# Patient Record
Sex: MALE | Race: WHITE | Employment: OTHER | URBAN - METROPOLITAN AREA
[De-identification: names, ages, dates, MRNs, and addresses within clinical notes are randomized per-mention and may not be internally consistent; named-entity substitution may affect disease eponyms.]

---

## 2017-03-02 ENCOUNTER — COMMUNICATION - HEALTHEAST (OUTPATIENT)
Dept: FAMILY MEDICINE | Facility: CLINIC | Age: 47
End: 2017-03-02

## 2017-03-02 ENCOUNTER — AMBULATORY - HEALTHEAST (OUTPATIENT)
Dept: FAMILY MEDICINE | Facility: CLINIC | Age: 47
End: 2017-03-02

## 2017-03-23 ENCOUNTER — OFFICE VISIT - HEALTHEAST (OUTPATIENT)
Dept: FAMILY MEDICINE | Facility: CLINIC | Age: 47
End: 2017-03-23

## 2017-03-23 DIAGNOSIS — J02.0 STREP THROAT: ICD-10-CM

## 2017-03-23 DIAGNOSIS — R50.9 FEVER: ICD-10-CM

## 2017-03-23 ASSESSMENT — MIFFLIN-ST. JEOR: SCORE: 1882.97

## 2017-06-20 ENCOUNTER — RECORDS - HEALTHEAST (OUTPATIENT)
Dept: ADMINISTRATIVE | Facility: OTHER | Age: 47
End: 2017-06-20

## 2017-09-12 ENCOUNTER — OFFICE VISIT - HEALTHEAST (OUTPATIENT)
Dept: FAMILY MEDICINE | Facility: CLINIC | Age: 47
End: 2017-09-12

## 2017-09-12 DIAGNOSIS — Z00.00 ROUTINE GENERAL MEDICAL EXAMINATION AT A HEALTH CARE FACILITY: ICD-10-CM

## 2017-09-12 DIAGNOSIS — F31.9 BIPOLAR AFFECTIVE DISORDER (H): ICD-10-CM

## 2017-09-12 DIAGNOSIS — Z79.899 HIGH RISK MEDICATION USE: ICD-10-CM

## 2017-09-12 LAB
CHOLEST SERPL-MCNC: 213 MG/DL
FASTING STATUS PATIENT QL REPORTED: YES
HBA1C MFR BLD: 5.4 % (ref 3.5–6)
HDLC SERPL-MCNC: 37 MG/DL
LDLC SERPL CALC-MCNC: 147 MG/DL
TRIGL SERPL-MCNC: 147 MG/DL

## 2017-09-12 ASSESSMENT — MIFFLIN-ST. JEOR: SCORE: 1908.72

## 2017-09-24 ENCOUNTER — COMMUNICATION - HEALTHEAST (OUTPATIENT)
Dept: FAMILY MEDICINE | Facility: CLINIC | Age: 47
End: 2017-09-24

## 2017-12-28 ENCOUNTER — RECORDS - HEALTHEAST (OUTPATIENT)
Dept: ADMINISTRATIVE | Facility: OTHER | Age: 47
End: 2017-12-28

## 2017-12-29 ENCOUNTER — OFFICE VISIT - HEALTHEAST (OUTPATIENT)
Dept: FAMILY MEDICINE | Facility: CLINIC | Age: 47
End: 2017-12-29

## 2017-12-29 DIAGNOSIS — F51.01 PRIMARY INSOMNIA: ICD-10-CM

## 2017-12-29 ASSESSMENT — MIFFLIN-ST. JEOR: SCORE: 1880.24

## 2018-01-02 ENCOUNTER — COMMUNICATION - HEALTHEAST (OUTPATIENT)
Dept: FAMILY MEDICINE | Facility: CLINIC | Age: 48
End: 2018-01-02

## 2018-01-29 ENCOUNTER — COMMUNICATION - HEALTHEAST (OUTPATIENT)
Dept: FAMILY MEDICINE | Facility: CLINIC | Age: 48
End: 2018-01-29

## 2018-01-29 DIAGNOSIS — K21.9 GERD (GASTROESOPHAGEAL REFLUX DISEASE): ICD-10-CM

## 2018-08-02 ENCOUNTER — OFFICE VISIT - HEALTHEAST (OUTPATIENT)
Dept: FAMILY MEDICINE | Facility: CLINIC | Age: 48
End: 2018-08-02

## 2018-08-02 DIAGNOSIS — K14.8 TONGUE LESION: ICD-10-CM

## 2018-08-02 ASSESSMENT — MIFFLIN-ST. JEOR: SCORE: 1799.73

## 2018-10-04 ENCOUNTER — AMBULATORY - HEALTHEAST (OUTPATIENT)
Dept: LAB | Facility: CLINIC | Age: 48
End: 2018-10-04

## 2018-10-04 ENCOUNTER — OFFICE VISIT - HEALTHEAST (OUTPATIENT)
Dept: FAMILY MEDICINE | Facility: CLINIC | Age: 48
End: 2018-10-04

## 2018-10-04 DIAGNOSIS — Z79.899 HIGH RISK MEDICATION USE: ICD-10-CM

## 2018-10-04 DIAGNOSIS — F31.9 BIPOLAR I DISORDER (H): ICD-10-CM

## 2018-10-04 DIAGNOSIS — F31.9 BIPOLAR AFFECTIVE DISORDER (H): ICD-10-CM

## 2018-10-04 LAB
ANION GAP SERPL CALCULATED.3IONS-SCNC: 12 MMOL/L (ref 5–18)
BUN SERPL-MCNC: 20 MG/DL (ref 8–22)
CALCIUM SERPL-MCNC: 9.3 MG/DL (ref 8.5–10.5)
CHLORIDE BLD-SCNC: 102 MMOL/L (ref 98–107)
CHOLEST SERPL-MCNC: 176 MG/DL
CO2 SERPL-SCNC: 23 MMOL/L (ref 22–31)
CREAT SERPL-MCNC: 1 MG/DL (ref 0.7–1.3)
FASTING STATUS PATIENT QL REPORTED: YES
GFR SERPL CREATININE-BSD FRML MDRD: >60 ML/MIN/1.73M2
GLUCOSE BLD-MCNC: 100 MG/DL (ref 70–125)
HBA1C MFR BLD: 5.7 % (ref 3.5–6)
HDLC SERPL-MCNC: 44 MG/DL
LDLC SERPL CALC-MCNC: 113 MG/DL
LITHIUM SERPL-SCNC: <0.2 MMOL/L (ref 0.6–1.2)
POTASSIUM BLD-SCNC: 4 MMOL/L (ref 3.5–5)
PROLACTIN SERPL-MCNC: 16.3 NG/ML (ref 0–15)
SODIUM SERPL-SCNC: 137 MMOL/L (ref 136–145)
T4 TOTAL - HISTORICAL: 6.7 UG/DL (ref 4.5–13)
TRIGL SERPL-MCNC: 95 MG/DL
TSH SERPL DL<=0.005 MIU/L-ACNC: 1.34 UIU/ML (ref 0.3–5)

## 2018-10-04 ASSESSMENT — MIFFLIN-ST. JEOR: SCORE: 1823.54

## 2018-10-10 ENCOUNTER — COMMUNICATION - HEALTHEAST (OUTPATIENT)
Dept: FAMILY MEDICINE | Facility: CLINIC | Age: 48
End: 2018-10-10

## 2018-10-16 ENCOUNTER — COMMUNICATION - HEALTHEAST (OUTPATIENT)
Dept: FAMILY MEDICINE | Facility: CLINIC | Age: 48
End: 2018-10-16

## 2018-10-16 DIAGNOSIS — K21.9 GERD (GASTROESOPHAGEAL REFLUX DISEASE): ICD-10-CM

## 2018-12-12 ENCOUNTER — COMMUNICATION - HEALTHEAST (OUTPATIENT)
Dept: SCHEDULING | Facility: CLINIC | Age: 48
End: 2018-12-12

## 2018-12-12 ENCOUNTER — OFFICE VISIT - HEALTHEAST (OUTPATIENT)
Dept: FAMILY MEDICINE | Facility: CLINIC | Age: 48
End: 2018-12-12

## 2018-12-12 DIAGNOSIS — Z00.00 ENCOUNTER FOR ROUTINE ADULT HEALTH EXAMINATION WITHOUT ABNORMAL FINDINGS: ICD-10-CM

## 2018-12-12 DIAGNOSIS — F99 INSOMNIA DUE TO OTHER MENTAL DISORDER: ICD-10-CM

## 2018-12-12 DIAGNOSIS — F51.05 INSOMNIA DUE TO OTHER MENTAL DISORDER: ICD-10-CM

## 2018-12-12 ASSESSMENT — MIFFLIN-ST. JEOR: SCORE: 1837.15

## 2018-12-13 ENCOUNTER — COMMUNICATION - HEALTHEAST (OUTPATIENT)
Dept: FAMILY MEDICINE | Facility: CLINIC | Age: 48
End: 2018-12-13

## 2018-12-13 ENCOUNTER — OFFICE VISIT - HEALTHEAST (OUTPATIENT)
Dept: FAMILY MEDICINE | Facility: CLINIC | Age: 48
End: 2018-12-13

## 2018-12-13 DIAGNOSIS — G47.00 INSOMNIA, UNSPECIFIED TYPE: ICD-10-CM

## 2018-12-13 DIAGNOSIS — F31.9 BIPOLAR AFFECTIVE DISORDER, REMISSION STATUS UNSPECIFIED (H): ICD-10-CM

## 2018-12-13 ASSESSMENT — MIFFLIN-ST. JEOR: SCORE: 1845.94

## 2019-01-01 ENCOUNTER — COMMUNICATION - HEALTHEAST (OUTPATIENT)
Dept: SCHEDULING | Facility: CLINIC | Age: 49
End: 2019-01-01

## 2019-01-04 ENCOUNTER — OFFICE VISIT - HEALTHEAST (OUTPATIENT)
Dept: FAMILY MEDICINE | Facility: CLINIC | Age: 49
End: 2019-01-04

## 2019-01-04 ENCOUNTER — COMMUNICATION - HEALTHEAST (OUTPATIENT)
Dept: FAMILY MEDICINE | Facility: CLINIC | Age: 49
End: 2019-01-04

## 2019-01-04 DIAGNOSIS — F51.01 PRIMARY INSOMNIA: ICD-10-CM

## 2019-01-04 DIAGNOSIS — F31.9 BIPOLAR AFFECTIVE DISORDER, REMISSION STATUS UNSPECIFIED (H): ICD-10-CM

## 2019-01-04 ASSESSMENT — MIFFLIN-ST. JEOR: SCORE: 1849.63

## 2019-03-14 ENCOUNTER — RECORDS - HEALTHEAST (OUTPATIENT)
Dept: ADMINISTRATIVE | Facility: OTHER | Age: 49
End: 2019-03-14

## 2019-04-09 ENCOUNTER — OFFICE VISIT - HEALTHEAST (OUTPATIENT)
Dept: FAMILY MEDICINE | Facility: CLINIC | Age: 49
End: 2019-04-09

## 2019-04-09 DIAGNOSIS — N64.4 NIPPLE TENDERNESS: ICD-10-CM

## 2019-04-09 DIAGNOSIS — Z76.0 ENCOUNTER FOR MEDICATION REFILL: ICD-10-CM

## 2019-04-09 DIAGNOSIS — K59.01 SLOW TRANSIT CONSTIPATION: ICD-10-CM

## 2019-04-09 DIAGNOSIS — Z00.00 ROUTINE HEALTH MAINTENANCE: ICD-10-CM

## 2019-04-09 ASSESSMENT — MIFFLIN-ST. JEOR: SCORE: 1890.45

## 2019-06-04 ENCOUNTER — OFFICE VISIT - HEALTHEAST (OUTPATIENT)
Dept: FAMILY MEDICINE | Facility: CLINIC | Age: 49
End: 2019-06-04

## 2019-06-04 DIAGNOSIS — F51.5 NIGHTMARES: ICD-10-CM

## 2019-06-04 DIAGNOSIS — R05.9 COUGH: ICD-10-CM

## 2019-06-04 DIAGNOSIS — H61.23 BILATERAL IMPACTED CERUMEN: ICD-10-CM

## 2019-06-04 LAB — DEPRECATED S PYO AG THROAT QL EIA: NORMAL

## 2019-06-04 ASSESSMENT — MIFFLIN-ST. JEOR: SCORE: 1851.89

## 2019-06-05 LAB — GROUP A STREP BY PCR: NORMAL

## 2019-08-07 ENCOUNTER — COMMUNICATION - HEALTHEAST (OUTPATIENT)
Dept: FAMILY MEDICINE | Facility: CLINIC | Age: 49
End: 2019-08-07

## 2019-09-05 ENCOUNTER — COMMUNICATION - HEALTHEAST (OUTPATIENT)
Dept: FAMILY MEDICINE | Facility: CLINIC | Age: 49
End: 2019-09-05

## 2019-09-05 DIAGNOSIS — K21.9 GERD (GASTROESOPHAGEAL REFLUX DISEASE): ICD-10-CM

## 2019-11-06 ENCOUNTER — OFFICE VISIT - HEALTHEAST (OUTPATIENT)
Dept: FAMILY MEDICINE | Facility: CLINIC | Age: 49
End: 2019-11-06

## 2019-11-06 DIAGNOSIS — Z12.5 SCREENING FOR PROSTATE CANCER: ICD-10-CM

## 2019-11-06 DIAGNOSIS — Z12.11 COLON CANCER SCREENING: ICD-10-CM

## 2019-11-06 DIAGNOSIS — R33.9 INCOMPLETE BLADDER EMPTYING: ICD-10-CM

## 2019-11-06 LAB
AMORPH CRY #/AREA URNS HPF: ABNORMAL /[HPF]
BACTERIA #/AREA URNS HPF: ABNORMAL HPF
MUCOUS THREADS #/AREA URNS LPF: ABNORMAL LPF
PSA SERPL-MCNC: 1.1 NG/ML (ref 0–3.5)
RBC #/AREA URNS AUTO: ABNORMAL HPF
SQUAMOUS #/AREA URNS AUTO: ABNORMAL LPF
WBC #/AREA URNS AUTO: ABNORMAL HPF

## 2019-11-06 ASSESSMENT — MIFFLIN-ST. JEOR: SCORE: 1836.02

## 2019-11-08 ENCOUNTER — COMMUNICATION - HEALTHEAST (OUTPATIENT)
Dept: SCHEDULING | Facility: CLINIC | Age: 49
End: 2019-11-08

## 2019-11-14 ENCOUNTER — HOSPITAL ENCOUNTER (OUTPATIENT)
Dept: LAB | Age: 49
Setting detail: SPECIMEN
Discharge: HOME | End: 2019-11-14

## 2019-11-14 ENCOUNTER — OFFICE VISIT - HEALTHEAST (OUTPATIENT)
Dept: FAMILY MEDICINE | Facility: CLINIC | Age: 49
End: 2019-11-14

## 2019-11-14 DIAGNOSIS — Z00.00 ANNUAL PHYSICAL EXAM: ICD-10-CM

## 2019-11-14 LAB
CHOLEST SERPL-MCNC: 191 MG/DL
FASTING STATUS PATIENT QL REPORTED: ABNORMAL
HDLC SERPL-MCNC: 45 MG/DL
LDLC SERPL CALC-MCNC: 107 MG/DL
TRIGL SERPL-MCNC: 193 MG/DL

## 2019-11-25 ENCOUNTER — COMMUNICATION - HEALTHEAST (OUTPATIENT)
Dept: FAMILY MEDICINE | Facility: CLINIC | Age: 49
End: 2019-11-25

## 2019-11-25 ENCOUNTER — OFFICE VISIT - HEALTHEAST (OUTPATIENT)
Dept: FAMILY MEDICINE | Facility: CLINIC | Age: 49
End: 2019-11-25

## 2019-11-25 DIAGNOSIS — R31.29 MICROSCOPIC HEMATURIA: ICD-10-CM

## 2019-11-25 LAB
ALBUMIN UR-MCNC: NEGATIVE MG/DL
APPEARANCE UR: CLEAR
BACTERIA #/AREA URNS HPF: ABNORMAL HPF
BILIRUB UR QL STRIP: NEGATIVE
COLOR UR AUTO: YELLOW
GLUCOSE UR STRIP-MCNC: NEGATIVE MG/DL
HGB UR QL STRIP: NEGATIVE
KETONES UR STRIP-MCNC: NEGATIVE MG/DL
LEUKOCYTE ESTERASE UR QL STRIP: ABNORMAL
NITRATE UR QL: NEGATIVE
PH UR STRIP: 6 [PH] (ref 5–8)
RBC #/AREA URNS AUTO: ABNORMAL HPF
SP GR UR STRIP: 1.01 (ref 1–1.03)
SQUAMOUS #/AREA URNS AUTO: ABNORMAL LPF
UROBILINOGEN UR STRIP-ACNC: ABNORMAL
WBC #/AREA URNS AUTO: ABNORMAL HPF

## 2019-11-25 ASSESSMENT — MIFFLIN-ST. JEOR: SCORE: 1818.23

## 2019-11-26 LAB — BACTERIA SPEC CULT: NO GROWTH

## 2019-12-03 ENCOUNTER — OFFICE VISIT - HEALTHEAST (OUTPATIENT)
Dept: FAMILY MEDICINE | Facility: CLINIC | Age: 49
End: 2019-12-03

## 2019-12-03 DIAGNOSIS — R31.29 MICROSCOPIC HEMATURIA: ICD-10-CM

## 2019-12-03 ASSESSMENT — MIFFLIN-ST. JEOR: SCORE: 1835.8

## 2019-12-09 ENCOUNTER — COMMUNICATION - HEALTHEAST (OUTPATIENT)
Dept: NURSING | Facility: CLINIC | Age: 49
End: 2019-12-09

## 2019-12-09 ENCOUNTER — COMMUNICATION - HEALTHEAST (OUTPATIENT)
Dept: SCHEDULING | Facility: CLINIC | Age: 49
End: 2019-12-09

## 2019-12-09 ENCOUNTER — COMMUNICATION - HEALTHEAST (OUTPATIENT)
Dept: FAMILY MEDICINE | Facility: CLINIC | Age: 49
End: 2019-12-09

## 2019-12-09 ENCOUNTER — AMBULATORY - HEALTHEAST (OUTPATIENT)
Dept: CARE COORDINATION | Facility: CLINIC | Age: 49
End: 2019-12-09

## 2019-12-09 DIAGNOSIS — F10.10 ALCOHOL ABUSE: ICD-10-CM

## 2019-12-09 DIAGNOSIS — F31.9 BIPOLAR AFFECTIVE DISORDER (H): ICD-10-CM

## 2019-12-09 DIAGNOSIS — F41.9 ANXIETY: ICD-10-CM

## 2019-12-10 ENCOUNTER — OFFICE VISIT - HEALTHEAST (OUTPATIENT)
Dept: FAMILY MEDICINE | Facility: CLINIC | Age: 49
End: 2019-12-10

## 2019-12-10 DIAGNOSIS — F25.0 SCHIZOAFFECTIVE DISORDER, BIPOLAR TYPE (H): ICD-10-CM

## 2019-12-10 ASSESSMENT — MIFFLIN-ST. JEOR: SCORE: 1799.73

## 2019-12-17 ENCOUNTER — OFFICE VISIT - HEALTHEAST (OUTPATIENT)
Dept: FAMILY MEDICINE | Facility: CLINIC | Age: 49
End: 2019-12-17

## 2019-12-17 DIAGNOSIS — F25.0 SCHIZOAFFECTIVE DISORDER, BIPOLAR TYPE (H): ICD-10-CM

## 2019-12-17 ASSESSMENT — MIFFLIN-ST. JEOR: SCORE: 1822.41

## 2019-12-23 ENCOUNTER — COMMUNICATION - HEALTHEAST (OUTPATIENT)
Dept: SCHEDULING | Facility: CLINIC | Age: 49
End: 2019-12-23

## 2019-12-24 ENCOUNTER — AMBULATORY - HEALTHEAST (OUTPATIENT)
Dept: CARE COORDINATION | Facility: CLINIC | Age: 49
End: 2019-12-24

## 2019-12-24 DIAGNOSIS — F41.9 ANXIETY: ICD-10-CM

## 2019-12-24 DIAGNOSIS — F25.0 SCHIZOAFFECTIVE DISORDER, BIPOLAR TYPE (H): ICD-10-CM

## 2019-12-26 ENCOUNTER — OFFICE VISIT - HEALTHEAST (OUTPATIENT)
Dept: FAMILY MEDICINE | Facility: CLINIC | Age: 49
End: 2019-12-26

## 2019-12-26 DIAGNOSIS — F25.0 SCHIZOAFFECTIVE DISORDER, BIPOLAR TYPE (H): ICD-10-CM

## 2019-12-26 DIAGNOSIS — R06.02 SHORTNESS OF BREATH: ICD-10-CM

## 2019-12-26 ASSESSMENT — MIFFLIN-ST. JEOR: SCORE: 1795.19

## 2019-12-27 ENCOUNTER — COMMUNICATION - HEALTHEAST (OUTPATIENT)
Dept: SCHEDULING | Facility: CLINIC | Age: 49
End: 2019-12-27

## 2020-01-06 ENCOUNTER — COMMUNICATION - HEALTHEAST (OUTPATIENT)
Dept: FAMILY MEDICINE | Facility: CLINIC | Age: 50
End: 2020-01-06

## 2020-01-09 ENCOUNTER — OFFICE VISIT - HEALTHEAST (OUTPATIENT)
Dept: FAMILY MEDICINE | Facility: CLINIC | Age: 50
End: 2020-01-09

## 2020-01-09 DIAGNOSIS — F25.0 SCHIZOAFFECTIVE DISORDER, BIPOLAR TYPE (H): ICD-10-CM

## 2020-01-09 DIAGNOSIS — F12.11 MARIJUANA ABUSE IN REMISSION: ICD-10-CM

## 2020-01-09 DIAGNOSIS — R20.2 PARESTHESIAS: ICD-10-CM

## 2020-01-09 ASSESSMENT — MIFFLIN-ST. JEOR: SCORE: 1853.03

## 2020-01-22 ENCOUNTER — OFFICE VISIT - HEALTHEAST (OUTPATIENT)
Dept: FAMILY MEDICINE | Facility: CLINIC | Age: 50
End: 2020-01-22

## 2020-01-22 DIAGNOSIS — F25.0 SCHIZOAFFECTIVE DISORDER, BIPOLAR TYPE (H): ICD-10-CM

## 2020-01-22 DIAGNOSIS — Z79.899 HIGH RISK MEDICATION USE: ICD-10-CM

## 2020-01-22 DIAGNOSIS — F12.11 MARIJUANA ABUSE IN REMISSION: ICD-10-CM

## 2020-01-22 DIAGNOSIS — F31.12 BIPOLAR I DISORDER, MOST RECENT EPISODE (OR CURRENT) MANIC, MODERATE (H): ICD-10-CM

## 2020-01-22 LAB
CHOLEST SERPL-MCNC: 217 MG/DL
FASTING STATUS PATIENT QL REPORTED: NO
FASTING STATUS PATIENT QL REPORTED: NO
GLUCOSE BLD-MCNC: 87 MG/DL (ref 74–125)
HBA1C MFR BLD: 5.5 % (ref 3.5–6)
HDLC SERPL-MCNC: 43 MG/DL
LDLC SERPL CALC-MCNC: ABNORMAL MG/DL
PROLACTIN SERPL-MCNC: 66.4 NG/ML (ref 0–15)
TRIGL SERPL-MCNC: 405 MG/DL

## 2020-01-22 ASSESSMENT — MIFFLIN-ST. JEOR: SCORE: 1840.55

## 2020-01-23 ENCOUNTER — COMMUNICATION - HEALTHEAST (OUTPATIENT)
Dept: FAMILY MEDICINE | Facility: CLINIC | Age: 50
End: 2020-01-23

## 2020-02-10 ENCOUNTER — COMMUNICATION - HEALTHEAST (OUTPATIENT)
Dept: FAMILY MEDICINE | Facility: CLINIC | Age: 50
End: 2020-02-10

## 2020-02-13 ENCOUNTER — OFFICE VISIT - HEALTHEAST (OUTPATIENT)
Dept: FAMILY MEDICINE | Facility: CLINIC | Age: 50
End: 2020-02-13

## 2020-02-13 DIAGNOSIS — F25.0 SCHIZOAFFECTIVE DISORDER, BIPOLAR TYPE (H): ICD-10-CM

## 2020-02-13 ASSESSMENT — MIFFLIN-ST. JEOR: SCORE: 1872.31

## 2020-03-18 ENCOUNTER — OFFICE VISIT - HEALTHEAST (OUTPATIENT)
Dept: FAMILY MEDICINE | Facility: CLINIC | Age: 50
End: 2020-03-18

## 2020-03-18 DIAGNOSIS — F25.0 SCHIZOAFFECTIVE DISORDER, BIPOLAR TYPE (H): ICD-10-CM

## 2020-03-18 DIAGNOSIS — Z23 NEED FOR IMMUNIZATION AGAINST INFLUENZA: ICD-10-CM

## 2020-03-18 DIAGNOSIS — F31.70 BIPOLAR DISORDER IN PARTIAL REMISSION, MOST RECENT EPISODE UNSPECIFIED TYPE (H): ICD-10-CM

## 2020-03-18 DIAGNOSIS — F32.A DEPRESSION: ICD-10-CM

## 2020-03-18 ASSESSMENT — PATIENT HEALTH QUESTIONNAIRE - PHQ9: SUM OF ALL RESPONSES TO PHQ QUESTIONS 1-9: 0

## 2020-03-18 ASSESSMENT — MIFFLIN-ST. JEOR: SCORE: 1894.99

## 2020-03-23 ENCOUNTER — COMMUNICATION - HEALTHEAST (OUTPATIENT)
Dept: FAMILY MEDICINE | Facility: CLINIC | Age: 50
End: 2020-03-23

## 2020-03-25 ENCOUNTER — COMMUNICATION - HEALTHEAST (OUTPATIENT)
Dept: FAMILY MEDICINE | Facility: CLINIC | Age: 50
End: 2020-03-25

## 2020-03-25 DIAGNOSIS — K21.9 GERD (GASTROESOPHAGEAL REFLUX DISEASE): ICD-10-CM

## 2020-04-06 ENCOUNTER — COMMUNICATION - HEALTHEAST (OUTPATIENT)
Dept: FAMILY MEDICINE | Facility: CLINIC | Age: 50
End: 2020-04-06

## 2020-04-21 ENCOUNTER — OFFICE VISIT - HEALTHEAST (OUTPATIENT)
Dept: FAMILY MEDICINE | Facility: CLINIC | Age: 50
End: 2020-04-21

## 2020-04-21 DIAGNOSIS — G47.00 INSOMNIA, UNSPECIFIED TYPE: ICD-10-CM

## 2020-04-21 DIAGNOSIS — F25.0 SCHIZOAFFECTIVE DISORDER, BIPOLAR TYPE (H): ICD-10-CM

## 2020-04-21 ASSESSMENT — MIFFLIN-ST. JEOR: SCORE: 1867.77

## 2020-05-26 ENCOUNTER — OFFICE VISIT - HEALTHEAST (OUTPATIENT)
Dept: FAMILY MEDICINE | Facility: CLINIC | Age: 50
End: 2020-05-26

## 2020-05-26 DIAGNOSIS — G47.00 INSOMNIA, UNSPECIFIED TYPE: ICD-10-CM

## 2020-06-25 ENCOUNTER — AMBULATORY - HEALTHEAST (OUTPATIENT)
Dept: NURSING | Facility: CLINIC | Age: 50
End: 2020-06-25

## 2020-07-08 ENCOUNTER — RECORDS - HEALTHEAST (OUTPATIENT)
Dept: ADMINISTRATIVE | Facility: OTHER | Age: 50
End: 2020-07-08

## 2020-07-23 ENCOUNTER — COMMUNICATION - HEALTHEAST (OUTPATIENT)
Dept: SCHEDULING | Facility: CLINIC | Age: 50
End: 2020-07-23

## 2020-07-29 ENCOUNTER — AMBULATORY - HEALTHEAST (OUTPATIENT)
Dept: NURSING | Facility: CLINIC | Age: 50
End: 2020-07-29

## 2020-08-25 ENCOUNTER — COMMUNICATION - HEALTHEAST (OUTPATIENT)
Dept: FAMILY MEDICINE | Facility: CLINIC | Age: 50
End: 2020-08-25

## 2020-08-27 ENCOUNTER — COMMUNICATION - HEALTHEAST (OUTPATIENT)
Dept: FAMILY MEDICINE | Facility: CLINIC | Age: 50
End: 2020-08-27

## 2020-08-28 ENCOUNTER — AMBULATORY - HEALTHEAST (OUTPATIENT)
Dept: NURSING | Facility: CLINIC | Age: 50
End: 2020-08-28

## 2020-09-16 ENCOUNTER — RECORDS - HEALTHEAST (OUTPATIENT)
Dept: ADMINISTRATIVE | Facility: OTHER | Age: 50
End: 2020-09-16

## 2020-09-30 ENCOUNTER — AMBULATORY - HEALTHEAST (OUTPATIENT)
Dept: NURSING | Facility: CLINIC | Age: 50
End: 2020-09-30

## 2020-09-30 ENCOUNTER — AMBULATORY - HEALTHEAST (OUTPATIENT)
Dept: LAB | Facility: CLINIC | Age: 50
End: 2020-09-30

## 2020-09-30 DIAGNOSIS — Z79.899 ENCOUNTER FOR LONG-TERM (CURRENT) USE OF HIGH-RISK MEDICATION: ICD-10-CM

## 2020-09-30 DIAGNOSIS — Z23 NEED FOR IMMUNIZATION AGAINST INFLUENZA: ICD-10-CM

## 2020-09-30 LAB
CHOLEST SERPL-MCNC: 199 MG/DL
FASTING STATUS PATIENT QL REPORTED: NO
FASTING STATUS PATIENT QL REPORTED: NO
GLUCOSE BLD-MCNC: 77 MG/DL (ref 74–125)
HBA1C MFR BLD: 5.6 %
HDLC SERPL-MCNC: 43 MG/DL
LDLC SERPL CALC-MCNC: 93 MG/DL
PROLACTIN SERPL-MCNC: 45.7 NG/ML (ref 0–15)
TRIGL SERPL-MCNC: 316 MG/DL

## 2020-10-01 ENCOUNTER — COMMUNICATION - HEALTHEAST (OUTPATIENT)
Dept: FAMILY MEDICINE | Facility: CLINIC | Age: 50
End: 2020-10-01

## 2020-10-30 ENCOUNTER — AMBULATORY - HEALTHEAST (OUTPATIENT)
Dept: NURSING | Facility: CLINIC | Age: 50
End: 2020-10-30

## 2020-12-02 ENCOUNTER — AMBULATORY - HEALTHEAST (OUTPATIENT)
Dept: NURSING | Facility: CLINIC | Age: 50
End: 2020-12-02

## 2020-12-14 ENCOUNTER — OFFICE VISIT - HEALTHEAST (OUTPATIENT)
Dept: FAMILY MEDICINE | Facility: CLINIC | Age: 50
End: 2020-12-14

## 2020-12-14 DIAGNOSIS — F25.0 SCHIZOAFFECTIVE DISORDER, BIPOLAR TYPE (H): ICD-10-CM

## 2020-12-14 ASSESSMENT — MIFFLIN-ST. JEOR: SCORE: 1926.74

## 2020-12-15 ENCOUNTER — COMMUNICATION - HEALTHEAST (OUTPATIENT)
Dept: NURSING | Facility: CLINIC | Age: 50
End: 2020-12-15

## 2020-12-30 ENCOUNTER — COMMUNICATION - HEALTHEAST (OUTPATIENT)
Dept: NURSING | Facility: CLINIC | Age: 50
End: 2020-12-30

## 2021-01-04 ENCOUNTER — AMBULATORY - HEALTHEAST (OUTPATIENT)
Dept: NURSING | Facility: CLINIC | Age: 51
End: 2021-01-04

## 2021-01-04 ENCOUNTER — AMBULATORY - HEALTHEAST (OUTPATIENT)
Dept: FAMILY MEDICINE | Facility: CLINIC | Age: 51
End: 2021-01-04

## 2021-01-04 DIAGNOSIS — F25.0 SCHIZOAFFECTIVE DISORDER, BIPOLAR TYPE (H): ICD-10-CM

## 2021-01-06 ENCOUNTER — OFFICE VISIT - HEALTHEAST (OUTPATIENT)
Dept: FAMILY MEDICINE | Facility: CLINIC | Age: 51
End: 2021-01-06

## 2021-01-06 DIAGNOSIS — R07.9 CHEST PAIN, UNSPECIFIED TYPE: ICD-10-CM

## 2021-01-06 DIAGNOSIS — F25.0 SCHIZOAFFECTIVE DISORDER, BIPOLAR TYPE (H): ICD-10-CM

## 2021-01-06 ASSESSMENT — PATIENT HEALTH QUESTIONNAIRE - PHQ9: SUM OF ALL RESPONSES TO PHQ QUESTIONS 1-9: 4

## 2021-01-06 ASSESSMENT — MIFFLIN-ST. JEOR: SCORE: 1953.95

## 2021-01-07 LAB
ATRIAL RATE - MUSE: 110 BPM
DIASTOLIC BLOOD PRESSURE - MUSE: NORMAL
INTERPRETATION ECG - MUSE: NORMAL
P AXIS - MUSE: 60 DEGREES
PR INTERVAL - MUSE: 140 MS
QRS DURATION - MUSE: 92 MS
QT - MUSE: 340 MS
QTC - MUSE: 460 MS
R AXIS - MUSE: 12 DEGREES
SYSTOLIC BLOOD PRESSURE - MUSE: NORMAL
T AXIS - MUSE: 64 DEGREES
VENTRICULAR RATE- MUSE: 110 BPM

## 2021-02-09 ENCOUNTER — AMBULATORY - HEALTHEAST (OUTPATIENT)
Dept: NURSING | Facility: CLINIC | Age: 51
End: 2021-02-09

## 2021-02-11 ENCOUNTER — OFFICE VISIT - HEALTHEAST (OUTPATIENT)
Dept: FAMILY MEDICINE | Facility: CLINIC | Age: 51
End: 2021-02-11

## 2021-02-11 DIAGNOSIS — F25.0 SCHIZOAFFECTIVE DISORDER, BIPOLAR TYPE (H): ICD-10-CM

## 2021-02-11 DIAGNOSIS — R00.0 SINUS TACHYCARDIA: ICD-10-CM

## 2021-02-11 DIAGNOSIS — H93.13 TINNITUS, BILATERAL: ICD-10-CM

## 2021-02-11 RX ORDER — METOPROLOL SUCCINATE 50 MG/1
50 TABLET, EXTENDED RELEASE ORAL DAILY
Qty: 30 TABLET | Refills: 11 | Status: SHIPPED | OUTPATIENT
Start: 2021-02-11 | End: 2021-08-03

## 2021-02-11 ASSESSMENT — MIFFLIN-ST. JEOR: SCORE: 1993.64

## 2021-03-10 ENCOUNTER — RECORDS - HEALTHEAST (OUTPATIENT)
Dept: ADMINISTRATIVE | Facility: OTHER | Age: 51
End: 2021-03-10

## 2021-03-16 ENCOUNTER — OFFICE VISIT - HEALTHEAST (OUTPATIENT)
Dept: FAMILY MEDICINE | Facility: CLINIC | Age: 51
End: 2021-03-16

## 2021-03-16 DIAGNOSIS — R00.0 SINUS TACHYCARDIA: ICD-10-CM

## 2021-03-16 DIAGNOSIS — F25.0 SCHIZOAFFECTIVE DISORDER, BIPOLAR TYPE (H): ICD-10-CM

## 2021-03-16 DIAGNOSIS — Z79.899 HIGH RISK MEDICATIONS (NOT ANTICOAGULANTS) LONG-TERM USE: ICD-10-CM

## 2021-03-16 ASSESSMENT — MIFFLIN-ST. JEOR: SCORE: 2017.46

## 2021-03-30 ENCOUNTER — MEDICAL CORRESPONDENCE (OUTPATIENT)
Dept: HEALTH INFORMATION MANAGEMENT | Facility: CLINIC | Age: 51
End: 2021-03-30

## 2021-04-03 ENCOUNTER — COMMUNICATION - HEALTHEAST (OUTPATIENT)
Dept: FAMILY MEDICINE | Facility: CLINIC | Age: 51
End: 2021-04-03

## 2021-04-03 DIAGNOSIS — K21.9 GERD (GASTROESOPHAGEAL REFLUX DISEASE): ICD-10-CM

## 2021-04-05 RX ORDER — OMEPRAZOLE 40 MG/1
CAPSULE, DELAYED RELEASE ORAL
Qty: 90 CAPSULE | Refills: 3 | Status: SHIPPED | OUTPATIENT
Start: 2021-04-05 | End: 2022-03-10

## 2021-04-07 ENCOUNTER — COMMUNICATION - HEALTHEAST (OUTPATIENT)
Dept: FAMILY MEDICINE | Facility: CLINIC | Age: 51
End: 2021-04-07

## 2021-04-07 LAB
ALT SERPL W P-5'-P-CCNC: 42 U/L (ref 0–45)
AMYLASE SERPL-CCNC: 53 U/L (ref 5–120)
BASOPHILS # BLD AUTO: 0.1 THOU/UL (ref 0–0.2)
BASOPHILS NFR BLD AUTO: 1 % (ref 0–2)
CHOLEST SERPL-MCNC: 152 MG/DL
EOSINOPHIL # BLD AUTO: 0.3 THOU/UL (ref 0–0.4)
EOSINOPHIL NFR BLD AUTO: 4 % (ref 0–6)
ERYTHROCYTE [DISTWIDTH] IN BLOOD BY AUTOMATED COUNT: 13.2 % (ref 11–14.5)
FASTING STATUS PATIENT QL REPORTED: NO
FASTING STATUS PATIENT QL REPORTED: NO
GLUCOSE BLD-MCNC: 102 MG/DL (ref 74–125)
HBA1C MFR BLD: 5.5 %
HCT VFR BLD AUTO: 40.2 % (ref 40–54)
HDLC SERPL-MCNC: 39 MG/DL
HGB BLD-MCNC: 14.1 G/DL (ref 14–18)
IMM GRANULOCYTES # BLD: 0 THOU/UL
IMM GRANULOCYTES NFR BLD: 0 %
LDLC SERPL CALC-MCNC: 81 MG/DL
LYMPHOCYTES # BLD AUTO: 1.9 THOU/UL (ref 0.8–4.4)
LYMPHOCYTES NFR BLD AUTO: 24 % (ref 20–40)
MCH RBC QN AUTO: 33.1 PG (ref 27–34)
MCHC RBC AUTO-ENTMCNC: 35.1 G/DL (ref 32–36)
MCV RBC AUTO: 94 FL (ref 80–100)
MONOCYTES # BLD AUTO: 0.7 THOU/UL (ref 0–0.9)
MONOCYTES NFR BLD AUTO: 9 % (ref 2–10)
NEUTROPHILS # BLD AUTO: 4.8 THOU/UL (ref 2–7.7)
NEUTROPHILS NFR BLD AUTO: 62 % (ref 50–70)
PLATELET # BLD AUTO: 337 THOU/UL (ref 140–440)
PMV BLD AUTO: 8.9 FL (ref 7–10)
PROLACTIN SERPL-MCNC: 59.1 NG/ML (ref 0–15)
RBC # BLD AUTO: 4.26 MILL/UL (ref 4.4–6.2)
TRIGL SERPL-MCNC: 160 MG/DL
VALPROATE SERPL-MCNC: 26.5 UG/ML (ref 50–150)
WBC: 7.7 THOU/UL (ref 4–11)

## 2021-04-20 ENCOUNTER — OFFICE VISIT - HEALTHEAST (OUTPATIENT)
Dept: FAMILY MEDICINE | Facility: CLINIC | Age: 51
End: 2021-04-20

## 2021-04-20 DIAGNOSIS — F25.0 SCHIZOAFFECTIVE DISORDER, BIPOLAR TYPE (H): ICD-10-CM

## 2021-04-20 DIAGNOSIS — J30.2 SEASONAL ALLERGIC RHINITIS, UNSPECIFIED TRIGGER: ICD-10-CM

## 2021-04-20 DIAGNOSIS — R31.0 GROSS HEMATURIA: ICD-10-CM

## 2021-04-20 DIAGNOSIS — R00.0 SINUS TACHYCARDIA: ICD-10-CM

## 2021-04-20 LAB
ALBUMIN UR-MCNC: NEGATIVE G/DL
APPEARANCE UR: CLEAR
BILIRUB UR QL STRIP: NEGATIVE
COLOR UR AUTO: YELLOW
GLUCOSE UR STRIP-MCNC: NEGATIVE MG/DL
HGB UR QL STRIP: NEGATIVE
KETONES UR STRIP-MCNC: NEGATIVE MG/DL
LEUKOCYTE ESTERASE UR QL STRIP: NEGATIVE
NITRATE UR QL: NEGATIVE
PH UR STRIP: 5.5 [PH] (ref 5–8)
PSA SERPL-MCNC: 0.5 NG/ML (ref 0–3.5)
SP GR UR STRIP: 1.01 (ref 1–1.03)
UROBILINOGEN UR STRIP-ACNC: NORMAL

## 2021-04-20 RX ORDER — DIVALPROEX SODIUM 500 MG/1
500 TABLET, EXTENDED RELEASE ORAL AT BEDTIME
Status: SHIPPED | COMMUNITY
Start: 2021-04-07 | End: 2022-02-14

## 2021-04-20 ASSESSMENT — MIFFLIN-ST. JEOR: SCORE: 2008.38

## 2021-04-22 ENCOUNTER — COMMUNICATION - HEALTHEAST (OUTPATIENT)
Dept: FAMILY MEDICINE | Facility: CLINIC | Age: 51
End: 2021-04-22

## 2021-04-22 LAB
C TRACH DNA SPEC QL PROBE+SIG AMP: NEGATIVE
N GONORRHOEA DNA SPEC QL NAA+PROBE: NEGATIVE

## 2021-05-24 ENCOUNTER — OFFICE VISIT - HEALTHEAST (OUTPATIENT)
Dept: FAMILY MEDICINE | Facility: CLINIC | Age: 51
End: 2021-05-24

## 2021-05-24 DIAGNOSIS — F25.0 SCHIZOAFFECTIVE DISORDER, BIPOLAR TYPE (H): ICD-10-CM

## 2021-05-24 ASSESSMENT — MIFFLIN-ST. JEOR: SCORE: 2016.99

## 2021-05-26 ASSESSMENT — PATIENT HEALTH QUESTIONNAIRE - PHQ9: SUM OF ALL RESPONSES TO PHQ QUESTIONS 1-9: 0

## 2021-05-27 ASSESSMENT — PATIENT HEALTH QUESTIONNAIRE - PHQ9: SUM OF ALL RESPONSES TO PHQ QUESTIONS 1-9: 4

## 2021-05-27 NOTE — PROGRESS NOTES
"ASSESMENT AND PLAN:  1. Nipple tenderness  -  Intermittent nipple tenderness x about a year.  -  1/10 pain that is intermittent. Denies family hx of breast cancer, nipple discharge, mass, significant weight loss.  -  Exam unremarkable. Reassurance given to Pt.  He seems very anxious about everything and is afraid he might get cancer.       2. Slow transit constipation  -  Occasional constipation.  Has tried Miralax w/o much relief. Denies hematochezia.   Orders:   -senna-docusate (PERICOLACE) 8.6-50 mg tablet; Take 2 tablets by mouth daily as needed for constipation.  Dispense: 30 tablet; Refill: 1    3. 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   Went over Cologuard and Pt was willing to try but is NOT covered under [employer] as we;;.  Occult blood stool test is also NOT covered under [employer], per Milly.    -  Pt will be notified of Cologuard order cancelled.    Orders:   - CANCELLED: Cologuard     4. Encounter for medication refill  -  Controlled on current tx. Refill request.   Refill:   - mirtazapine (REMERON) 15 MG tablet; Take 15 mg by mouth at bedtime.   - lamoTRIgine (LAMICTAL) 150 MG tablet; Refill: 5     5.  Health Maintenance  -  Overdue for Annual exam.  -  F/u in 1 month.    Reviewed Medical/Social history and Medications.  See new changes above.   Discussed indications for emergent medical attention and routine F/u.  Patient/Parent/Guardian engaged in decision making process and verbalized understanding of the options discussed and agreed with the final treatment plan.     SUBJECTIVE:  Tripp Sanders is a 48 y.o. male who presents  for evaluation of his Nipples feel sore and tender (x 1month).    Pt denies family hx of breast cancer, nipple discharge, significant weight loss, night sweats, fevers/chills, hematochezia.  Pt reports, \"It's only slightly sore like a 1/10 and doesn't really bother me if I'm " "just sitting here.\"   Pt has done breast exams and denies any lumps/masses.  Exam is unremarkable; encouraged Pt to avoid constantly touching and irritating.     Order for Colonoscopy was placed few months ago, 12/12/18,  but Pt backed out bc he did not understand how to take the prep, \"It's too confusing so I gave up.\"      ROS:  Comprehensive Review of Systems Negative except stated in HPI.     No past medical history on file.  Patient Active Problem List   Diagnosis     Incomplete Emptying Of Bladder     Bipolar affective disorder (H)     Primary insomnia     Current Outpatient Medications   Medication Sig Dispense Refill     lamoTRIgine (LAMICTAL) 150 MG tablet   5     LATUDA 80 mg Tab   2     mirtazapine (REMERON) 15 MG tablet Take 15 mg by mouth at bedtime.       omeprazole (PRILOSEC) 40 MG capsule TAKE ONE CAPSULE BY MOUTH ONE TIME DAILY  90 capsule 3     QUEtiapine (SEROQUEL XR) 400 MG 24 hr tablet         5     divalproex (DEPAKOTE) 250 MG 24 hr tablet   0     divalproex (DEPAKOTE) 500 MG 24 hr tablet   2     hydrOXYzine HCl (ATARAX) 50 MG tablet Take 1 tablet (50 mg total) by mouth at bedtime. 30 tablet 0     lurasidone (LATUDA) 40 mg Tab tablet Take 40 mg by mouth daily.       QUEtiapine (SEROQUEL) 300 MG tablet Take 300 mg by mouth at bedtime .             ramelteon (ROZEREM) 8 mg tablet Take 1 tablet (8 mg total) by mouth at bedtime as needed for sleep. 30 tablet 0     senna-docusate (PERICOLACE) 8.6-50 mg tablet Take 2 tablets by mouth daily as needed for constipation. 30 tablet 1     No current facility-administered medications for this visit.      Social History     Tobacco Use   Smoking Status Never Smoker   Smokeless Tobacco Never Used     OBJECTIVE: BP 96/58   Pulse 100   Temp 97.6  F (36.4  C) (Oral)   Resp 24   Ht 6' (1.829 m)   Wt 221 lb (100.2 kg)   SpO2 98%   BMI 29.97 kg/m     No results found for this or any previous visit (from the past 24 hour(s)).    PHYSICAL:  General Alert, " awake, not in acute distress.   CV Normal S1 & S2. No murmurs.   RESP Non-labored, RRR, CTAB. No wheezes or crackles.    BREAST Symmetric.  No masses, lesions, skin dimpling, nipple discharge, rashes, erythema, edema.  Nipple tenderness, bilateral.        Rohan Mcgowan PA-C

## 2021-05-29 NOTE — PROGRESS NOTES
ASSESMENT AND PLAN:  1. Cough  -  Dry cough and Sore throat on left side x 1 week. Denies fevers/chills, wheezing/sob.   -  Rapid Strep: NEGATIVE   - Rapid Strep A Screen- Throat Swab   - Group A Strep, RNA Direct Detection, Throat   - dextromethorphan HBr 15 mg cap; Take 15 mg by mouth daily.  Dispense: 30 each; Refill: 0    2. Nightmares  -  Pt having nightmares for several weeks now. He thought it was he Amitriptyline that was causing his nightmares and d/c, but still having bad dreams.  - Follow up if symptoms not better or worsens.    - prazosin (MINIPRESS) 1 MG capsule; Take 1 capsule (1 mg total) by mouth at bedtime.  Dispense: 30 capsule; Refill: 0    3. Bilateral impacted cerumen  - Reports left ear tender. Both ear canals were impacted with cerumen.  I attempted to manually remove but Pt was too anxious and did not lay still so had CA use irrigation.  -  Rechecked ears after irrigation.  TMs intact and normal bilaterally.  Large chunks removed.     - Ear cerumen removal    4. Nipples tender  -  Pt reports his nipples still feels tender.  Denies discharge. Exam unremarkable. Advised Pt not to constantly check, which can irritate the skin and cause pain.    Reviewed Medical/Social history and Medications. See new changes above.   Discussed indications for emergent medical attention and routine F/u.  Patient/Parent/Guardian engaged in decision making process and verbalized understanding of the options discussed and agreed with the final treatment plan.     SUBJECTIVE:  Tripp Sanders is a 48 y.o. male who presents  for evaluation of his Sore Throat x 1 week.     Pt  States sore throat is mainly on left side of throat.  He also has mild dry cough and Left ear feels tender x 1 week.   On exam, both ears were impacted with cerumen. Pt was very anxious and was not able to sit still for ear exam with otoscope nor for manual removal.   Ear irrigation was most appropriate and done by CA.  I rechecked ears and was  able to use otoscope. TMs normal.  Denies fever/chills, wheezing, SOB, CP, n/v, rhinorrhea.     On reviewing meds, Pt reports he is no longer taking: Latuda 40mg, Depakote 250, Depakote 500mg, Ramelteon 8mg.     ROS:  Comprehensive Review of Systems Negative except stated in HPI.     No past medical history on file.  Patient Active Problem List   Diagnosis     Incomplete Emptying Of Bladder     Bipolar affective disorder (H)     Primary insomnia     Current Outpatient Medications   Medication Sig Dispense Refill     lamoTRIgine (LAMICTAL) 150 MG tablet   5     LATUDA 80 mg Tab   2     mirtazapine (REMERON) 15 MG tablet Take 15 mg by mouth at bedtime.       omeprazole (PRILOSEC) 40 MG capsule TAKE ONE CAPSULE BY MOUTH ONE TIME DAILY  90 capsule 3     QUEtiapine (SEROQUEL XR) 400 MG 24 hr tablet         5     dextromethorphan HBr 15 mg cap Take 15 mg by mouth daily. 30 each 0     lurasidone (LATUDA) 40 mg Tab tablet Take 40 mg by mouth daily.       prazosin (MINIPRESS) 1 MG capsule Take 1 capsule (1 mg total) by mouth at bedtime. 30 capsule 0     ramelteon (ROZEREM) 8 mg tablet Take 1 tablet (8 mg total) by mouth at bedtime as needed for sleep. (Patient not taking: Reported on 6/4/2019      ) 30 tablet 0     No current facility-administered medications for this visit.      Social History     Tobacco Use   Smoking Status Never Smoker   Smokeless Tobacco Never Used     OBJECTIVE: /68   Pulse 88   Temp 97.9  F (36.6  C) (Oral)   Resp 24   Ht 6' (1.829 m)   Wt 212 lb 8 oz (96.4 kg)   SpO2 98%   BMI 28.82 kg/m     No results found for this or any previous visit (from the past 24 hour(s)).    PHYSICAL:  General Alert, awake, not in acute distress.   HEENT:             -Head Atraumatic, normocephalic.            -Eyes PERRL, no erythema, discharge, conjunctiva clear.             -Ears TMs intact, no drainage, no erythema or edema (after large cerumen chunks were removed).             -Nose    Nostrils patent,   no edema.            -Throat Oropharynx without edema, erythema.  Uvula midline, no deviation.             -Neck Neck FROM, no adenopathy.  Thyroid not visibly enlarged.   CV Normal S1 & S2. No murmurs.   RESP Non-labored, RRR, CTAB. No wheezes or crackles.    EXTREMITY No edema, erythema, deformity. FROM.  Pulses present. Normal capillary refill.    PSYCH Anxious, speaking fast.        Rohan Mcgowan PA-C

## 2021-05-30 VITALS — BODY MASS INDEX: 28.84 KG/M2 | WEIGHT: 217.6 LBS | HEIGHT: 73 IN

## 2021-05-31 VITALS — BODY MASS INDEX: 30.48 KG/M2 | WEIGHT: 225 LBS | HEIGHT: 72 IN

## 2021-05-31 VITALS — HEIGHT: 72 IN | BODY MASS INDEX: 29.63 KG/M2 | WEIGHT: 218.75 LBS

## 2021-05-31 NOTE — TELEPHONE ENCOUNTER
Medication Request  Medication name: Nortriptyline HCL 10 mg Capsule  Sig:  Take one capsule by mouth at bedtime.   Pharmacy Name and Location: [facility] ([address])  Reason for request: Request received from patient's pharmacy via fax.   When did you use medication last?:  Information not provided.   Patient offered appointment:  N/a  Okay to leave a detailed message: no

## 2021-06-01 VITALS — BODY MASS INDEX: 27.22 KG/M2 | HEIGHT: 72 IN | WEIGHT: 201 LBS

## 2021-06-01 NOTE — TELEPHONE ENCOUNTER
Refill Approved    Rx renewed per Medication Renewal Policy. Medication was last renewed on 10/17/18.    Lucy Zurita, [facility] Triage/Med Refill 9/5/2019     Requested Prescriptions   Pending Prescriptions Disp Refills     omeprazole (PRILOSEC) 40 MG capsule [Pharmacy Med Name: Omeprazole Oral Capsule Delayed Release 40 MG] 90 capsule 2     Sig: TAKE ONE CAPSULE BY MOUTH ONE TIME DAILY       GI Medications Refill Protocol Passed - 9/5/2019 11:57 AM        Passed - PCP or prescribing provider visit in last 12 or next 3 months.     Last office visit with prescriber/PCP: 11/1/2016 Telly Arias MD OR same dept: 6/4/2019 Rohan Mcgowan PA-C OR same specialty: 6/4/2019 Rohan Mcgowan PA-C  Last physical: 10/4/2018 Last MTM visit: Visit date not found   Next visit within 3 mo: Visit date not found  Next physical within 3 mo: Visit date not found  Prescriber OR PCP: Telly Arias MD  Last diagnosis associated with med order: 1. GERD (gastroesophageal reflux disease)  - omeprazole (PRILOSEC) 40 MG capsule [Pharmacy Med Name: Omeprazole Oral Capsule Delayed Release 40 MG]; TAKE ONE CAPSULE BY MOUTH ONE TIME DAILY   Dispense: 90 capsule; Refill: 2    If protocol passes may refill for 12 months if within 3 months of last provider visit (or a total of 15 months).

## 2021-06-02 VITALS — HEIGHT: 72 IN | WEIGHT: 212 LBS | BODY MASS INDEX: 28.71 KG/M2

## 2021-06-02 VITALS — BODY MASS INDEX: 28.6 KG/M2 | HEIGHT: 72 IN | WEIGHT: 211.19 LBS

## 2021-06-02 VITALS — BODY MASS INDEX: 27.93 KG/M2 | HEIGHT: 72 IN | WEIGHT: 206.25 LBS

## 2021-06-02 VITALS — BODY MASS INDEX: 29.93 KG/M2 | WEIGHT: 221 LBS | HEIGHT: 72 IN

## 2021-06-02 VITALS — BODY MASS INDEX: 28.34 KG/M2 | HEIGHT: 72 IN | WEIGHT: 209.25 LBS

## 2021-06-03 VITALS — HEIGHT: 72 IN | BODY MASS INDEX: 28.78 KG/M2 | WEIGHT: 212.5 LBS

## 2021-06-03 VITALS
DIASTOLIC BLOOD PRESSURE: 80 MMHG | SYSTOLIC BLOOD PRESSURE: 106 MMHG | RESPIRATION RATE: 28 BRPM | TEMPERATURE: 98.3 F | OXYGEN SATURATION: 97 % | HEART RATE: 89 BPM

## 2021-06-03 VITALS
RESPIRATION RATE: 16 BRPM | DIASTOLIC BLOOD PRESSURE: 84 MMHG | BODY MASS INDEX: 28.31 KG/M2 | HEART RATE: 95 BPM | WEIGHT: 209 LBS | OXYGEN SATURATION: 99 % | SYSTOLIC BLOOD PRESSURE: 122 MMHG | HEIGHT: 72 IN | TEMPERATURE: 98 F

## 2021-06-03 NOTE — TELEPHONE ENCOUNTER
Provider tried calling pt yesterday about lab results. No one home.      PSA is normal and UA has mucus, bacteria.  What can be relayed to pt?  Thanks.

## 2021-06-03 NOTE — TELEPHONE ENCOUNTER
Patient calling. Wanting results of U/A and PSA.    Please call with results.    Maryjane Calles RN  [facility] Triage/refill nurse

## 2021-06-03 NOTE — PROGRESS NOTES
"  Chief Complaint   Patient presents with     Feeling pressure darryl  below.     For  about  6 monts.         HPI:   Tripp Sanders is a 49 y.o. male with girlfriend has seen tiny spots of blood on underwear.  Has not seen any blood in his urine.  No dysuria.    No flank.  No blood in stool.  No fever.    Drinks 17 drinks twice a week.  Has been to AA before. wabts to quit    ROS:  C/o ongoing abdominal pain otherwise no change    Medications:  Current Outpatient Medications on File Prior to Visit   Medication Sig Dispense Refill     dextromethorphan HBr 15 mg cap Take 15 mg by mouth daily. 30 each 0     lamoTRIgine (LAMICTAL) 150 MG tablet   5     LATUDA 80 mg Tab   2     omeprazole (PRILOSEC) 40 MG capsule TAKE ONE CAPSULE BY MOUTH ONE TIME DAILY 90 capsule 2     prazosin (MINIPRESS) 1 MG capsule Take 1 capsule (1 mg total) by mouth at bedtime. 30 capsule 0     QUEtiapine (SEROQUEL XR) 400 MG 24 hr tablet         5     No current facility-administered medications on file prior to visit.          Social History:  Social History     Tobacco Use     Smoking status: Never Smoker     Smokeless tobacco: Never Used   Substance Use Topics     Alcohol use: Yes     Alcohol/week: 17.0 standard drinks     Types: 17 Shots of liquor per week     Comment: for a week.         Physical Exam:   Vitals:    11/25/19 1441   BP: 138/88   Pulse: (!) 121   Resp: 22   Temp: 98.2  F (36.8  C)   TempSrc: Oral   SpO2: 93%   Weight: 204 lb 8 oz (92.8 kg)   Height: 6' 0.17\" (1.833 m)       GEN:  Anxious.  Interrupts. Frequently changing position  :  Normal circumcised penis without lesions.  Normal urethral meatus.  Testes bilaterally descended without lumps or pain    LABS:  Results for orders placed or performed in visit on 11/25/19   Urinalysis-UC if Indicated   Result Value Ref Range    Color, UA Yellow Colorless, Yellow, Straw, Light Yellow    Clarity, UA Clear Clear    Glucose, UA Negative Negative    Bilirubin, UA Negative Negative "    Ketones, UA Negative Negative    Specific Gravity, UA 1.010 1.005 - 1.030    Blood, UA Negative Negative    pH, UA 6.0 5.0 - 8.0    Protein, UA Negative Negative mg/dL    Urobilinogen, UA 0.2 E.U./dL 0.2 E.U./dL, 1.0 E.U./dL    Nitrite, UA Negative Negative    Leukocytes, UA Small (!) Negative    Bacteria, UA None Seen None Seen hpf    RBC, UA 0-2 None Seen, 0-2 hpf    WBC, UA 0-5 None Seen, 0-5 hpf    Squam Epithel, UA 0-5 None Seen, 0-5 lpf        Assessment/Plan:    1. Microscopic hematuria  Urinalysis-UC if Indicated    Culture, Urine      He showed me his underwear with about a 2mm spot of blood in the front.  I discussed with him that that looked like it came from skin bleeding.  His  exam was normal and I saw no skin lesions.  Urine was clear.  He was biting cuticles during the visit.  It is very likely that the spot of blood on his underwear was from some bleeding from trauma to his fingers.    He was advised to quit drinking and start attending AA again.    He will make an appointment with his primary to further discuss the stomach symptoms.  Discussed that his drinking could be causing it.          Magen Driscoll MD      11/25/2019    The following portions of the patient's history were reviewed and updated as appropriate: allergies, current medications, past family history, past medical history, past social history, past surgical history and problem list.

## 2021-06-03 NOTE — PROGRESS NOTES
"MALE PREVENTATIVE EXAM    Assessment and Plan:     1. Annual physical exam  -  Healthy exam; Pt decline  and Rectal exam.   Orders:   - Lipid Cascade RANDOM     Next follow up:  Yearly for Annual exam.    Immunization Review  Adult Imm Review: Declines immunizations today: Influenza    I discussed the following with the patient:   Adult Healthy Living: Importance of regular exercise    Subjective:   Chief Complaint: Tripp Sanders is an 49 y.o. male here for a preventative health visit.     Pt reports feeling well despite some abdominal pain he has last week, \"It just went away.\"  He was overly concerned about throwing up his stomach intestines. Reassurance given.  He is also concerned about having Prostate cancer. Reviewed PSA labs and reassurance given as he did not have any sxs suggesting cancer. He decline  and prostate exam.    Pt does not drink or use tobacco.  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  Advised STD testing but Pt declined.     Denies fevers/chills, shortness of breath/wheezing, n/v, abdominal pain, hematochezia, diarrhea/constipation, hematuria, penile discharge/pain, weak urine stream, dysuria.     Has tried Cologuard in the recent past, however Pt was not able to complete correctly.  Pt declines Colonoscopy.    Healthy Habits  Are you taking a daily aspirin? No  Do you typically exercising at least 40 min, 3-4 times per week?  NO  Do you usually eat at least 4 servings of fruit and vegetables a day, include whole grains and fiber and avoid regularly eating high fat foods? NO  Have you had an eye exam in the past two years? NO  Do you see a dentist twice per year? NO  Do you have any concerns regarding sleep? No    Safety Screen  If you own firearms, are they secured in a locked gun cabinet or with trigger locks? The patient does not own any firearms  Do you feel you are safe where you are living?: Yes (11/14/2019  3:00 PM)  Do you feel you are safe in your " relationship(s)?: Yes (11/14/2019  3:00 PM)    Review of Systems:  Please see above.  The rest of the review of systems are negative for all systems.     Cancer Screening       Status Date      COLONOSCOPY Overdue 6/10/2018         Patient Care Team:  Telly Arias MD as PCP - General (Family Medicine)  Telly Arias MD as Assigned PCP  Juanjo Cason MD as Assigned PCP    History     Reviewed By Date/Time Sections Reviewed    Diana Salvador CMA 11/14/2019  3:02 PM Tobacco        Objective:   Vital Signs:   Visit Vitals  /80   Pulse 89   Temp 98.3  F (36.8  C) (Oral)   Resp 28   SpO2 97%      The 10-year ASCVD risk score ([place] DC Jr., et al., 2013) is: 2.6%    Values used to calculate the score:      Age: 49 years      Sex: Male      Is Non- : No      Diabetic: No      Tobacco smoker: No      Systolic Blood Pressure: 106 mmHg      Is BP treated: No      HDL Cholesterol: 44 mg/dL      Total Cholesterol: 176 mg/dL    PHYSICAL EXAM    Constitutional:  Well-developed and well-nourished, active, no apparent distress.   HEENT: Head atraumatic, normocephalic. TM's intact bilaterally.  Ext canals with no erythema or discharge.  PERR. Conjuctivae clear, no discharge or erythema.  Nose:  Nostrils patent, no polyps.  Mouth/Throat: Pharynx clear.  Mucous membranes moist, without lesions.  Dentition and gums normal.   Neck: Normal range of motion, trachea midline.   Cardiovascular: Normal RRR, no murmurs.   Pulmonary/Chest: Respiration without effort, normal breath sounds. Lungs sound clear bilaterally, without wheezes or crackles.   Abdominal: Soft, normal bowel sounds. No masses, organomegaly, rigidity, or guarding.  Genitourinary: Pt decline.   Rectal: Pt decline.   Musculoskeletal: Normal range of motion.  Vertebrae without deformity.  No edema, tenderness or deformity.  Lymphadenopathy:  No cervical adenopathy.   Neurological:  Alert. Normal reflexes, tone and strength.   Skin: Skin is warm and  dry, no rash or lesions, no jaundice.   Psych:  Interactive, anxious, appears normal and appropriate for age.      Rohan Mcgowan PA-C

## 2021-06-03 NOTE — PROGRESS NOTES
"ASSESMENT AND PLAN:  1. Incomplete Emptying Of Bladder  -  Endorse urinary frequency.  -  Denies hematuria, pain with urination, weak urine stream, fevers/chills, flank pain, hx of STDs.  -  Pt declines  exam.    Orders:   - Urine,Microscopic    2. Screening for Prostate Cancer  - Pt is overly concerned for Prostate cancer due to his urinary frequency.   - Reassurance given as Pt was very anxious; he does NOT want to hear any \"bad news\".  - Recommend Prostate exam but Pt decline.    - Reassurance given and will call Pt with results.    Orders:   - PSA (Prostatic-Specific Antigen), Diagnostic     3. Colon Cancer screening  -  He is afraid he might have Colon cancer bc he saw small speck of blood on his underwear.  Denies hematochezia, abdominal pain, dark tarry stools, significant weight loss.  -  Pt has tried Cologuard in the recent past but was not able to collect specimen correctly.  -  Pt does have Constipation and takes stool softener.  Last BM 2 days ago. Encourage increasing fiber and keeping hydrated.  -  Reassurance given and encourage Pt to consider having Colonoscopy.    Reviewed Medical/Social history and Medications.  No new changes.  Over 25 minutes total time spent with patient, with more than 50% in counseling and coordination of care on the above issues.   Discussed indications for emergent medical attention and routine F/u.  Patient/Parent/Guardian engaged in decision making process and verbalized understanding of the options discussed and agreed with the final treatment plan.     SUBJECTIVE:  Tripp Sanders is a 49 y.o. male who presents  for concerns for Prostate and Colon cancer.    Pt states he noticed small speck of blood on his underwear couple days ago and is very anxious he has cancer, \"Don't tell me any bad news.  I'm an atheist and I don't want to die.\"   Pt has not noticed any more blood present. Discussed prostate exam and referral for Colonoscopy but he declined.  Pt is very anxious " but did not want testing done.  He does not have any sxs that is consistent with Prostate or Colon cancer.  Small speck of blood is most likely from his constipation.  He has occasional constipation and has been taking stool softeners. Last BM 2 days ago. Advised increasing fiber and keeping well hydrated.  May try OTC metamucil.     Denies fever/chills, wheezing, SOB, CP, n/v, abdominal pain, diarrhea,  hematochezia, hematuria, flank pain.      ROS:  Comprehensive Review of Systems Negative except stated in HPI.     No past medical history on file.  Patient Active Problem List   Diagnosis     Incomplete Emptying Of Bladder     Bipolar affective disorder (H)     Primary insomnia     Current Outpatient Medications   Medication Sig Dispense Refill     dextromethorphan HBr 15 mg cap Take 15 mg by mouth daily. 30 each 0     lamoTRIgine (LAMICTAL) 150 MG tablet   5     LATUDA 80 mg Tab   2     mirtazapine (REMERON) 15 MG tablet Take 15 mg by mouth at bedtime.       omeprazole (PRILOSEC) 40 MG capsule TAKE ONE CAPSULE BY MOUTH ONE TIME DAILY 90 capsule 2     prazosin (MINIPRESS) 1 MG capsule Take 1 capsule (1 mg total) by mouth at bedtime. 30 capsule 0     QUEtiapine (SEROQUEL XR) 400 MG 24 hr tablet         5     No current facility-administered medications for this visit.      Social History     Tobacco Use   Smoking Status Never Smoker   Smokeless Tobacco Never Used     OBJECTIVE: /84   Pulse 95   Temp 98  F (36.7  C) (Oral)   Resp 16   Ht 6' (1.829 m)   Wt 209 lb (94.8 kg)   SpO2 99%   BMI 28.35 kg/m     Recent Results (from the past 24 hour(s))   Urine,Microscopic    Collection Time: 11/06/19  9:12 AM   Result Value Ref Range    Bacteria, UA Few (!) None Seen hpf    RBC, UA 0-2 None Seen, 0-2 hpf    WBC, UA 0-5 None Seen, 0-5 hpf    Squam Epithel, UA 0-5 None Seen, 0-5 lpf    Amorphous, UA Many (!) None Seen    Mucus, UA Many (!) None Seen lpf     PHYSICAL:  General Alert, awake, not in acute distress.    CV Normal S1 & S2. No murmurs.   RESP Non-labored, RRR, CTAB. No wheezes or crackles.    ABDOMEN Soft, non-tender. No rigidity, guarding.  Normal bowel sounds.    BACK No flank pain.    Pt decline.   RECTUM Pt decline.       Rohan Mcgowan PA-C

## 2021-06-03 NOTE — TELEPHONE ENCOUNTER
Pt is worried he has Prostate cancer. Please inform him there is nothing in his labs to indicate Prostate cancer. His urine is normal.

## 2021-06-03 NOTE — PROGRESS NOTES
ASSESMENT AND PLAN:  1. Microscopic hematuria  - Pt noticed some small specks of blood on underwear.   - Denies hematuria, injury or trauma, STDs, fevers/chills, flank pain, n/v.  - Pt decline  exam and states he had it done last week.  - Reviewed labs from 11/6 and 11/25. Discussed in detail reasons why Pt does not have bladder cancer; reassurance given.  - Follow up if symptoms not better or worsens.    Reviewed Medical/Social history and Medications. No new changes.  Over 25 minutes total time spent with patient, with more than 50% in counseling and coordination of care on the above issues.   Discussed indications for emergent medical attention and routine F/u.  Patient/Parent/Guardian engaged in decision making process and verbalized understanding of the options discussed and agreed with the final treatment plan.     SUBJECTIVE:  Tripp Sanders is a 49 y.o. male who presents  for evaluation of his Genital bleeding (reoccurring issue).    Pt is afraid he might have bladder cancer. Went over labs from 11/6 and 11/25. UA negative for blood or infection.  He is afraid he might have big blood clot in his arteries that is leaking out blood in his penis.      Pt saw Dr. Driscoll last week and believes it may be from his nail biting.  Pt is constantly biting nail and does not believe it's from his fingers. He states he has not had sex in 5 years and does not believe its anything sexually related.  He declines  exam.  Reassurance given and went over labs again.  Pt was satisfied and will rtc if new or worsening sxs occurs.     Denies fever/chills, flank pain, wheezing, CP, n/v, abdominal pain, diarrhea/constipation, hematochezia, hematuria, dysuria.    ROS:  Comprehensive Review of Systems Negative except stated in HPI.     No past medical history on file.  Patient Active Problem List   Diagnosis     Bipolar affective disorder (H)     Primary insomnia     Alcohol abuse     Anxiety     Current Outpatient Medications  "  Medication Sig Dispense Refill     atomoxetine (STRATTERA) 18 MG capsule take 1 capsule by mouth  daily in the morning with food  1     lamoTRIgine (LAMICTAL) 150 MG tablet   5     LATUDA 80 mg Tab   2     nortriptyline (PAMELOR) 10 MG capsule Take 10 mg by mouth at bedtime.  0     omeprazole (PRILOSEC) 40 MG capsule TAKE ONE CAPSULE BY MOUTH ONE TIME DAILY 90 capsule 2     prazosin (MINIPRESS) 1 MG capsule Take 1 capsule (1 mg total) by mouth at bedtime. 30 capsule 0     QUEtiapine (SEROQUEL XR) 400 MG 24 hr tablet         5     No current facility-administered medications for this visit.      Social History     Tobacco Use   Smoking Status Never Smoker   Smokeless Tobacco Never Used     OBJECTIVE: /72   Pulse 90   Temp 97.8  F (36.6  C) (Oral)   Resp 24   Ht 6' 0.2\" (1.834 m)   Wt 208 lb 4 oz (94.5 kg)   SpO2 98%   BMI 28.09 kg/m     No results found for this or any previous visit (from the past 24 hour(s)).    PHYSICAL:  General Alert, awake, not in acute distress.   CV Normal S1 & S2. No murmurs.   RESP Non-labored, RRR, CTAB. No wheezes or crackles.    ABDOMEN Soft,non-tender. No rigidity, guarding.  Normal bowel sounds.     Pt decline.   PSYCH Anxious and hyper.        Rohan Mcgowan PA-C         "

## 2021-06-04 VITALS
WEIGHT: 206 LBS | TEMPERATURE: 97.9 F | HEART RATE: 101 BPM | SYSTOLIC BLOOD PRESSURE: 94 MMHG | DIASTOLIC BLOOD PRESSURE: 64 MMHG | OXYGEN SATURATION: 96 % | RESPIRATION RATE: 16 BRPM | HEIGHT: 72 IN | BODY MASS INDEX: 27.9 KG/M2

## 2021-06-04 VITALS
TEMPERATURE: 97.8 F | DIASTOLIC BLOOD PRESSURE: 72 MMHG | WEIGHT: 208.25 LBS | BODY MASS INDEX: 28.21 KG/M2 | SYSTOLIC BLOOD PRESSURE: 120 MMHG | HEART RATE: 90 BPM | OXYGEN SATURATION: 98 % | HEIGHT: 72 IN | RESPIRATION RATE: 24 BRPM

## 2021-06-04 VITALS
OXYGEN SATURATION: 95 % | HEIGHT: 72 IN | TEMPERATURE: 97.7 F | HEART RATE: 63 BPM | DIASTOLIC BLOOD PRESSURE: 64 MMHG | BODY MASS INDEX: 27.09 KG/M2 | WEIGHT: 200 LBS | SYSTOLIC BLOOD PRESSURE: 120 MMHG | RESPIRATION RATE: 28 BRPM

## 2021-06-04 VITALS
TEMPERATURE: 98 F | HEART RATE: 93 BPM | OXYGEN SATURATION: 98 % | HEIGHT: 72 IN | SYSTOLIC BLOOD PRESSURE: 112 MMHG | BODY MASS INDEX: 28.44 KG/M2 | DIASTOLIC BLOOD PRESSURE: 80 MMHG | WEIGHT: 210 LBS | RESPIRATION RATE: 16 BRPM

## 2021-06-04 VITALS
TEMPERATURE: 97.8 F | DIASTOLIC BLOOD PRESSURE: 64 MMHG | RESPIRATION RATE: 16 BRPM | OXYGEN SATURATION: 98 % | SYSTOLIC BLOOD PRESSURE: 112 MMHG | HEIGHT: 72 IN | BODY MASS INDEX: 29.26 KG/M2 | HEART RATE: 98 BPM | WEIGHT: 216 LBS

## 2021-06-04 VITALS
TEMPERATURE: 98 F | RESPIRATION RATE: 16 BRPM | SYSTOLIC BLOOD PRESSURE: 106 MMHG | HEART RATE: 90 BPM | HEIGHT: 72 IN | BODY MASS INDEX: 30.07 KG/M2 | DIASTOLIC BLOOD PRESSURE: 76 MMHG | WEIGHT: 222 LBS | OXYGEN SATURATION: 98 %

## 2021-06-04 VITALS
RESPIRATION RATE: 24 BRPM | TEMPERATURE: 97.8 F | WEIGHT: 212.75 LBS | HEIGHT: 72 IN | HEART RATE: 111 BPM | BODY MASS INDEX: 28.82 KG/M2 | DIASTOLIC BLOOD PRESSURE: 70 MMHG | SYSTOLIC BLOOD PRESSURE: 100 MMHG | OXYGEN SATURATION: 94 %

## 2021-06-04 VITALS
DIASTOLIC BLOOD PRESSURE: 80 MMHG | BODY MASS INDEX: 30.65 KG/M2 | HEART RATE: 100 BPM | SYSTOLIC BLOOD PRESSURE: 108 MMHG | WEIGHT: 226 LBS

## 2021-06-04 VITALS
SYSTOLIC BLOOD PRESSURE: 120 MMHG | OXYGEN SATURATION: 96 % | HEART RATE: 97 BPM | WEIGHT: 217 LBS | TEMPERATURE: 98.4 F | HEIGHT: 72 IN | BODY MASS INDEX: 29.39 KG/M2 | DIASTOLIC BLOOD PRESSURE: 72 MMHG | RESPIRATION RATE: 20 BRPM

## 2021-06-04 VITALS
WEIGHT: 201 LBS | TEMPERATURE: 98.4 F | DIASTOLIC BLOOD PRESSURE: 70 MMHG | SYSTOLIC BLOOD PRESSURE: 112 MMHG | OXYGEN SATURATION: 98 % | HEIGHT: 72 IN | RESPIRATION RATE: 20 BRPM | BODY MASS INDEX: 27.22 KG/M2 | HEART RATE: 105 BPM

## 2021-06-04 VITALS
OXYGEN SATURATION: 93 % | DIASTOLIC BLOOD PRESSURE: 88 MMHG | WEIGHT: 204.5 LBS | RESPIRATION RATE: 22 BRPM | SYSTOLIC BLOOD PRESSURE: 138 MMHG | HEART RATE: 121 BPM | TEMPERATURE: 98.2 F | HEIGHT: 72 IN | BODY MASS INDEX: 27.7 KG/M2

## 2021-06-04 NOTE — TELEPHONE ENCOUNTER
paliperidone palmitate IM injection 234 mg (INVEGA SUSTENNA)   [[id number]]   Order Details   Ordered Dose: 234 mg Route: Intramuscular Frequency: Once   Administration Dose: 234 mg      Scheduled Start Date/Time: 12/24/19 0000 End Date/Time: 12/10/19 1429 after 1 doses        Admin Instructions:   Administer in the gluteal or deltoid muscle using appropriate supplied needle. See instructions in dose kit regarding needle, dose preparation, and administration. New initiations restricted to Psychiatry.        Diagnosis Association: Schizoaffective disorder, bipolar type (H) (F25.0)      Order Status: Completed Tue Dec 10, 2019 1429, originally scheduled to end     NF Ordered: Yes NF post-verification: Yes Non-Formulary Code: --   Ordering User: Telly Arias MD Ordering Date/Time: Tue Dec 10, 2019 1409   Ordering Provider: Telly Arias MD

## 2021-06-04 NOTE — PROGRESS NOTES
"ASSESMENT AND PLAN:  Diagnoses and all orders for this visit:    Schizoaffective disorder, bipolar type (H)  Excellent response to the injection of the last visit, see that note for details.  After getting the patient's permission today, we are faxing over my clinic note from last visit to his psychiatrist at [facility] because he has a follow-up appointment with her next week and it can be determined at that time whether the injection should be continued on a regular basis and whether his oral medication should be changed.  Not to re-dose the Invega today given he has a psychiatry follow-up appointment next week.  patient counseled extensively today on the plan and answered all of his questions to the best of my ability but had to defer some of the questions around his other medications to his psychiatrist.  Patient congratulated on his 8 days of complete abstinence from marijuana use and alcohol use.  Extensive counseling with the patient today on the likely causes of his physical symptoms and the likely correlation between those physical symptoms and his exacerbated mental illness.  Explained to him in the best common language possible that I think he is experiencing \"overactive nerves\" that would explain much of his symptomatology.  He would like to continue with weekly follow-up with me for now, which means next week he will be seeing his psychiatrist on the 24th and then see me on the 26th.  I gave him a card with a message on it reminding him to specifically request that his psychiatrist sends me his report from his visit so I am in the loop on the plan for his medications going forward.  We discussed indications for emergent follow-up in the meantime.    Over 25 minutes of total face-to-face time, more than half in counseling and coordination of care on the above.         SUBJECTIVE: 49-year-old male comes in for follow-up, see last week's note for details, since then he acknowledges he has had excellent " "improvement in his sleep and his bipolar symptoms.  He reports that he has completely stopped using marijuana and has now used no marijuana for 8 days.  He has not used any alcohol.  Overall, he is feeling more able to manage his day-to-day life.  He reports that he has not had a significant visual hallucinations since receiving the injection and the intensity of the auditory hallucinations has improved, he has had some time that has been free of the \"background noises\" that have been bothering him over the last several weeks.  He continues to worry about the physical symptoms that he has, he gets intermittent feeling of patchy coldness on his skin.  He also has 2 days of some pressure in his ears and congestion.  Patient continues to be concerned about the episodes of vomiting that he had when he was using alcohol few weeks ago where he vomited up yellowish-gray mass that had a consistency that would fit with mucus.  When he did that, he felt a pain and disruption in his right inguinal area.    No past medical history on file.  Patient Active Problem List   Diagnosis     Bipolar affective disorder (H)     Primary insomnia     Alcohol abuse     Anxiety     Schizoaffective disorder, bipolar type (H)     Current Outpatient Medications   Medication Sig Dispense Refill     lamoTRIgine (LAMICTAL) 150 MG tablet Take 300 mg by mouth daily.   5     LATUDA 80 mg Tab   2     omeprazole (PRILOSEC) 40 MG capsule TAKE ONE CAPSULE BY MOUTH ONE TIME DAILY 90 capsule 2     QUEtiapine (SEROQUEL) 200 MG tablet Take 200 mg by mouth at bedtime. Take with 300 mg tablet for total of 500 mg at bedtime       QUEtiapine (SEROQUEL) 300 MG tablet Take 300 mg by mouth at bedtime. Take with 200 mg for total of 500 mg nightly       atomoxetine (STRATTERA) 18 MG capsule take 1 capsule by mouth  daily in the morning with food  1     nortriptyline (PAMELOR) 10 MG capsule Take 10 mg by mouth at bedtime.  0     prazosin (MINIPRESS) 1 MG capsule Take " 1 capsule (1 mg total) by mouth at bedtime. 30 capsule 0     No current facility-administered medications for this visit.      Social History     Tobacco Use   Smoking Status Never Smoker   Smokeless Tobacco Never Used       OBJECTICE: BP 94/64   Pulse (!) 101   Temp 97.9  F (36.6  C) (Oral)   Resp 16   Ht 6' (1.829 m)   Wt 206 lb (93.4 kg)   SpO2 96%   BMI 27.94 kg/m       No results found for this or any previous visit (from the past 24 hour(s)).     GEN-alert, less manic, in no acute distress   HEENT-tympanic membranes both look normal.  Neck is supple.   CV-regular rate and rhythm with no murmur   RESP-lungs clear to auscultation   Genitourinary-no hernia on exam.  No palpable testicular mass or other inguinal abnormality on either side.   Psychiatric-appearance is less disheveled, his speech is less pressured, continues to have some tangential thinking and perseveration, no suicidal or homicidal ideation.    Telly Arias

## 2021-06-04 NOTE — TELEPHONE ENCOUNTER
Looks like there is an appt made for him to see you on 12/10@ 1:40. I will change it to ED follow up, but do you want to leave it at 20 mins, or should I change it to 40 (will be a double overlap appt)?

## 2021-06-04 NOTE — PROGRESS NOTES
ASSESMENT AND PLAN:  Diagnoses and all orders for this visit:    Schizoaffective disorder, bipolar type (H) versus manic exacerbation of bipolar disorder with psychotic features versus other  Extensive discussion and counseling today with the patient on options for management of this acute mental health crisis.  He had a crisis evaluation and ER visit 2 days ago, we reviewed that note in detail including the work-up results, see that note for further details.  Counseling done with the patient on indications to return to the emergency room for further emergent evaluation and possible admission.  However, I am hopeful we can avoid that with aggressive treatment starting today and I had extensive discussions with the patient around the risks and benefits of using Invega injection.  Patient agrees to proceed and injection was done today in clinic as detailed below.  He was observed for 30 minutes in the clinic after the injection and tolerated it well.  He will be coming back to see me again in 1 week for a follow-up visit and likely 156 mg Invega injection at that time.  I repeatedly counseled the patient on the extreme importance of him bringing all of his medication bottles into that visit so we can determine how adherent he has been to his medication regiment, and what medications he is currently taking.  I am highly suspicious that he is not adherent to his current psychiatric medications.  That is 1 of the main reasons why I encouraged the patient to consider the medication detailed below which could become a long-term maintenance medication with injections every 4 to 6 weeks.  Patient is open to this idea.  Next week we would discuss tapering down his quetiapine if things are going well and if indeed he is even taking his quetiapine currently.    Extensive counseling with the patient also around the extreme importance of his stopping the use of marijuana.  We did talked about the possibility that marijuana is  causing or contributing to his psychosis symptoms.  He agrees to make his best effort to quit using.  Depending on how he looks next week at his follow-up, we can think about trying to coordinate further follow-up with his psychiatry clinic.  However, they have been completely unwilling to work with me in the past in terms of sharing information about this patient, apparently because the patient did not sign the appropriate releases.    -     paliperidone palmitate IM injection 234 mg (INVEGA SUSTENNA) done today in clinic.      Over 40 minutes of total face-to-face time, more than half in counseling and coordination of care on the above.     SUBJECTIVE: 49-year-old male with history of chronic disabling mental illness, cared for by psychiatry clinic but we are unable to get medical records from them and communication has difficulty in the past with this clinic.  According to the paperwork that the patient brings him with him today with his recent hospital crisis evaluation, he was not admitted, he is taking Latuda, quetiapine, and multiple other psychiatric medications as listed in the medication section of the chart.  However, based on my discussion with the patient and history taken today he is certainly not taking all the medications as prescribed and I am wondering whether he is taking any of his medications given additional history obtained from his father who drove him into clinic today.  Prior to meeting with the patient, his father did catch me in the hallway and we went into a separate exam room so his father could share with me his concerns.  His father is concerned that the patient may not be taking his medications that instead he has been self-medicating with daily marijuana use.  Father has been concerned by witnessing and his son increasing erratic behavior, pressured speech, and perseveration on physical symptoms with a repeated worry that he is going to die.    Patient is having difficulty  "organizing his thoughts.  He is having regular visual and auditory hallucinations over the last 2 weeks.  Patient reports that \"do you remember LSD, it is like taking LSD but the hallucinations are even more extreme.\"  He reports distorted auditory and visual senses.  He feels like the symptoms have been getting worse over the last 2 weeks.  This is the main reason that he went into the emergency room patient also feels a constant sensation of buzzing and tingling and hot and cold fluctuation in his head and body.  He is very concerned that he has a medical condition causing the symptoms that could be a fatal condition.  He brings in today a page long written differential diagnosis that includes things like stroke, aneurysm, neuropathy, multiple sclerosis, and other neurologic conditions.  Patient does have some insight related to the fact that many of his symptoms could be related to exacerbation of his underlying mental illness.  He acknowledges that his bipolar disorder has been \"freaking out my family\" as things have worsened over the last few weeks.  Patient currently lives with his father and other family members.  He also shows some insight around his marijuana daily use and knows that he should \"get back to being 100% sober\", he denies any use of any other chemicals besides marijuana.  He reports that he did have a relapse with his alcohol a couple of months ago but this has resolved completely.    No past medical history on file.  Patient Active Problem List   Diagnosis     Bipolar affective disorder (H)     Primary insomnia     Alcohol abuse     Anxiety     Schizoaffective disorder, bipolar type (H)     Current Outpatient Medications   Medication Sig Dispense Refill     lamoTRIgine (LAMICTAL) 150 MG tablet Take 300 mg by mouth daily.   5     nortriptyline (PAMELOR) 10 MG capsule Take 10 mg by mouth at bedtime.  0     omeprazole (PRILOSEC) 40 MG capsule TAKE ONE CAPSULE BY MOUTH ONE TIME DAILY 90 capsule 2 "     QUEtiapine (SEROQUEL) 200 MG tablet Take 200 mg by mouth at bedtime. Take with 300 mg tablet for total of 500 mg at bedtime       QUEtiapine (SEROQUEL) 300 MG tablet Take 300 mg by mouth at bedtime. Take with 200 mg for total of 500 mg nightly       atomoxetine (STRATTERA) 18 MG capsule take 1 capsule by mouth  daily in the morning with food  1     LATUDA 80 mg Tab   2     prazosin (MINIPRESS) 1 MG capsule Take 1 capsule (1 mg total) by mouth at bedtime. 30 capsule 0     No current facility-administered medications for this visit.      Social History     Tobacco Use   Smoking Status Never Smoker   Smokeless Tobacco Never Used       OBJECTICE: /70   Pulse (!) 105   Temp 98.4  F (36.9  C) (Oral)   Resp 20   Ht 6' (1.829 m)   Wt 201 lb (91.2 kg)   SpO2 98%   BMI 27.26 kg/m       No results found for this or any previous visit (from the past 24 hour(s)).     GEN-alert, manic appearing, mildly disheveled.   CV-regular rate and rhythm with no murmur   RESP-lungs clear to auscultation   Neurologic-cranial nerves II through XII are intact, strength and sensation are intact and symmetric.   Psychiatric- appearance is poorly groomed, mood is not depressed, affect is elevated, speech is pressured, thought content negative for suicidal or homicidal ideation, thought processing positive for perseveration and hallucination, judgment is intact, insight is intact.    Telly Arias

## 2021-06-04 NOTE — PROGRESS NOTES
The clinic Community Health Worker talked with the patient today at the request of the PCP to discuss possible Clinic Care Coordination enrollment.  The service was described to the patient and immediate needs were discussed.  The patient declined enrollment at this time  and states that he has an appointment with his MD tomorrow regarding his hallucinations.  Patient denies any need for assistance with medication or support.  The PCP is encouraged to refer in the future if the patient could benefit from CCC.     Referral Reason: CC for medication management and SW assist for resources.

## 2021-06-04 NOTE — TELEPHONE ENCOUNTER
Last night woke up with sharp pain in neck for a few seconds. He thinks he slept on it funny.    Was seen yesterday. It was mentioned in OV notes.    Yes, he can touch to chest.  No fever.    Rates pain now as mild. No ha or pain now. No numbness in arms or tingling.    Continue to monitor.    Caller agrees with care advice given. Agreed to call back if patient has additional symptoms or questions.    Elly Herrera RN Triage and refills    Reason for Disposition    Neck pain or stiffness    Protocols used: NECK PAIN OR [id number]

## 2021-06-04 NOTE — PROGRESS NOTES
ASSESMENT AND PLAN:  Diagnoses and all orders for this visit:    Schizoaffective disorder, bipolar type (H)  Continued steady improvement in his recent severe exacerbation likely secondary to his Invega injection.  Updated the patient's medication list based on his oral report of the recommendations made by his wrist but we do not have any records from the psychiatry clinic.  Patient reports that his psychiatrist told him that he should continue to get the regular Invega injection here at the clinic and we will plan for him to get his next injection on 1/9/2020.  Patient prefers to follow-up weekly till then so he will have a follow-up appointment scheduled for next week.  Extensive counseling today with the patient on the pathophysiology of his problems and the treatment plan.  He will also continue to follow-up with his psychiatrist-Dr Arriaza at [facility], it looks like his next appointment is scheduled for January 14.    Shortness of breath  Reviewed the emergency room evaluation and test results with the patient today in detail.  Extensive counseling on the differential diagnosis.  Most likely multifactorial and secondary to his recent and ongoing exacerbation of his bipolar disorder along with possibly a recent viral illness, especially given the slightly detail today.  We discussed indications for follow-up and will be rechecking again next week.    Over 25 minutes of total face-to-face time, more than half in counseling and coordination of care on the above.     SUBJECTIVE: 49-year-old male comes in today for follow-up.  His pressured speech and perseveration and anxiety levels have been steadily improving and have been noticed by him and people around him.  He has continued to stay completely sober of drugs and alcohol-he has not used marijuana since 12/9/2019 and has not used alcohol since prior to that.  Patient reports that it is difficult for him sometimes to resist the urge to use marijuana,  especially since he reports that there is a family friend that comes to their home frequently and uses marijuana there.  Patient reports that he has an ongoing sensation of tingling in his head diffusely and intermittently in his body.  Patient has also struggled with some shortness of breath last week for which he went to the emergency room.  Since then, he has had some improvement in the shortness of breath but he still worries about what caused it.  He did have a chest x-ray that showed a mild likely viral abnormality and he reports that the symptoms have been improving since then.  No fevers, no productive cough.  Patient did meet with his psychiatrist since his last visit with me.  He reports that she approved of the Invega injection and recommended that he continue to get it.  He also reports that she discontinued his lamotrigine and reduced his dose of Latuda but is not sure of the current dose.    No past medical history on file.  Patient Active Problem List   Diagnosis     Bipolar affective disorder (H)     Primary insomnia     Alcohol abuse     Anxiety     Schizoaffective disorder, bipolar type (H)     Current Outpatient Medications   Medication Sig Dispense Refill     atomoxetine (STRATTERA) 18 MG capsule take 1 capsule by mouth  daily in the morning with food  1     LATUDA 80 mg Tab   2     nortriptyline (PAMELOR) 10 MG capsule Take 10 mg by mouth at bedtime.  0     omeprazole (PRILOSEC) 40 MG capsule TAKE ONE CAPSULE BY MOUTH ONE TIME DAILY 90 capsule 2     prazosin (MINIPRESS) 1 MG capsule Take 1 capsule (1 mg total) by mouth at bedtime. 30 capsule 0     QUEtiapine (SEROQUEL) 200 MG tablet Take 200 mg by mouth at bedtime. Take with 300 mg tablet for total of 500 mg at bedtime       QUEtiapine (SEROQUEL) 300 MG tablet Take 300 mg by mouth at bedtime. Take with 200 mg for total of 500 mg nightly       No current facility-administered medications for this visit.      Social History     Tobacco Use    Smoking Status Never Smoker   Smokeless Tobacco Never Used       OBJECTICE: /64   Pulse 63   Temp 97.7  F (36.5  C) (Oral)   Resp 28   Ht 6' (1.829 m)   Wt 200 lb (90.7 kg)   SpO2 95%   BMI 27.12 kg/m       No results found for this or any previous visit (from the past 24 hour(s)).     GEN-alert, in no acute distress   CV-regular rate and rhythm with no murmur   RESP-lungs clear to auscultation   ENT-neck is supple, some mild tenderness to palpation along his anterior cervical chain bilaterally, no palpable mass or lymphadenopathy.   Psychiatric-appearance is better groomed than last appointments, speech is less pressured, thought content negative for suicidal or homicidal ideation, thought processing positive for some ongoing perseveration.    Telly Arias

## 2021-06-04 NOTE — TELEPHONE ENCOUNTER
"RN Assessment/Reason for Call:   Okay to leave Detailed Message  Tripp calling in, last 10 days hallucinations, \"walking thru a tunnel\", ER 12/8/19 did tests , appt. PCP needed.  ? If needs a neurologist.  Audio hallucinations.    RN Action/Disposition:  Protocol recommends see Dr in 24 hrs.  Soonest available appt. 12/16/19  Call back if worse symptoms  Discussed home care measures.  Offered flu immunization.  Agrees to plan.     Reason for Disposition    [1] Schizophrenia AND [2] worsening (e.g., increasing voices, thinking less clearly, more agitated, less able to do activities of daily living)    Protocols used: SCHIZOPHRENIA-A-AH      "

## 2021-06-04 NOTE — TELEPHONE ENCOUNTER
New Appointment Needed  What is the reason for the visit:    Inpatient/ED Follow Up Appt Request  At what hospital or facility were you seen?: [facility]  What is the reason you were seen?: Hallucinations no dianosis.  What date were you admitted?: 12/08/2019  What date were you discharged?: 12/08/2019  What was the recommended timeframe for your follow up appointment?: ASAP  Provider Preference: PCP only  How soon do you need to be seen?: ASAP  Waitlist offered?: No  Okay to leave a detailed message:  Yes

## 2021-06-04 NOTE — TELEPHONE ENCOUNTER
"Triage call:   Not feeling well   breathing issues for the last 2 hours- patient is able to speak in full sentences and no acute distress could be assessed over the phone.   Patients speech is repetitive and rushed- kept interrupting triage nurse when asking questions.   States \"my entire brain has been tingling over my entire scalp for the last 2 weeks- that's not normal\"  Slightly dizzy   It feels like his throat is closing and that he cannot breath- again no acute distress appreciated over the phone- able to speak in full sentences.  RN had to repeat information multiple times during call and patient seemed forgetful and confused.    He reports that today is the last day he has been having auditory hallucination (sounds are too loud) and visual hallucinations- few weeks ago- scenery was distorted- hour and a half.     Triaged to be seen in the ER- patient states that he wants to wait for his dad to get home and will have his dad take him in- advised that if his dad doesn't get home within the next 5-10 minutes that he should call 911 for an ambulance. Patient agrees to this plan and will go to [facility] to be seen today.     Kerri Dean RN [facility] Triage/Med Refill 12/23/2019 1:51 PM       Reason for Disposition    Seeing or hearing or feeling things that are not there (i.e., auditory, visual, or tactile hallucinations)    Protocols used: CONFUSION - DELIRIUM-A-OH      "

## 2021-06-04 NOTE — TELEPHONE ENCOUNTER
Who is calling:  Tripp  Reason for Call:  When is he due for next Invega injection?  Please order and call patient to set up appointment to receive it.  He thinks he is due this week.  Date of last appointment with primary care: 12/26/19  Okay to leave a detailed message: Yes

## 2021-06-05 VITALS
WEIGHT: 249 LBS | BODY MASS INDEX: 33.72 KG/M2 | TEMPERATURE: 98 F | SYSTOLIC BLOOD PRESSURE: 126 MMHG | RESPIRATION RATE: 20 BRPM | OXYGEN SATURATION: 98 % | HEART RATE: 118 BPM | HEIGHT: 72 IN | DIASTOLIC BLOOD PRESSURE: 88 MMHG

## 2021-06-05 VITALS
TEMPERATURE: 98.2 F | HEIGHT: 72 IN | RESPIRATION RATE: 24 BRPM | DIASTOLIC BLOOD PRESSURE: 82 MMHG | SYSTOLIC BLOOD PRESSURE: 124 MMHG | BODY MASS INDEX: 31.02 KG/M2 | WEIGHT: 229 LBS | HEART RATE: 100 BPM

## 2021-06-05 VITALS
TEMPERATURE: 97.9 F | RESPIRATION RATE: 16 BRPM | DIASTOLIC BLOOD PRESSURE: 56 MMHG | BODY MASS INDEX: 33.46 KG/M2 | HEART RATE: 92 BPM | SYSTOLIC BLOOD PRESSURE: 90 MMHG | HEIGHT: 72 IN | WEIGHT: 247 LBS | OXYGEN SATURATION: 98 %

## 2021-06-05 VITALS
WEIGHT: 243.75 LBS | HEIGHT: 72 IN | BODY MASS INDEX: 33.01 KG/M2 | HEART RATE: 130 BPM | OXYGEN SATURATION: 98 % | RESPIRATION RATE: 20 BRPM | TEMPERATURE: 98 F | DIASTOLIC BLOOD PRESSURE: 86 MMHG | SYSTOLIC BLOOD PRESSURE: 136 MMHG

## 2021-06-05 VITALS
SYSTOLIC BLOOD PRESSURE: 106 MMHG | BODY MASS INDEX: 31.83 KG/M2 | HEART RATE: 121 BPM | HEIGHT: 72 IN | WEIGHT: 235 LBS | TEMPERATURE: 98.1 F | DIASTOLIC BLOOD PRESSURE: 76 MMHG | OXYGEN SATURATION: 98 %

## 2021-06-05 NOTE — PROGRESS NOTES
ASSESMENT AND PLAN:  Diagnoses and all orders for this visit:    Schizoaffective disorder, bipolar type (H) versus Bipolar I disorder, most recent episode (or current) manic, moderate (H) - High risk medication use  -     Prolactin  -     Glucose  -     Glycosylated Hemoglobin A1C  -     Lipid Profile  Counseling done today with the patient, he is stabilizing very well as detailed below, he will follow-up again on February 10 for his next Invega injection.  Labs above were ordered by his psychiatry clinic.    Marijuana abuse in remission  Counseling done today with the patient on the importance of continued abstinence.      Reviewed the risks and benefits of the treatment plan with the patient and/or caregiver and we discussed indications for routine and emergent follow-up.      SUBJECTIVE: 49-year-old male in for follow-up.  He continues to have excellent interval improvement.  Since last visit the patient notes that his hallucinations have resolved 100%.  The tingling sensation in his head has almost completely resolved.  He is much better able to organize his thoughts.  He has not had any relapses with marijuana use.  He did have a follow-up appointment with his psychiatry clinic and that he brings in a lab order sheet today.  He reports that his psychiatrist has cut back on his dose of Latuda and would like him to continue taking the monthly Invega injections.    No past medical history on file.  Patient Active Problem List   Diagnosis     Bipolar affective disorder (H)     Primary insomnia     Alcohol abuse     Anxiety     Schizoaffective disorder, bipolar type (H)     Marijuana abuse in remission     Current Outpatient Medications   Medication Sig Dispense Refill     lurasidone (LATUDA) 40 mg Tab tablet Take 40 mg by mouth at bedtime.       omeprazole (PRILOSEC) 40 MG capsule TAKE ONE CAPSULE BY MOUTH ONE TIME DAILY 90 capsule 2     QUEtiapine (SEROQUEL) 200 MG tablet Take 200 mg by mouth at bedtime. Take with  300 mg tablet for total of 500 mg at bedtime       QUEtiapine (SEROQUEL) 300 MG tablet Take 300 mg by mouth at bedtime. Take with 200 mg for total of 500 mg nightly       Current Facility-Administered Medications   Medication Dose Route Frequency Provider Last Rate Last Dose     paliperidone palmitate IM injection 234 mg (INVEGA SUSTENNA)  234 mg Intramuscular Q30 Days Telly Arias MD   234 mg at 01/09/20 1517     Social History     Tobacco Use   Smoking Status Never Smoker   Smokeless Tobacco Never Used       OBJECTICE: /80 (Patient Site: Left Arm)   Pulse 93   Temp 98  F (36.7  C) (Oral)   Resp 16   Ht 6' (1.829 m)   Wt 210 lb (95.3 kg)   SpO2 98%   BMI 28.48 kg/m       Recent Results (from the past 24 hour(s))   Glucose    Collection Time: 01/22/20  4:16 PM   Result Value Ref Range    Glucose 87 74 - 125 mg/dL    Patient Fasting > 8hrs? No    Glycosylated Hemoglobin A1C    Collection Time: 01/22/20  4:16 PM   Result Value Ref Range    Hemoglobin A1c 5.5 3.5 - 6.0 %        GEN-alert, appropriate, in no apparent distress   Psychiatric-appearance well-groomed, speech is less pressured, mood not depressed, affect normal, thought content negative for suicidal or homicidal ideation, thought processing negative for paranoid or delusional thinking, perseveration has improved dramatically.   CV-regular rate and rhythm with no murmur   RESP-lungs clear to auscultation       Telly Arias

## 2021-06-05 NOTE — TELEPHONE ENCOUNTER
Patient Returning Call  Reason for call:  Returning clinic call.  Information relayed to patient:  Patient informed that appointment has been made for 1/9/20 at 1420.  Patient has taken the information and states will be there for the appointment.  Patient has additional questions:  No  If YES, what are your questions/concerns:  NA  Okay to leave a detailed message?: No call back needed

## 2021-06-05 NOTE — TELEPHONE ENCOUNTER
Patient should get the injection this Thursday afternoon at his appointment with me.  Please schedule him for an appointment.  There are no refugee screenings this week so my Thursday afternoon appointments can be opened.  Thank you.

## 2021-06-06 NOTE — TELEPHONE ENCOUNTER
Pt called back and is unable to make tomorrow at 540 pm.  Pt states he would like to come Thursday afternoon.     PCP has a pre-op and physical and then everything is full.  What does PCP think?  Thanks.

## 2021-06-06 NOTE — TELEPHONE ENCOUNTER
Dr. Arias would you like to squeeze this patient in sooner then whats available? Please see previous message.

## 2021-06-06 NOTE — TELEPHONE ENCOUNTER
New Appointment Needed  What is the reason for the visit:    Same Date/Next Day Appt Request  What is the reason for your visit?: Patient wants to see Dr. Arias earlier than what is available.  Patient needs Invega injection and wants to discuss his lab results.      Provider Preference: PCP only  How soon do you need to be seen?:   ASAP  Waitlist offered?: Yes  Okay to leave a detailed message:  Yes

## 2021-06-06 NOTE — PROGRESS NOTES
ISMAEL Sanders is a 49 y.o. male here for his Invega shot. This will be his 3rd shot. He thinks it is helping his mood and also, his hallucinations all gone. He is feeling somewhat anxious and depressed about the current COVID-19 outbreak.     He is still taking Seroquel 500 mg at bedtime but no longer taking Latuda.   No past medical history on file.  Current Outpatient Medications on File Prior to Visit   Medication Sig Dispense Refill     omeprazole (PRILOSEC) 40 MG capsule TAKE ONE CAPSULE BY MOUTH ONE TIME DAILY 90 capsule 2     QUEtiapine (SEROQUEL) 200 MG tablet Take 200 mg by mouth at bedtime. Take with 300 mg tablet for total of 500 mg at bedtime       QUEtiapine (SEROQUEL) 300 MG tablet Take 300 mg by mouth at bedtime. Take with 200 mg for total of 500 mg nightly       Current Facility-Administered Medications on File Prior to Visit   Medication Dose Route Frequency Provider Last Rate Last Dose     paliperidone palmitate IM injection 234 mg (INVEGA SUSTENNA)  234 mg Intramuscular Q30 Days Telly Arisa MD   234 mg at 03/18/20 1623     ?  O  /76   Pulse 90   Temp 98  F (36.7  C) (Oral)   Resp 16   Ht 6' (1.829 m)   Wt 222 lb (100.7 kg)   SpO2 98% Comment: ra  BMI 30.11 kg/m     Vitals reviewed. Nursing note reviewed.  General Appearance: Pleasant and alert  HEENT: mucous membranes moist  Skin: warm, dry, intact, no rash noted  Neuro: no focal deficits, CNs II-XII normal.   Psych:     A/P  Tripp was seen today for injections.    Diagnoses and all orders for this visit:    Schizoaffective disorder, bipolar type (H): continue Invega, which seems to be working very well for him. Follow up with Dr. Arias in 1 month. Will defer to Dr. Arias as to whether or not he would like to see Tripp in person vs just doing a nurse visit for the shot.    -We discussed the covid-19 pandemic and social distancing and I tried to provide as much reassurance as possible.     Depression  -     PHQ9 Depression  Screen    Need for immunization against influenza  -     Influenza, Recombinant, Inj, Quadrivalent, PF, 18+YRS    Bipolar disorder in partial remission, most recent episode unspecified type (H)         No follow-ups on file.    Options for treatment and follow-up care were reviewed with the patient and/or guardian. Tripp Sanders and/or guardian engaged in the decision making process and verbalized understanding of the options discussed and agreed with the final plan.    Jami Pugh MD

## 2021-06-07 NOTE — PROGRESS NOTES
Tripp Sanders is a 49 y.o. male here for invega injection.     ASSESSMENT/PLAN:   Tripp was seen today for injections and insomnia.    Diagnoses and all orders for this visit:    Schizoaffective disorder, bipolar type (H)  Due for invega injection. Thinks the invega is helping. Denies any auditory/visual hallucinations. Would like to continue with it.         - Follow up 1 month for injection.     Insomnia, unspecified type  Previously on nortriptyline 10mg, discontinued in January because it was no longer working. Has been unable to sleep for the past 4 days and would like to go back on it, consider possiblity that his invega injection was due 3-4  days ago. Chart reviewed and no contraindications found. Will prescribe 1 month, discussed possible side effects and to call if any concerns.   -     nortriptyline (PAMELOR) 25 MG capsule; Take 1 capsule (25 mg total) by mouth at bedtime.  - Can refill if no side effects and if working for sleep.       Return in about 1 month (around 5/21/2020) for invega injection.       ======================================================    SUBJECTIVE  Tripp Sanders is a 49 y.o. male here for invega injection.     This will be his 4th injection. Good results on it, does not feel like his bipolar disorder is under complete control but unable to articulate why he feels that way. Should have received his injection a few days ago but rescheduled a few times.     Has gotten no sleep for 4 days, would like to go back on nortriptyline if possible. Was on 10mg at bedtime for a while until it stopped working, discontinued in January 2020. No documented side effects for discontinuation. Tried amitriptyline but had nightmares on it.     ROS  Complete 10 point review of systems negative except as noted above in HPI    Reviewed Past Medical History, Medications, Family History and Social History in Epic and up to date with no new changes.    OBJECTIVE  /64   Pulse 98   Temp 97.8  F  (36.6  C) (Oral)   Resp 16   Ht 6' (1.829 m)   Wt 216 lb (98 kg)   SpO2 98%   BMI 29.29 kg/m       General: Cooperative, pleasant, in no acute distress  Neck: no lymphadenopathy, no masses  CV: RRR, normal S1/S2, no murmur, rubs, gallops  Resp: No respiratory distress. Clear to auscultation bilaterally. No wheezes, rales, rhonchi  Psych: No suicidal or homicidal ideations, no self-harm.  Denies visual and auditory hallucinations. Slightly pressured speech. Some tangential thinking but able to easily redirect.     LABS & IMAGES   Results for orders placed or performed in visit on 01/22/20   Prolactin   Result Value Ref Range    Prolactin 66.4 (H) 0.0 - 15.0 ng/mL   Glucose   Result Value Ref Range    Glucose 87 74 - 125 mg/dL    Patient Fasting > 8hrs? No    Glycosylated Hemoglobin A1C   Result Value Ref Range    Hemoglobin A1c 5.5 3.5 - 6.0 %   Lipid Profile   Result Value Ref Range    Triglycerides 405 (H) <=149 mg/dL    Cholesterol 217 (H) <=199 mg/dL    LDL Calculated      HDL Cholesterol 43 >=40 mg/dL    Patient Fasting > 8hrs? No          ======================================================      Options for treatment and follow-up care were reviewed with the patient. Tripp Sanders and/or guardian was engaged and actively involved in the decision making process. Tripp Sanders and/or guardian verbalized understanding of the options discussed and was satisfied with the final plan.      Penny Arana MD

## 2021-06-07 NOTE — TELEPHONE ENCOUNTER
New Appointment Needed  What is the reason for the visit:    Patient needs an Invega Shot for Bipolar scheduled in April on the 20th he has received them every month for past few months at [place]  Provider Preference: [place]  How soon do you need to be seen?: April 20th  Waitlist offered?: N/A  Okay to leave a detailed message:  Yes

## 2021-06-07 NOTE — TELEPHONE ENCOUNTER
Patient Returning Call  Reason for call:  Return call   Information relayed to patient:  Caller was informed of message that patient is still schedule the same date and time but  With a different doctor. Dr. Arana   Patient has additional questions:  No  If YES, what are your questions/concerns:    Okay to leave a detailed message?: No call back needed

## 2021-06-07 NOTE — TELEPHONE ENCOUNTER
Very important that the patient be getting his Invega shot every month, should be approximately every 30 days and should not go beyond that.  Since he has to come in for the shot anyway, I think it is a good idea for him to have a clinic appointment along with the injection.

## 2021-06-08 NOTE — PROGRESS NOTES
Patient has been stable, see previous notes for details, patient was scheduled for an office visit but preferred to have a nurse only visit for his injection today.  Invega injection given today and he will follow-up again in 1 month for his next injection.  Refill of his nortriptyline done.  We will plan for nurse only visit again in 1 month unless there are new issues or problems, then a clinic visit in person with me again in 2 months.

## 2021-06-09 NOTE — TELEPHONE ENCOUNTER
New Appointment Needed  What is the reason for the visit:    Invega shot     How soon do you need to be seen?: 07/27 late afternoon if possible. Patient is only wanting to come in on Monday and requested I send a message to see if any nurse could see him for this coming that afternoon. He advised he needs one every month and didn't want to go anywhere else.  Waitlist offered?: No  Okay to leave a detailed message:  Yes

## 2021-06-09 NOTE — PROGRESS NOTES
"ASSESMENT AND PLAN:  Diagnoses and all orders for this visit:    Fever  -     Rapid Strep A Screen-Throat, positive.  -     Influenza A/B Rapid Test  Negative influenza A/B.  Supportive measures.  Push with fluids.    Strep throat  -     azithromycin (ZITHROMAX) 250 MG tablet; Take 500 mg (2 x 250 mg tablets) on day 1 followed by 250 mg (1 tablet) on days 2-5.  Dispense: 6 tablet; Refill: 0  Pt declined Penicillin or amoxicillin. Preferred Zithromax.  RTC if no improvement in the next several days as expected.        SUBJECTIVE: Tripp Sanders is here with subjective fever, chills, cough and body ache for 4 days. Father was tested positive with strep recently. No nausea or vomiting. No diarrhea bu has some abdominal pain. No rashes.  Hasn't tried any OTC meds.    No past medical history on file.  Patient Active Problem List   Diagnosis     Incomplete Emptying Of Bladder     Insomnia     Bipolar affective disorder       Allergies:    Allergies   Allergen Reactions     Trazodone      priapism       History   Smoking Status     Never Smoker   Smokeless Tobacco     Never Used       Review of systems otherwise negative except as listed in HPI.   History   Smoking Status     Never Smoker   Smokeless Tobacco     Never Used       OBJECTICE: /86 (Patient Site: Right Arm, Patient Position: Sitting, Cuff Size: Adult Regular)  Pulse (!) 116  Temp 98.1  F (36.7  C) (Oral)   Ht 6' 0.5\" (1.842 m)  Wt 217 lb 9.6 oz (98.7 kg)  SpO2 96%  BMI 29.11 kg/m2        GEN-alert,  in no apparent distress.  HEENT-mucous membranes are moist, neck is supple.  CV-regular rate and rhythm with no murmur.   RESP-lungs clear to auscultation .  ABDOMEN- Soft , not tender.  SKIN-normal        Sheffield Kamla   3/23/2017     "

## 2021-06-10 NOTE — TELEPHONE ENCOUNTER
Left voice message requesting call back to clinic at [phone] to do travel screen via phone.     Please confirm  and appointment time when documenting travel screen responses.

## 2021-06-10 NOTE — TELEPHONE ENCOUNTER
Left voice message requesting return call to [facility] clinic at [phone] to reschedule 8/26/20 [facility] appointment.    He should receive injection every 30 days.  Last dose was administered 7/29/20.    Travel screen also needed to be done pre-appointment.    Thank you.

## 2021-06-12 NOTE — PROGRESS NOTES
Assessment:      Healthy male exam.      1. Routine general medical examination at a health care facility     2. Bipolar affective disorder     3. High risk medication use  Lipid Cascade FASTING    Glucose    Glycosylated Hemoglobin A1c    Prolactin    Glucose          Plan:       Fax labs to [facility] [phone]     Subjective:      Tripp Sanders is a 47 y.o. male who presents for an annual exam. The patient reports that there is not domestic violence in his life.     No polyuria.    Healthy Habits:   Regular Exercise: No  Sunscreen Use: No  Healthy Diet: No  Dental Visits Regularly: Yes  Seat Belt: Yes  Sexually active: No  Monthly Self Testicular Exams:  Yes  Hemoccults: No  Flex Sig: No  Colonoscopy: No  Lipid Profile: Yes  Glucose Screen: Yes  Prevention of Osteoporosis: Yes  Last Dexa: N/A  Guns at Home:  No      Immunization History   Administered Date(s) Administered     Hep B, Adult 03/08/2016     MMR 03/27/2008     Td, historic 1968     Tdap 07/23/2015     Immunization status: up to date and documented, No Flu vaccine..    No exam data present    Current Outpatient Prescriptions   Medication Sig Dispense Refill     LATUDA 80 mg Tab   2     lurasidone (LATUDA) 40 mg Tab tablet Take 40 mg by mouth daily.       mirtazapine (REMERON) 45 MG tablet Take 45 mg by mouth bedtime.       omeprazole (PRILOSEC) 40 MG capsule Take 1 capsule (40 mg total) by mouth daily. 90 capsule 3     QUEtiapine (SEROQUEL XR) 400 MG 24 hr tablet   5     QUEtiapine (SEROQUEL) 300 MG tablet Take 300 mg by mouth bedtime.       No current facility-administered medications for this visit.      No past medical history on file.  No past surgical history on file.  Trazodone  No family history on file.  Social History     Social History     Marital status: Single     Spouse name: N/A     Number of children: N/A     Years of education: N/A     Occupational History     Not on file.     Social History Main Topics     Smoking status:  "Never Smoker     Smokeless tobacco: Never Used     Alcohol use No     Drug use: No     Sexual activity: Not on file     Other Topics Concern     Not on file     Social History Narrative       Review of Systems  General:  Denies problem  Eyes: Denies problem  Ears/Nose/Throat: Denies problem  Cardiovascular: Denies problem  Respiratory:  Denies problem  Gastrointestinal:  Denies problem  Genitourinary: Denies problem  Musculoskeletal:  Denies problem  Skin: Denies problem  Neurologic: Denies problem  Psychiatric: Denies problem  Endocrine: Denies problem  Heme/Lymphatic: Denies problem   Allergic/Immunologic: Denies problem        Objective:     Vitals:    09/12/17 0948   BP: 118/86   Pulse: 98   Resp: 20   Temp: 100  F (37.8  C)   SpO2: 98%   Weight: (!) 225 lb (102.1 kg)   Height: 6' 0.01\" (1.829 m)     Body mass index is 30.51 kg/(m^2).    Physical  General Appearance: Alert, cooperative, no distress, appears stated age  Head: Normocephalic, without obvious abnormality, atraumatic  Eyes: PERRL, conjunctiva/corneas clear, EOM's intact  Ears: Normal TM's and external ear canals, both ears  Nose: Nares normal, septum midline,mucosa normal, no drainage  Throat: Lips, mucosa, and tongue normal; teeth and gums normal  Neck: Supple, symmetrical, trachea midline, no adenopathy;  thyroid: not enlarged, symmetric, no tenderness/mass/nodules; no carotid bruit or JVD  Back: Symmetric, no curvature, ROM normal, no CVA tenderness  Lungs: Clear to auscultation bilaterally, respirations unlabored  Heart: Regular rate and rhythm, S1 and S2 normal, no murmur, rub, or gallop,  Abdomen: Soft, non-tender, bowel sounds active all four quadrants,  no masses, no organomegaly  Genitourinary: Penis normal, circumcised. Right testis is descended. Left testis is descended, atrophic. Pt declined prostate exam.  Musculoskeletal: Normal range of motion. No joint swelling or deformity.   Extremities: Extremities normal, atraumatic, no cyanosis " or edema  Skin: Skin color, texture, turgor normal, no rashes or lesions  Lymph nodes: Cervical, supraclavicular, and axillary nodes normal  Neurologic: He is alert. He has normal reflexes.   Psychiatric: He has a normal mood and affect.

## 2021-06-13 NOTE — PROGRESS NOTES
Clinic Care Coordination Contact  [facility]/Voicemail       Clinical Data: Care Coordinator Outreach  Outreach attempted x 1.  Left message on patient's voicemail with call back information and requested return call. Plan: will try to reach patient again in 1-2 business days.

## 2021-06-13 NOTE — PROGRESS NOTES
ASSESMENT AND PLAN:  Diagnoses and all orders for this visit:    Schizoaffective disorder, bipolar type (H)  Stable.  Dramatically improved control with Invega along with his other medications.  He gets the Invega here and then follows up for his other medications at his psychiatry clinic.  Counseling done today with the patient in detail on the plan.  Reassured him that his billing does not appear to be accurate and that I am going to ask our financial  on our care management team to reach out to him as soon as possible to fix this.  See additional information below.  Continue to follow-up monthly here for Invega injections and will come see me again in 6 months, continue regular follow-up with his psychiatrist.  Patient counseled extensively on the risks of the marijuana use exacerbating his underlying mental illness and he was encouraged to quit or scale back.  -     Ambulatory referral to Care Management (Primary Care)        Reviewed the risks and benefits of the treatment plan with the patient and/or caregiver and we discussed indications for routine and emergent follow-up.        SUBJECTIVE: 50-year-old male with complex mental health history with recent exacerbation of severe schizoaffective disorder.  See previous notes for details.  This is, under good control with his current combination of medications.  He takes oral medications prescribed by his psychiatrist and then gets his monthly Invega injections here-his psychiatrist is aware of the Invega injections and is supportive of that portion of his therapy as well.  Patient reports that he has had complete resolution of his hallucinations and the sensations in his head that he was getting.  He feels like things are going very well for him.  He has been using marijuana 2-3 times per week.  He has been worrying a lot about repeated bills that he has been getting in the mail.  He brings him in today and they have the following details.  Patient  reports that he should not be getting bills because he has triple coverage with Medicaid, Medicare, and [facility].    Bill information: There is a statement dated 11/25/2020 indicating that he owes $1191.38.  These are from professional service charges of $324 on 12/10/2019, $349 on 5/26/2020, $349 on 6/25/2020, and $169.38 on 7/29/2020.  It looks like insurance did not cover any portion of those visits.  Patient is feeling very stressed because he was told that he would be sent to collections in 5 days.    Patient reports that his medical assistance number is [id number] and that his Medicare number is [id number] and that his  care ID number is [id number]    No past medical history on file.  Patient Active Problem List   Diagnosis     Bipolar affective disorder (H)     Primary insomnia     Alcohol abuse     Anxiety     Schizoaffective disorder, bipolar type (H)     Marijuana abuse in remission     Current Outpatient Medications   Medication Sig Dispense Refill     nortriptyline (PAMELOR) 25 MG capsule Take 1 capsule (25 mg total) by mouth at bedtime. 30 capsule 3     omeprazole (PRILOSEC) 40 MG capsule TAKE ONE CAPSULE BY MOUTH ONE TIME DAILY 90 capsule 3     QUEtiapine (SEROQUEL) 200 MG tablet Take 200 mg by mouth at bedtime. Take with 300 mg tablet for total of 500 mg at bedtime       QUEtiapine (SEROQUEL) 300 MG tablet Take 300 mg by mouth at bedtime. Take with 200 mg for total of 500 mg nightly       lamoTRIgine (LAMICTAL) 100 MG tablet Take 100 mg by mouth daily.       Current Facility-Administered Medications   Medication Dose Route Frequency Provider Last Rate Last Admin     paliperidone palmitate IM injection 234 mg (INVEGA SUSTENNA)  234 mg Intramuscular Q30 Days Telly Arias MD   234 mg at 12/02/20 1034     Social History     Tobacco Use   Smoking Status Never Smoker   Smokeless Tobacco Never Used   Tobacco Comment    no passive exposure       OBJECTICE: /82 (Patient Site: Left Arm, Patient  Position: Sitting, Cuff Size: Adult Regular)   Pulse 100   Temp 98.2  F (36.8  C)   Resp 24   Ht 6' (1.829 m)   Wt (!) 229 lb (103.9 kg) Comment: with sneakers  BMI 31.06 kg/m       No results found for this or any previous visit (from the past 24 hour(s)).     GEN-alert, appropriate, in no apparent distress   CV-regular rate and rhythm with no murmur   RESP-lungs clear to auscultation   Psychiatric-appearance is well-groomed, speech is mildly pressured, mood is not depressed, affect is normal, thought content negative for suicidal or homicidal ideation.    Telly Arias

## 2021-06-13 NOTE — PROGRESS NOTES
Clinic Care Coordination Contact  Community Health Worker Initial Outreach    CHW Initial Information Gathering:  Referral Source: PCP  Preferred Hospital: [facility]  [phone]  Preferred Urgent Care: [facility], [phone]  Current living arrangement:: I live in a private home with family  Type of residence:: Private home - stairs  Community Resources: None  Supplies Currently Used at Home: None  Equipment Currently Used at Home: none  Informal Support system:: None  No PCP office visit in Past Year: No  Transportation means:: Accessible car, Public transportation  CHW Additional Questions  If ED/Hospital discharge, follow-up appointment scheduled as recommended?: N/A  MyChart active?: No  Patient agreeable to assistance with activating MyChart?: No    Patient accepts CC: Yes. Patient scheduled for assessment with CCC NITA on 12/30/2020 at 2:00 PM. Patient noted desire to discuss medical bills and insurance issues.

## 2021-06-14 NOTE — PROGRESS NOTES
"Clinic Care Coordination Contact     Spoke with pt briefly by phone this afternoon. He was initially quite upset by my call and made several statements indicating a fairly high level of paranoia. Repeatedly (x6) made the statement \"I put all my trust in Dr. Telly Arias and I do not need a  getting involved.\" Also asserted (x3) \"I know you social workers think you can get involved just because someone has a mental illness.\"     I checked his account balance as he indicated that he has received multiple medical bills from [facility] over the past several months. His account balance is showing as $0 at this time, which I did inform him of. He reports that he is not going to pay the bills he received because his psychiatrist has advised him not to. He also reports that he will call the clinic and \"talk to Dr. Telly Arias directly\" if he receives another bill. He reiterated that he does not want anyone else involved with his care. Unclear what pt's baseline is (as this was the first and only time that I have ever spoken with him), but I was unable to have any kind of substantial/meaningful conversation with him today. Furthermore, he refused to complete an assesssment.     Order will be removed from WQ and no further outreach by CCC team will be attempted at this time.  "

## 2021-06-14 NOTE — PROGRESS NOTES
ASSESMENT AND PLAN:  Diagnoses and all orders for this visit:    Chest pain, unspecified type  -     Electrocardiogram Perform and Read done today in clinic and reviewed by me personally shows a mild sinus tachycardia but is otherwise normal.  Counseled the patient on this today.  I think his chest pain is most likely musculoskeletal with the possibility that is related to reflux and esophageal spasm.  He was counseled on the importance of taking his omeprazole every day and counseled on indications for routine and emergent follow-up.    Schizoaffective disorder, bipolar type (H)  Stable.  Continue monthly Invega injections.          Reviewed the risks and benefits of the treatment plan with the patient and/or caregiver and we discussed indications for routine and emergent follow-up.        SUBJECTIVE: 50-year-old male has been getting chest pain on and off for 30 years.  However, it has been a little more noticeable to him over this past week.  Last week he had an episode where he got a sharp pain that lasted for a few minutes which was followed by an ache that lasted for a few hours.  He has had similar pain in the past, on and off, the current episodes and past episodes have not been correlated with physical exertion.  The chest pain is mild to moderate, it has not been affiliated with any shortness of breath or near syncope or palpitations.  Patient has been sober from alcohol for over a year.  He does still use marijuana, currently about 3 times per week and has been working on [employer] back.  He has a history of severe schizoaffective disorder, see previous notes for details, but this has stabilized with Invega and he is up-to-date on his most recent injection.    No past medical history on file.  Patient Active Problem List   Diagnosis     Bipolar affective disorder (H)     Primary insomnia     Alcohol abuse     Anxiety     Schizoaffective disorder, bipolar type (H)     Marijuana abuse in remission     Current  Outpatient Medications   Medication Sig Dispense Refill     lamoTRIgine (LAMICTAL) 100 MG tablet Take 100 mg by mouth daily.       nortriptyline (PAMELOR) 25 MG capsule Take 1 capsule (25 mg total) by mouth at bedtime. 30 capsule 3     omeprazole (PRILOSEC) 40 MG capsule TAKE ONE CAPSULE BY MOUTH ONE TIME DAILY 90 capsule 3     QUEtiapine (SEROQUEL) 200 MG tablet Take 200 mg by mouth at bedtime. Take with 300 mg tablet for total of 500 mg at bedtime       QUEtiapine (SEROQUEL) 300 MG tablet Take 300 mg by mouth at bedtime. Take with 200 mg for total of 500 mg nightly       Current Facility-Administered Medications   Medication Dose Route Frequency Provider Last Rate Last Admin     paliperidone palmitate IM injection 234 mg (INVEGA SUSTENNA)  234 mg Intramuscular Q30 Days Telly Arias MD   234 mg at 01/04/21 1114     Social History     Tobacco Use   Smoking Status Never Smoker   Smokeless Tobacco Never Used   Tobacco Comment    no passive exposure       OBJECTICE: /76 (Patient Site: Left Arm)   Pulse (!) 121   Temp 98.1  F (36.7  C) (Oral)   Ht 6' (1.829 m)   Wt (!) 235 lb (106.6 kg)   SpO2 98%   BMI 31.87 kg/m       Recent Results (from the past 24 hour(s))   Electrocardiogram Perform and Read    Collection Time: 01/06/21 12:05 PM   Result Value Ref Range    SYSTOLIC BLOOD PRESSURE      DIASTOLIC BLOOD PRESSURE      VENTRICULAR RATE 110 BPM    ATRIAL RATE 110 BPM    P-R INTERVAL 140 ms    QRS DURATION 92 ms    Q-T INTERVAL 340 ms    QTC CALCULATION (BEZET) 460 ms    P Axis 60 degrees    R AXIS 12 degrees    T AXIS 64 degrees    MUSE DIAGNOSIS       Sinus tachycardia  Otherwise normal ECG  When compared with ECG of 23-DEC-2019 15:03,  Premature ventricular complexes are no longer Present          GEN-alert, appropriate, in no apparent distress   CV-regular tachycardia with no murmur   RESP-lungs clear to auscultation   EXTREM-warm with no pitting edema and no calf tenderness       Telly Arias

## 2021-06-15 NOTE — PROGRESS NOTES
Assessment: /    Plan:    1. Primary insomnia  amitriptyline (ELAVIL) 25 MG tablet       Begin amitriptyline 25 mg at bedtime.  Discussed potential side effects.  Call or recheck if any problems.  He declined influenza vaccine.      Subjective:    HPI:  Tripp Sanders is a 47-year-old male presenting with insomnia.  Benadryl has not helped.  He states that when he took trazodone, he had priapism.  He states that he has not been in a manic phase of his bipolar disorder.      Review of Systems: No fever or cough.      Current Outpatient Prescriptions   Medication Sig Dispense Refill     divalproex (DEPAKOTE) 250 MG 24 hr tablet   0     QUEtiapine (SEROQUEL XR) 400 MG 24 hr tablet   5     amitriptyline (ELAVIL) 25 MG tablet Take 1-2 tablets at bedtime as needed for sleep 60 tablet 11     divalproex (DEPAKOTE) 500 MG 24 hr tablet   2     LATUDA 80 mg Tab   2     lurasidone (LATUDA) 40 mg Tab tablet Take 40 mg by mouth daily.       mirtazapine (REMERON) 30 MG tablet   1     mirtazapine (REMERON) 45 MG tablet Take 45 mg by mouth bedtime.       OLANZapine (ZYPREXA) 2.5 MG tablet   0     omeprazole (PRILOSEC) 40 MG capsule Take 1 capsule (40 mg total) by mouth daily. 90 capsule 3     QUEtiapine (SEROQUEL) 300 MG tablet Take 300 mg by mouth bedtime.       risperiDONE (RISPERDAL) 2 MG tablet   1     No current facility-administered medications for this visit.          Objective:    Vitals:    12/29/17 1441   BP: 100/80   Pulse: 100   Resp: 20   Temp: 97.8  F (36.6  C)       Gen:  NAD, VSS  Lungs:  normal  Heart:  normal  Speech is rapid, with normal content.        ADDITIONAL HISTORY SUMMARIZED (2): None.  DECISION TO OBTAIN EXTRA INFORMATION (1): None.   RADIOLOGY TESTS (1): None.  LABS (1): None.  MEDICINE TESTS (1): None.  INDEPENDENT REVIEW (2 each): None.     Total Data Points: 0

## 2021-06-15 NOTE — PROGRESS NOTES
ASSESMENT AND PLAN:  Diagnoses and all orders for this visit:    Sinus tachycardia  Reviewed his recent EKG results with the patient.  Counseled on sinus tachycardia.  Given its sustained nature, his age, other medical risk factors, and borderline elevated blood pressure, and symptomatology detailed below we did decide to start metoprolol.  -     metoprolol succinate (TOPROL XL) 50 MG 24 hr tablet; Take 1 tablet (50 mg total) by mouth daily.  Dispense: 30 tablet; Refill: 11    Schizoaffective disorder, bipolar type (H)  Stable.  He is getting good results with the monthly Invega injection.  Counseling done today on complete cessation of marijuana.  He will come back for his next injection in March and will recheck on the blood pressure and tachycardia and new medication at that time.    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  Observation.        Reviewed the risks and benefits of the treatment plan with the patient and/or caregiver and we discussed indications for routine and emergent follow-up.        SUBJECTIVE: 50-year-old male has chronic schizoaffective disorder, gets monthly Invega injection and has been doing very well with that.  He continues to struggle with cessation of marijuana use.  He reports that he has a bad influence in his life, a friend of his dad's comes over several times per week and smokes marijuana and this leads to the patient using marijuana as well.  He estimates he has been using 2-3 times per week.  He has not had any recurrence of hallucinations or the other concerning symptoms detailed in the previous notes.  Patient reports that he has for many years always had a mildly elevated pulse.  Today his initial pulse reading was 130, did improve to the 1 teens on recheck.  He also has borderline elevated blood pressure.   Patient reports that he occasionally gets a palpating sensation in his chest.  No chest pain or shortness of breath.  Patient previously had been getting some chest pain but those symptoms have resolved completely and he has not experienced them in the last few weeks.  Patient reports he gets some ringing sensation in both ears intermittently.  He is wondering if his ears might be clogged with wax.  He did work in a setting with loud machinery for 9 years, he usually used to ear protection during that time.    No past medical history on file.  Patient Active Problem List   Diagnosis     Bipolar affective disorder (H)     Primary insomnia     Alcohol abuse     Anxiety     Schizoaffective disorder, bipolar type (H)     Marijuana abuse in remission     Current Outpatient Medications   Medication Sig Dispense Refill     lamoTRIgine (LAMICTAL) 100 MG tablet Take 100 mg by mouth daily.       metoprolol succinate (TOPROL XL) 50 MG 24 hr tablet Take 1 tablet (50 mg total) by mouth daily. 30 tablet 11     nortriptyline (PAMELOR) 25 MG capsule Take 1 capsule (25 mg total) by mouth at bedtime. 30 capsule 3     omeprazole (PRILOSEC) 40 MG capsule TAKE ONE CAPSULE BY MOUTH ONE TIME DAILY 90 capsule 3     QUEtiapine (SEROQUEL) 200 MG tablet Take 200 mg by mouth at bedtime. Take with 300 mg tablet for total of 500 mg at bedtime       QUEtiapine (SEROQUEL) 300 MG tablet Take 300 mg by mouth at bedtime. Take with 200 mg for total of 500 mg nightly       Current Facility-Administered Medications   Medication Dose Route Frequency Provider Last Rate Last Admin     paliperidone palmitate IM injection 234 mg (INVEGA SUSTENNA)  234 mg Intramuscular Q30 Days Telly Arias MD   234 mg at 02/09/21 1049     Social History     Tobacco Use   Smoking Status Never Smoker   Smokeless Tobacco Never Used   Tobacco Comment    no passive exposure       OBJECTICE: /86   Pulse (!) 130   Temp 98  F (36.7  C) (Oral)   Resp 20   Ht 6' (1.829 m)    Wt (!) 243 lb 12 oz (110.6 kg)   SpO2 98%   BMI 33.06 kg/m       No results found for this or any previous visit (from the past 24 hour(s)).     CV-regular tachycardia with no murmur, on recheck his pulse improved to the 110s    RESP-lungs clear to auscultation   Psychiatric-appearance is well-groomed, speech is mildly pressured, some perseveration, thought content negative for suicidal or homicidal ideation, thought processing negative for paranoid or delusional thinking.    Telly Arias

## 2021-06-16 PROBLEM — R00.0 SINUS TACHYCARDIA: Status: ACTIVE | Noted: 2021-03-16

## 2021-06-16 PROBLEM — F25.0 SCHIZOAFFECTIVE DISORDER, BIPOLAR TYPE (H): Status: ACTIVE | Noted: 2019-12-10

## 2021-06-16 PROBLEM — F12.11 MARIJUANA ABUSE IN REMISSION: Status: ACTIVE | Noted: 2020-01-09

## 2021-06-16 PROBLEM — F51.01 PRIMARY INSOMNIA: Status: ACTIVE | Noted: 2017-12-29

## 2021-06-16 NOTE — PROGRESS NOTES
ASSESMENT AND PLAN:  Diagnoses and all orders for this visit:    Schizoaffective disorder, bipolar type (H)  Stable.  Counseling done with the patient.  Invega injection given.  Follow-up again in 1 month.    Sinus tachycardia  Improved with metoprolol, which the patient is tolerating well, normal heart rate today.  Continue metoprolol.    Gross hematuria  Counseled the patient extensively today on the differential diagnosis.  He refuses a prostate exam.  We talked about the potential for needing CT scanning and cystoscopy.  He is not sure if he wants to proceed with this.  He would like to start with labs as ordered below and then I will be in contact him with the results to determine next steps.  He follows up monthly for the above follow-up.  -     Urinalysis-UC if Indicated  -     Chlamydia trachomatis & Neisseria gonorrhoeae, Amplified Detection  -     PSA (Prostatic-Specific Antigen), Diagnostic    Seasonal allergic rhinitis, unspecified trigger  Counseled the patient that Flonase would be the best treatment but he does not like the idea of spraying something up into his nose.  Antihistamines have not been effective.  Counseled patient that he can get over-the-counter allergy eyedrops that would help reduce the eye symptoms but would not help with his nasal symptoms.  I recommended he consider Flonase, he is going to think about that.      Reviewed the risks and benefits of the treatment plan with the patient and/or caregiver and we discussed indications for routine and emergent follow-up.        SUBJECTIVE: 50-year-old male has complex mental illness.  Has been very well controlled with his current regiment which is comanaged with psychiatry.  He gets his monthly Invega injection here.  Patient reports that since I last saw him, he continues to work on reducing his marijuana use, he continues to use it occasionally.  He is using it less often than he had in the past.  He is sleeping well most nights.  He has  not had return of the psychosis symptoms that he had in the past.  No recent hallucinations.  Patient has a new concern about some red blood in the underwear that he noticed.  He thinks it happened after urination.  He has not been sexually active recently.  He has not been seen blood in the toilet after urination.  It happened on one occasion and then resolved.  Patient reports that it happened 2 years ago on one occasion as well and then resolved and he did have some work-up at that time.  Patient is not having burning with urination, no fevers or chills, no flank pain.    No past medical history on file.  Patient Active Problem List   Diagnosis     Bipolar affective disorder (H)     Primary insomnia     Alcohol abuse     Anxiety     Schizoaffective disorder, bipolar type (H)     Marijuana abuse in remission     Sinus tachycardia     Current Outpatient Medications   Medication Sig Dispense Refill     divalproex (DEPAKOTE ER) 500 MG 24 hour tablet Take 500 mg by mouth at bedtime.       metoprolol succinate (TOPROL XL) 50 MG 24 hr tablet Take 1 tablet (50 mg total) by mouth daily. 30 tablet 11     omeprazole (PRILOSEC) 40 MG capsule TAKE ONE CAPSULE BY MOUTH ONE TIME DAILY  90 capsule 3     QUEtiapine (SEROQUEL) 200 MG tablet Take 200 mg by mouth at bedtime. Take with 300 mg tablet for total of 500 mg at bedtime       QUEtiapine (SEROQUEL) 300 MG tablet Take 300 mg by mouth at bedtime. Take with 200 mg for total of 500 mg nightly       Current Facility-Administered Medications   Medication Dose Route Frequency Provider Last Rate Last Admin     paliperidone palmitate IM injection 234 mg (INVEGA SUSTENNA)  234 mg Intramuscular Q30 Days Telly Arias MD   234 mg at 04/20/21 1120     Social History     Tobacco Use   Smoking Status Never Smoker   Smokeless Tobacco Never Used   Tobacco Comment    no passive exposure       OBJECTICE: BP 90/56 (Patient Site: Left Arm)   Pulse 92   Temp 97.9  F (36.6  C) (Oral)   Resp  16   Ht 6' (1.829 m)   Wt (!) 247 lb (112 kg)   SpO2 98%   BMI 33.50 kg/m       No results found for this or any previous visit (from the past 24 hour(s)).     Psychiatric-no change from his recent baseline.  Mood is not depressed, affect is slightly elevated, speech is slightly pressured, thought processing slightly tangential.  No suicidal or homicidal ideation.  No paranoid or delusional thinking.   CV-regular rate and rhythm, tachycardia has resolved.   RESP-lungs clear to auscultation   ABDOMINAL-soft and nontender with no palpable mass   Genitourinary-normal testicular exam bilaterally, normal external genitalia.   Rectal exam/prostate exam-refused by patient.    Telly Arias

## 2021-06-16 NOTE — TELEPHONE ENCOUNTER
Refill Approved    Rx renewed per Medication Renewal Policy. Medication was last renewed on 3/26/20.    Nicola Mcneal, [facility] Triage/Med Refill 4/5/2021     Requested Prescriptions   Pending Prescriptions Disp Refills     omeprazole (PRILOSEC) 40 MG capsule [Pharmacy Med Name: Omeprazole Oral Capsule Delayed Release 40 MG] 90 capsule 0     Sig: TAKE ONE CAPSULE BY MOUTH ONE TIME DAILY       GI Medications Refill Protocol Passed - 4/3/2021  1:50 PM        Passed - PCP or prescribing provider visit in last 12 or next 3 months.     Last office visit with prescriber/PCP: 3/16/2021 Telly Arias MD OR same dept: 3/16/2021 Telly Arias MD OR same specialty: 3/16/2021 Telly Arias MD  Last physical: 10/4/2018 Last MTM visit: Visit date not found   Next visit within 3 mo: Visit date not found  Next physical within 3 mo: Visit date not found  Prescriber OR PCP: Telly Arias MD  Last diagnosis associated with med order: 1. GERD (gastroesophageal reflux disease)  - omeprazole (PRILOSEC) 40 MG capsule [Pharmacy Med Name: Omeprazole Oral Capsule Delayed Release 40 MG]; TAKE ONE CAPSULE BY MOUTH ONE TIME DAILY  Dispense: 90 capsule; Refill: 0    If protocol passes may refill for 12 months if within 3 months of last provider visit (or a total of 15 months).

## 2021-06-16 NOTE — PROGRESS NOTES
ASSESMENT AND PLAN:  Diagnoses and all orders for this visit:    Schizoaffective disorder, bipolar type (H)  Stable.  Good response to Invega which was given today and he will come back again in 1 month for recheck and next injection.  He will also continue to follow-up with his psychiatrist.  Counseling done today with the patient.    Sinus tachycardia  Improvement with metoprolol introduction but still not resolved.  No side effects from the metoprolol so this will be continued indefinitely.      Reviewed the risks and benefits of the treatment plan with the patient and/or caregiver and we discussed indications for routine and emergent follow-up.        SUBJECTIVE: Last visit patient was started on metoprolol for persistent sinus tachycardia, see that note for details.  He has not noticed any side effects or problems from the medication.  He reports that his mental health has been stable.  He has not had any recurrence of hallucinations.  Overall, he feels like he is doing pretty well.  He is due for his next Invega injection and would like to get that today.    No past medical history on file.  Patient Active Problem List   Diagnosis     Bipolar affective disorder (H)     Primary insomnia     Alcohol abuse     Anxiety     Schizoaffective disorder, bipolar type (H)     Marijuana abuse in remission     Sinus tachycardia     Current Outpatient Medications   Medication Sig Dispense Refill     lamoTRIgine (LAMICTAL) 100 MG tablet Take 100 mg by mouth daily.       metoprolol succinate (TOPROL XL) 50 MG 24 hr tablet Take 1 tablet (50 mg total) by mouth daily. 30 tablet 11     nortriptyline (PAMELOR) 25 MG capsule Take 1 capsule (25 mg total) by mouth at bedtime. 30 capsule 3     omeprazole (PRILOSEC) 40 MG capsule TAKE ONE CAPSULE BY MOUTH ONE TIME DAILY 90 capsule 3     QUEtiapine (SEROQUEL) 200 MG tablet Take 200 mg by mouth at bedtime. Take with 300 mg tablet for total of 500 mg at bedtime       QUEtiapine (SEROQUEL)  300 MG tablet Take 300 mg by mouth at bedtime. Take with 200 mg for total of 500 mg nightly       Current Facility-Administered Medications   Medication Dose Route Frequency Provider Last Rate Last Admin     paliperidone palmitate IM injection 234 mg (INVEGA SUSTENNA)  234 mg Intramuscular Q30 Days Telly Arias MD   234 mg at 03/16/21 1144     Social History     Tobacco Use   Smoking Status Never Smoker   Smokeless Tobacco Never Used   Tobacco Comment    no passive exposure       OBJECTICE: /88 (Patient Site: Left Arm, Patient Position: Sitting)   Pulse (!) 118   Temp 98  F (36.7  C) (Oral)   Resp 20   Ht 6' (1.829 m)   Wt (!) 249 lb (112.9 kg)   SpO2 98%   BMI 33.77 kg/m       No results found for this or any previous visit (from the past 24 hour(s)).     CV-regular tachycardia but improved compared to last visit.   RESP-lungs clear to auscultation.   Psychiatric-speech is somewhat pressured and thought processing is somewhat tangential, mood is stable, affect is normal, thought content negative for suicidal or homicidal ideation.    Telly Arias

## 2021-06-19 NOTE — LETTER
Letter by Magen Driscoll MD at      Author: Magen Driscoll MD Service: -- Author Type: --    Filed:  Encounter Date: 11/25/2019 Status: Signed         Tripp PANTOJA Marilyn  [address]  [place] [address]             November 25, 2019         Dear Mr. Sanders,    Your urine test was normal.  There was no blood seen.      Sincerely,        Electronically signed by Magen Driscoll MD

## 2021-06-19 NOTE — PROGRESS NOTES
"ASSESMENT AND PLAN:  Diagnoses and all orders for this visit:    1. Concern for Tongue lesion  -  Pt anxious about dark veins underneath tongue and worried it might be cancer.  -  Reassurance given. Pt feeling much better.    Reviewed Medical/Social history and Medications.  No new changes.  Discussed indications for emergent medical attention and routine F/u.  Patient/Parent/Guardian engaged in decision making process and verbalized understanding of the options discussed and agreed with the final treatment plan.     SUBJECTIVE:  Tripp Sanders is a 48 y.o. Male who presents with concerns of dark veings underneath his tongue.    Pt is worried it might be cancer since he has never seen veins there before, \" And why are they so dark??\"  Pt denies tongue pain, dysphagia, odynophagia, decreased strength/sensation/taste, swallowing, fever/chills, wheezing, SOB, family hx of Head/Neck cancer, smoking, drinking, street drugs, significant weight loss, CP, n/v, abdominal pain.  Pt states he is feeling good but is worried, \"I don't want to die and then be reincarnated.\"  Reassurance given to Pt. He felt much better, especially after showing several pictures of veins underneath tongue.  Exam is normal.     ROS:  Comprehensive Review of Systems Negative except stated in HPI.     No past medical history on file.  Patient Active Problem List   Diagnosis     Incomplete Emptying Of Bladder     Bipolar affective disorder (H)     Primary insomnia     Current Outpatient Prescriptions   Medication Sig Dispense Refill     amitriptyline (ELAVIL) 25 MG tablet Take 1-2 tablets at bedtime as needed for sleep 60 tablet 11     LATUDA 80 mg Tab   2     omeprazole (PRILOSEC) 40 MG capsule TAKE ONE CAPSULE BY MOUTH ONE TIME DAILY  90 capsule 2     QUEtiapine (SEROQUEL XR) 400 MG 24 hr tablet   5     divalproex (DEPAKOTE) 250 MG 24 hr tablet   0     divalproex (DEPAKOTE) 500 MG 24 hr tablet   2     lurasidone (LATUDA) 40 mg Tab tablet Take 40 " mg by mouth daily.       mirtazapine (REMERON) 30 MG tablet   1     mirtazapine (REMERON) 45 MG tablet Take 45 mg by mouth bedtime.       OLANZapine (ZYPREXA) 2.5 MG tablet   0     QUEtiapine (SEROQUEL) 300 MG tablet Take 300 mg by mouth bedtime.       risperiDONE (RISPERDAL) 2 MG tablet   1     No current facility-administered medications for this visit.      History   Smoking Status     Never Smoker   Smokeless Tobacco     Never Used     OBJECTIVE: BP 98/62  Pulse 86  Temp 98.3  F (36.8  C) (Oral)   Resp 20  Ht 6' (1.829 m)  Wt 201 lb (91.2 kg)  SpO2 99%  BMI 27.26 kg/m2   No results found for this or any previous visit (from the past 24 hour(s)).    PHYSICAL:  General Alert, awake, not in acute distress but appears anxious.   HEENT Normal veins underneath tongue. No lesions, masses.    CV Normal S1 & S2. No murmurs.   RESP Non-labored, RRR, CTAB. No wheezes or crackles.    PSYCH Anxious and nervous initially.        Rohan Mcgowan PA-C

## 2021-06-20 NOTE — PROGRESS NOTES
ASSESMENT AND PLAN:  Diagnoses and all orders for this visit:    Bipolar affective disorder (H) with acute manic episode  Labs ordered as below per the request from the psychiatry clinic.  Initially this visit has been scheduled as an annual wellness visit but this was changed after it became evident that the patient is in the midst of an acute manic episode.  Extensive counseling with the patient to try to determine his current medication regiment, unfortunately, I called to discuss the case with his psychiatrist who was not in clinic and I asked to talk with 1 of her partners at [facility].  I ended up talking with multiple people there who reported that they could not share any information with me.  I shared with them the current situation and they recommended that the patient be evaluated at the emergency room, they were unable to get him in sooner than his usual appointment next week.  After that discussion with [facility], I talked with the patient and counseled him that I agree that given his acute shay he would be most well served and it would be safest for him to get treatment immediately with an emergency room visit which I would call there for us to let them know that he is coming, likely followed by a potentially brief psychiatric admission.  However, the patient refuses.  He is not currently a threat to himself or others so I do not think he is holdable.  I counseled the patient on indications for routine and emergent follow-up, he was encouraged to call his psychiatric clinic immediately today to try to get some instruction from them on what to do with his medications acutely and to see if there is any way that they can get him in sooner than his appointment next Wednesday, over the phone with me the team there indicated they could not get him in sooner but if the patient calls directly perhaps this will change.    Bipolar I disorder (H)  -     Basic Metabolic Panel  -     Thyroid Stimulating Hormone  (TSH)  -     T4, Total  -     Prolactin  -     Cancel: Glucose  -     Glycosylated Hemoglobin A1C  -     Lipid Cascade  -     Lithium    High risk medication use  -     Basic Metabolic Panel  -     Thyroid Stimulating Hormone (TSH)  -     T4, Total  -     Prolactin  -     Cancel: Glucose  -     Glycosylated Hemoglobin A1C  -     Lipid Cascade  -     Lithium    Over 40 minutes of total face-to-face time, more than half in counseling and coordination of care on the above.  Patient should return for a health maintenance and annual wellness visit once his shay has stabilized.      SUBJECTIVE: 48-year-old male who is a psychiatric patient at [facility] comes in for what was scheduled as an annual wellness visit.  However, he is acutely manic.  He reports that he is having racing thoughts, overt excessive energy, very limited and disrupted sleep, and that his symptoms have been getting progressively worse over this last couple of weeks.  He has not had any suicidal thinking or homicidal thinking.  He does notice some disruption to his cognition and difficulty holding and completing thoughts.  As far as I can tell it was recommended that he be on lithium but the patient reports that he stopped taking it several days ago because it was giving him difficulty passing urine.  It is unclear what his current other medications are except for that he reports that he is on Latuda and quetiapine.  However, this and his detailed medication regiment cannot be determined because of the poor communication from the psychiatric clinic as detailed above.    No past medical history on file.  Patient Active Problem List   Diagnosis     Incomplete Emptying Of Bladder     Bipolar affective disorder (H)     Primary insomnia     Current Outpatient Prescriptions   Medication Sig Dispense Refill     amitriptyline (ELAVIL) 25 MG tablet Take 1-2 tablets at bedtime as needed for sleep 60 tablet 11     divalproex (DEPAKOTE) 250 MG 24 hr tablet    0     divalproex (DEPAKOTE) 500 MG 24 hr tablet   2     LATUDA 80 mg Tab   2     lurasidone (LATUDA) 40 mg Tab tablet Take 40 mg by mouth daily.       mirtazapine (REMERON) 45 MG tablet Take 45 mg by mouth bedtime.       omeprazole (PRILOSEC) 40 MG capsule TAKE ONE CAPSULE BY MOUTH ONE TIME DAILY  90 capsule 2     QUEtiapine (SEROQUEL XR) 400 MG 24 hr tablet   5     QUEtiapine (SEROQUEL) 300 MG tablet Take 300 mg by mouth bedtime.       No current facility-administered medications for this visit.      History   Smoking Status     Never Smoker   Smokeless Tobacco     Never Used       OBJECTICE: BP 92/70  Pulse 90  Temp 98  F (36.7  C) (Oral)   Resp 12  Ht 6' (1.829 m)  Wt 206 lb 4 oz (93.6 kg)  SpO2 91%  BMI 27.97 kg/m2     Recent Results (from the past 24 hour(s))   Glycosylated Hemoglobin A1C    Collection Time: 10/04/18 11:17 AM   Result Value Ref Range    Hemoglobin A1c 5.7 3.5 - 6.0 %        Psychiatric-appearance is well-groomed, speech is pressured, mood is not depressed, affect is manic, thought content negative for paranoid or delusional thinking but thought processing is positive for some perseveration and tangential thinking and flight of ideas.   CV-regular rate and rhythm   RESP-lungs clear to auscultation       Telly Arias

## 2021-06-20 NOTE — LETTER
Letter by Telly Arias MD at      Author: Telly Arias MD Service: -- Author Type: --    Filed:  Encounter Date: 10/1/2020 Status: (Other)               [address]  [place] [address]   PHONE [phone]  [phone]     October 1, 2020  Tripp Sanders  [facility] [place] [address]    Dear Tripp:    Below are the results from your recent visit.  Please share these results with your psychiatrist.    Resulted Orders   Prolactin   Result Value Ref Range    Prolactin 45.7 (H) 0.0 - 15.0 ng/mL   Lipid Cascade   Result Value Ref Range    Cholesterol 199 <=199 mg/dL    Triglycerides 316 (H) <=149 mg/dL    HDL Cholesterol 43 >=40 mg/dL    LDL Calculated 93 <=129 mg/dL    Patient Fasting > 8hrs? No    Glycosylated Hemoglobin A1C   Result Value Ref Range    Hemoglobin A1c 5.6 <=5.6 %      Comment:      Normal <5.7% Prediabete 5.7-6.4% Diabletes 6.5% or higher - adopted from ADA consensus guidelines   Glucose   Result Value Ref Range    Glucose 77 74 - 125 mg/dL    Patient Fasting > 8hrs? No     Narrative    Fasting Glucose reference range is 70-99 mg/dL per  American Diabetes Association (ADA) guidelines.         If you have any questions or concerns, please do not hesitate to call.    Sincerely,  Telly Arias MD  October 1, 2020

## 2021-06-20 NOTE — LETTER
Letter by Telly Arias MD at      Author: Telly Arias MD Service: -- Author Type: --    Filed:  Encounter Date: 1/23/2020 Status: (Other)               [address]  [place] [address]   PHONE [phone]  [phone]     January 23, 2020  Tripp Sanders  [facility] [place] [address]    Dear Tripp:    Below are the results from your recent visit.  Your results should be taken to your next appointment with your psychiatry clinic and discussed.    Resulted Orders   Prolactin   Result Value Ref Range    Prolactin 66.4 (H) 0.0 - 15.0 ng/mL   Glucose   Result Value Ref Range    Glucose 87 74 - 125 mg/dL    Patient Fasting > 8hrs? No     Narrative    Fasting Glucose reference range is 70-99 mg/dL per  American Diabetes Association (ADA) guidelines.   Glycosylated Hemoglobin A1C   Result Value Ref Range    Hemoglobin A1c 5.5 3.5 - 6.0 %   Lipid Profile   Result Value Ref Range    Triglycerides 405 (H) <=149 mg/dL    Cholesterol 217 (H) <=199 mg/dL    LDL Calculated        Comment:      Invalid, Triglycerides >400    HDL Cholesterol 43 >=40 mg/dL    Patient Fasting > 8hrs? No      If you have any questions or concerns, please do not hesitate to call.    Sincerely,  Telly Arias MD  January 23, 2020

## 2021-06-21 NOTE — LETTER
Letter by Telly Arias MD at      Author: Telly Arias MD Service: -- Author Type: --    Filed:  Encounter Date: 4/7/2021 Status: (Other)               [address]  [place] [address]   PHONE [phone]  [phone]     April 7, 2021  Tripp Chakraborty[address] [facility] [address]  [place] [address]    Dear Tripp:    Below are the results from your recent visit.  Your results need to be reviewed with your psychiatrist, please take this letter to your next appointment.    Resulted Orders   Valproic Acid (Depakene )   Result Value Ref Range    Valproic Acid 26.5 (L) 50.0 - 150.0 ug/mL      Comment:      Recommended therapeutic trough conc range when used  for manic episodes associated with bipolar disorder:   ug/ml.   Amylase   Result Value Ref Range    Amylase 53 5 - 120 U/L   ALT (SGPT)   Result Value Ref Range    ALT 42 0 - 45 U/L   Glucose   Result Value Ref Range    Glucose 102 74 - 125 mg/dL    Patient Fasting > 8hrs? No     Narrative    Fasting Glucose reference range is 70-99 mg/dL per  American Diabetes Association (ADA) guidelines.   Glycosylated Hemoglobin A1C   Result Value Ref Range    Hemoglobin A1c 5.5 <=5.6 %   Lipid Profile   Result Value Ref Range    Triglycerides 160 (H) <=149 mg/dL    Cholesterol 152 <=199 mg/dL    LDL Calculated 81 <=129 mg/dL    HDL Cholesterol 39 (L) >=40 mg/dL    Patient Fasting > 8hrs? No    Prolactin   Result Value Ref Range    Prolactin 59.1 (H) 0.0 - 15.0 ng/mL   HM1 (CBC with Diff)   Result Value Ref Range    WBC 7.7 4.0 - 11.0 thou/uL    RBC 4.26 (L) 4.40 - 6.20 mill/uL    Hemoglobin 14.1 14.0 - 18.0 g/dL    Hematocrit 40.2 40.0 - 54.0 %    MCV 94 80 - 100 fL    MCH 33.1 27.0 - 34.0 pg    MCHC 35.1 32.0 - 36.0 g/dL    RDW 13.2 11.0 - 14.5 %    Platelets 337 140 - 440 thou/uL    MPV 8.9 7.0 - 10.0 fL    Neutrophils % 62 50 - 70 %    Lymphocytes % 24 20 - 40 %    Monocytes % 9 2 - 10 %    Eosinophils % 4 0 - 6 %    Basophils  % 1 0 - 2 %    Immature Granulocyte % 0 <=0 %    Neutrophils Absolute 4.8 2.0 - 7.7 thou/uL    Lymphocytes Absolute 1.9 0.8 - 4.4 thou/uL    Monocytes Absolute 0.7 0.0 - 0.9 thou/uL    Eosinophils Absolute 0.3 0.0 - 0.4 thou/uL    Basophils Absolute 0.1 0.0 - 0.2 thou/uL    Immature Granulocyte Absolute 0.0 <=0.0 thou/uL         If you have any questions or concerns, please do not hesitate to call.    Sincerely,      Telly Arias MD  April 7, 2021

## 2021-06-21 NOTE — LETTER
Letter by Telly Arias MD at      Author: Telly Arias MD Service: -- Author Type: --    Filed:  Encounter Date: 4/22/2021 Status: (Other)               [facility]  [address] [address]  [place] [address]   PHONE [phone]  [phone]     April 22, 2021  Tripp Chakraborty[address] [place] [address]    Dear Tripp:    Below are the results from your recent visit.  Your results are all normal.  There is no blood in the urine, no urinary infection, no sexually-transmitted infection, no signs of prostate cancer.  I would like to discuss with you whether you would like to go forward with further testing to look into the problem that we talked about in clinic, please call me at the clinic or we can discuss it next month when you come in for your follow-up visit.    Resulted Orders   Urinalysis-UC if Indicated   Result Value Ref Range    Color, UA Yellow Colorless, Yellow, Straw, Light Yellow    Clarity, UA Clear Clear    Glucose, UA Negative Negative    Protein, UA Negative Negative    Bilirubin, UA Negative Negative    Urobilinogen, UA 0.2 E.U./dL 0.2 E.U./dL, 1.0 E.U./dL    pH, UA 5.5 5.0 - 8.0    Blood, UA Negative Negative    Ketones, UA Negative Negative    Nitrite, UA Negative Negative    Leukocytes, UA Negative Negative    Specific Gravity, UA 1.015 1.005 - 1.030    Narrative    Microscopic not indicated  UC not indicated   Chlamydia trachomatis & Neisseria gonorrhoeae, Amplified Detection   Result Value Ref Range    Chlamydia trachomatis, Amplified Detection Negative Negative    Neisseria gonorrhoeae, Amplified Detection Negative Negative   PSA (Prostatic-Specific Antigen), Diagnostic   Result Value Ref Range    PSA 0.5 0.0 - 3.5 ng/mL    Narrative    Method is Abbott Prostate-Specific Antigen (PSA)  Standard-WHO 1st International (90:10)         If you have any questions or concerns, please do not hesitate to call.    Sincerely,      Telly Arias MD  April 22,  2021

## 2021-06-22 NOTE — PROGRESS NOTES
ASSESMENT AND PLAN:  Diagnoses and all orders for this visit:    1.  Insomnia due to other mental disorder  -  Pt states he has not been able to fall asleep even though he is very tired x 4 days.  -  Pt would like Restoril that Dr. Arias gave him 2 years ago and is the only thing that helps him fall asleep.  He has has tried Amitriptyline and states it made him had nightmares and does not want to be on it.  Pt has also tried Unisom and Melatonin but it not effective.   -  Suggest trying Benadryl again. He states he has tried in the past but has not help.  I told him to try and if it doesn't work then he can try Restoril.    -  Discussed risks and benefits.  Pt understands and will only take as directed.  -  I also recommended good sleep hygiene, Pt endorses he is doing everything.    -  I believe his insomnia is more psychological given Pt is very hyperactive and appears very agitated during OV.  He denies harm to self/others and feeling depressed, worried or anxious.    -  Follow up if symptoms not better or worsens.    Ordered:    -    Temazepam (RESTORIL) 7.5 MG capsule; Take 1 capsule (7.5 mg total) by mouth at bedtime as needed for sleep.  Dispense: 20 capsule; Refill: 0    2. Encounter for routine adult health examination without abnormal findings  -  Colorectal Cancer Screening:  Detailed discussion on benefits of Colon cancer screening.  Went through the different options available for screening with the risks/benefits of each.  All questions answered.    -  Pt does not want Cologuard and wants colonoscopy.    Ordered:  -     Ambulatory referral for Colonoscopy    Reviewed Medical/Social history and Medications.  See new changes above.   Discussed indications for emergent medical attention and routine F/u.  Patient/Parent/Guardian engaged in decision making process and verbalized understanding of the options discussed and agreed with the final treatment plan.     SUBJECTIVE:  Tripp Sanders is a 48 y.o.  "male who presents  for evaluation of Insomnia x 3-4 days.     Pt states he has recently triedAmitriptyline but it gave him nightmares and Unisom but did not work.  He has also tried Benadryl in the past but \" doesn't do anything.\"   Pt is requesting Restoril, \"Dr. Arias gave me some 2 years ago and it's the only thing that helps me fall asleep.  I told him it is a controlled substance and will not continue.  Pt will try Benadryl again and if not helpful then will try Restoril.   Pt understands.  He denies any new changes in life that may be causing him to not fall asleep.  Also discussed good sleep hygiene; Pt endorses he has done all of them already and is not helping.      Denies fever/chills, wheezing, SOB, CP, n/v, abdominal pain, diarrhea/constipation, hematochezia, anxiousness, sadness, harm to self/others.      ROS:  Comprehensive Review of Systems Negative except stated in HPI.     No past medical history on file.  Patient Active Problem List   Diagnosis     Incomplete Emptying Of Bladder     Bipolar affective disorder (H)     Primary insomnia     Current Outpatient Medications   Medication Sig Dispense Refill     divalproex (DEPAKOTE) 250 MG 24 hr tablet   0     divalproex (DEPAKOTE) 500 MG 24 hr tablet   2     LATUDA 80 mg Tab   2     lurasidone (LATUDA) 40 mg Tab tablet Take 40 mg by mouth daily.       omeprazole (PRILOSEC) 40 MG capsule TAKE ONE CAPSULE BY MOUTH ONE TIME DAILY  90 capsule 3     QUEtiapine (SEROQUEL XR) 400 MG 24 hr tablet   5     hydrOXYzine HCl (ATARAX) 50 MG tablet Take 1 tablet (50 mg total) by mouth at bedtime. 30 tablet 0     QUEtiapine (SEROQUEL) 300 MG tablet Take 300 mg by mouth at bedtime .             No current facility-administered medications for this visit.      Social History     Tobacco Use   Smoking Status Never Smoker   Smokeless Tobacco Never Used     OBJECTIVE: /78   Pulse (!) 130   Temp 97.3  F (36.3  C) (Oral)   Resp 24   Ht 6' (1.829 m)   Wt 209 lb 4 oz " (94.9 kg)   SpO2 99%   BMI 28.38 kg/m     No results found for this or any previous visit (from the past 24 hour(s)).    PHYSICAL:  General Alert, awake, not in acute distress.   HEENT:             -Head Atraumatic, normocephalic.            -Eyes PERRL, no erythema, discharge, conjunctiva clear.             -Ears TMs intact, no drainage, no erythema or edema.            -Nose    Nostrils patent,  no edema.            -Throat Oropharynx without edema, erythema.  Uvula midline, no deviation.             -Neck Neck FROM, no adenopathy.  Thyroid not visibly enlarged.   CV Normal S1 & S2. No murmurs.   RESP Non-labored, RRR, CTAB. No wheezes or crackles.    PSYCH Anxious and hyperactive.  Denies harm to self/others.        Rohan Mcgowan PA-C

## 2021-06-22 NOTE — PROGRESS NOTES
ASSESMENT AND PLAN:  Diagnoses and all orders for this visit:    1.  Primary insomnia  -  Pt was seen last week in clinic with other provider and given Hydroxyzine Rx for insomnia.  He reports it has helped him sleep very well, and requesting refills.   -  Pt endorses getting letter from his insurance stating he would need PA before he can get refills for Hydroxyzine.  I told him we did not received anything from Pharmacy.   Called his Pharmacy and they stated Pt tried to refill too soon and would need PA for early refills; as far as whether it is on his formulary, Pt will need to call his insurance.   Pt later states he lost his meds and tried picking up early.    -  Placed verbal order with Pharmacy for refills , however Pt will not be able to  until Jan 13 since last Rx was made on 12/13/18 and supposed to last for 1 month.    -  Advised Pt to call his insurance to see if he needs PA for further refills.  For now, we will try different sleep med.    -  F/u in 2-3 weeks if not better.    Refill:        -     Hydroxyzine 50MG, Take 1 tablet at bedtime. Dispense #30. R2.    Ordered:  -     ramelteon (ROZEREM) 8 mg tablet  Dispense: 30 tablet; Refill: 0    2.  Bipolar affective disorder, remission status unspecified (H)  -  Pt reports seeing therapist with feeling well.       3. Health Maintenance  -  Decline Influenza vaccine.  -  Pt endorses appt with [facility] for Colonoscopy on 2/11/19.     Reviewed Medical/Social history and Medications.  See new changes above.  Over 40 minutes total time spent with patient, with more than 50% in counseling and coordination of care on the above issues.   Discussed indications for emergent medical attention and routine F/u.  Patient/Parent/Guardian engaged in decision making process and verbalized understanding of the options discussed and agreed with the final treatment plan.     SUBJECTIVE:  Tripp Sanders is a 48 y.o. male who presents  for evaluation of his  "Insomnia.    Pt recently saw Dr. Cason for insomnia and given Hydroxyzine.  He reports helping him greatly, \"I am sleeping very well and need refills.\"   Pt reports he got letter from his insurance requiring PA before he can get refills on Hydroxyzine, \"I have it but I don't know where it is anymore.\"   Pt appeared anxious about not getting his refills.  Called Pharmacy but not open until 9am. I called his pharmacy again and they state Pt tried picking up Rx too early and will not be able to  refills until 1/3/19.  They are not aware of any PA and Pt will need to call his insurance about coverage.  I gave verbal orders for refills on Hydroxyzine.  Pt denies SE, new or worsening sxs.   Pt agrees to try new med today while he tries contacting his insurance.      ROS:  Comprehensive Review of Systems Negative except stated in HPI.     No past medical history on file.  Patient Active Problem List   Diagnosis     Incomplete Emptying Of Bladder     Bipolar affective disorder (H)     Primary insomnia     Current Outpatient Medications   Medication Sig Dispense Refill     hydrOXYzine HCl (ATARAX) 50 MG tablet Take 1 tablet (50 mg total) by mouth at bedtime. 30 tablet 0     LATUDA 80 mg Tab   2     omeprazole (PRILOSEC) 40 MG capsule TAKE ONE CAPSULE BY MOUTH ONE TIME DAILY  90 capsule 3     QUEtiapine (SEROQUEL) 300 MG tablet Take 300 mg by mouth at bedtime .             divalproex (DEPAKOTE) 250 MG 24 hr tablet   0     divalproex (DEPAKOTE) 500 MG 24 hr tablet   2     lurasidone (LATUDA) 40 mg Tab tablet Take 40 mg by mouth daily.       QUEtiapine (SEROQUEL XR) 400 MG 24 hr tablet   5     ramelteon (ROZEREM) 8 mg tablet Take 1 tablet (8 mg total) by mouth at bedtime as needed for sleep. 30 tablet 0     No current facility-administered medications for this visit.      Social History     Tobacco Use   Smoking Status Never Smoker   Smokeless Tobacco Never Used     OBJECTIVE: BP 96/76   Pulse (!) 104   Temp 97.6  F (36.4 "  C) (Oral)   Resp 24   Ht 6' (1.829 m)   Wt 212 lb (96.2 kg)   SpO2 98%   BMI 28.75 kg/m     No results found for this or any previous visit (from the past 24 hour(s)).    PHYSICAL:  General Alert, awake, not in acute distress.   CV Normal S1 & S2. No murmurs.   RESP Non-labored, RRR, CTAB. No wheezes or crackles.    PSYCH Anxious and hyperactive.        MATTHEW CastroC

## 2021-06-22 NOTE — TELEPHONE ENCOUNTER
RN cannot approve Refill Request    RN can NOT refill this medication recently started, clinic visit on 1/4/19 but no info whether to continue. Please advise . Last office visit: 12/13/2018 Juanjo Cason MD Last Physical: Visit date not found Last MTM visit: Visit date not found Last visit same specialty: 1/4/2019 Rohan Mcgowan PA-C.  Next visit within 3 mo: Visit date not found  Next physical within 3 mo: Visit date not found      Artur Fay, No Connection Triage/Med Refill 1/5/2019    Requested Prescriptions   Pending Prescriptions Disp Refills     hydrOXYzine HCl (ATARAX) 50 MG tablet [Pharmacy Med Name: HydrOXYzine HCl Oral Tablet 50 MG] 30 tablet 0     Sig: Take 1 tablet (50 mg total) by mouth at bedtime.    Antihistamine Refill Protocol Passed - 1/4/2019  9:33 AM       Passed - Patient has had office visit/physical in last year    Last office visit with prescriber/PCP: 12/13/2018 Juanjo Cason MD OR same dept: 1/4/2019 Rohan Mcgowan PA-C OR same specialty: 1/4/2019 Rohan Mcgowan PA-C  Last physical: Visit date not found Last MTM visit: Visit date not found   Next visit within 3 mo: Visit date not found  Next physical within 3 mo: Visit date not found  Prescriber OR PCP: Juanjo Cason MD  Last diagnosis associated with med order: There are no diagnoses linked to this encounter.  If protocol passes may refill for 12 months if within 3 months of last provider visit (or a total of 15 months).

## 2021-06-22 NOTE — TELEPHONE ENCOUNTER
RN Triage:    Spoke with 48 yr old Tripp who is requesting to schedule a follow up medication check office visit with Rohan SARABIA.    Patient states he missed his OV last week and had difficulty getting through to the clinic due to constant busy signal.    Pt states he needs a refill and prior authorization for Hydroxyzine HCL which was prescribed 12/13/18, 30 tablets for insomnia.    Patient reports this medication is helping.  He only has a couple tablets remaining.    PLAN:  Transferred patient to [facility] to schedule OV this week.  Patient scheduled for 1/4/19 at 8:30 am with Rohan SARABIA.    Will forward to provider for consideration of prior authorization of this medication.  Patient aware the office is closed today, New Year's day.    Mariann Petersen RN   Care Connection RN Triage      Reason for Disposition    Caller requesting a NON-URGENT new prescription or refill and triager unable to refill per unit policy    Protocols used: MEDICATION QUESTION CALL-A-

## 2021-06-22 NOTE — PROGRESS NOTES
"ASSESMENT AND PLAN:  Diagnoses and all orders for this visit:    Insomnia, unspecified type  We will try hydroxyzine 50 mg to take at night as needed.  Called pharmacy and cancel temazepam.  Advised to call his psychiatrist to get sooner appointment.  He has appointment scheduled in January 2019.  He will follow-up with PCP in a few weeks.    Bipolar affective disorder, remission status unspecified (H)  Managed by psychiatry.    Other orders  -     hydrOXYzine HCl (ATARAX) 50 MG tablet; Take 1 tablet (50 mg total) by mouth at bedtime.  Dispense: 30 tablet; Refill: 0        SUBJECTIVE: Tripp Sanders is a 48-year-old male with history of bipolar disorder here with a complaint of insomnia.  He was seen yesterday by 1 of the providers here and was prescribed temazepam 7.5 mg to take at bedtime as needed.  He went to the pharmacy and was told that he will not be able to fill the prescription until 12/17/2018.  He is currently on Seroquel 400 mg daily, managed by his psychiatrist.  States he has not been able to sleep at all for at least the past 2 weeks.  His psychiatrist appointment is not until January 2019.  He states he was told by his psychiatrist that there is nothing they can do.  He does not want to take temazepam stating\"I have history of addiction, I am afraid that temazepam might make me want to drink\".  He denied being suicidal.    No past medical history on file.  Patient Active Problem List   Diagnosis     Incomplete Emptying Of Bladder     Bipolar affective disorder (H)     Primary insomnia       Allergies:    Allergies   Allergen Reactions     Trazodone      priapism       Social History     Tobacco Use   Smoking Status Never Smoker   Smokeless Tobacco Never Used       Review of systems otherwise negative except as listed in HPI.   Social History     Tobacco Use   Smoking Status Never Smoker   Smokeless Tobacco Never Used       OBJECTICE: /84 (Patient Site: Left Arm, Patient Position: Sitting, " Cuff Size: Adult Regular)   Pulse (!) 108   Temp 97.4  F (36.3  C)   Ht 6' (1.829 m)   Wt 211 lb 3 oz (95.8 kg)   SpO2 97%   BMI 28.64 kg/m            GEN-alert,  in no apparent distress.  HEENT-mucous membranes are moist, neck is supple.  CV-regular rate and rhythm with no murmur.   RESP-lungs clear to auscultation .    This transcription uses voice recognition software, which may contain typographical errors.        Juanjo Cason   12/13/2018

## 2021-06-25 NOTE — PROGRESS NOTES
ASSESMENT AND PLAN:  Diagnoses and all orders for this visit:    Schizoaffective disorder, bipolar type (H)  Stable.  Counseling done today with the patient.  Counseled on avoidance of marijuana.  Patient wishes to continue with the current treatment plan which seems to be doing well for him.  Invega injection given today and he will follow-up again in 1 month.      Reviewed the risks and benefits of the treatment plan with the patient and/or caregiver and we discussed indications for routine and emergent follow-up.        SUBJECTIVE: 50-year-old male here for follow-up.  Since last visit he has not had any recurrence of hallucinations or the paresthesias that were quite intense and bothersome to him.  See previous notes for details.  Patient acknowledges that he has used marijuana a few times.  He has family and neighbors that use frequently around him and sometimes is difficult for him not to use.  Feels like his mood has been stable.  No suicidal thinking.    No past medical history on file.  Patient Active Problem List   Diagnosis     Bipolar affective disorder (H)     Primary insomnia     Alcohol abuse     Anxiety     Schizoaffective disorder, bipolar type (H)     Marijuana abuse in remission     Sinus tachycardia     Current Outpatient Medications   Medication Sig Dispense Refill     divalproex (DEPAKOTE ER) 500 MG 24 hour tablet Take 500 mg by mouth at bedtime.       metoprolol succinate (TOPROL XL) 50 MG 24 hr tablet Take 1 tablet (50 mg total) by mouth daily. 30 tablet 11     omeprazole (PRILOSEC) 40 MG capsule TAKE ONE CAPSULE BY MOUTH ONE TIME DAILY  90 capsule 3     QUEtiapine (SEROQUEL) 200 MG tablet Take 200 mg by mouth at bedtime. Take with 300 mg tablet for total of 500 mg at bedtime       QUEtiapine (SEROQUEL) 300 MG tablet Take 300 mg by mouth at bedtime. Take with 200 mg for total of 500 mg nightly       Current Facility-Administered Medications   Medication Dose Route Frequency Provider Last Rate  Last Admin     paliperidone palmitate IM injection 234 mg (INVEGA SUSTENNA)  234 mg Intramuscular Q30 Days Telly Arias MD   234 mg at 05/24/21 1118     Social History     Tobacco Use   Smoking Status Never Smoker   Smokeless Tobacco Never Used   Tobacco Comment    no passive exposure       OBJECTICE: /82 (Patient Site: Left Arm, Patient Position: Sitting)   Pulse 92   Temp 97.7  F (36.5  C) (Oral)   Resp 14   Ht 6' (1.829 m)   Wt (!) 250 lb (113.4 kg)   SpO2 98%   BMI 33.91 kg/m       No results found for this or any previous visit (from the past 24 hour(s)).     CV-regular rate and rhythm   RESP-lungs clear to auscultation   Psychiatric-appearance is well-groomed, speech is slightly pressured, mood is not depressed, affect is normal, thought content negative for suicidal or homicidal ideation, thought processing negative for paranoid or delusional thinking.    Telly Arias

## 2021-06-27 NOTE — TELEPHONE ENCOUNTER
Called pt to relay apt for tomorrow with PCP at 140 pm  Per PCP ok. Understood.  Thanks.    DISPLAY PLAN FREE TEXT

## 2021-06-29 ENCOUNTER — RECORDS - HEALTHEAST (OUTPATIENT)
Dept: SCHEDULING | Facility: CLINIC | Age: 51
End: 2021-06-29

## 2021-06-29 ENCOUNTER — TRANSCRIBE ORDERS (OUTPATIENT)
Dept: FAMILY MEDICINE | Facility: CLINIC | Age: 51
End: 2021-06-29

## 2021-06-29 DIAGNOSIS — F25.0 SCHIZOAFFECTIVE DISORDER, BIPOLAR TYPE (H): Primary | ICD-10-CM

## 2021-06-29 NOTE — PROGRESS NOTES
Progress Notes by Jaimee Elam LPN at 7/29/2020  3:00 PM     Author: Jaimee Elam LPN Service: -- Author Type: Licensed Nurse    Filed: 7/29/2020  3:05 PM Encounter Date: 7/29/2020 Status: Attested    : Jaimee Elam LPN (Licensed Nurse)    Related Notes: Original Note by Jaimee Elam LPN (Licensed Nurse) filed at 7/29/2020  3:04 PM    Cosigner: Norma Majano MD at 7/29/2020  6:27 PM    Attestation signed by Norma Majano MD at 7/29/2020  6:27 PM    thanks            Summary: Early administration request      Due to transportation issues, patient is unable to come for next Invega injection in 30 days.  He made an appointment for 8/26/20 (Wed.), which is 28 days.      Patient was offered 8/31 (Mon.) appointment but insisted on Wednesday.    Will Provider approve early administration?      Thank you.

## 2021-06-29 NOTE — PROGRESS NOTES
As part of the required manual data conversion process for integration, this encounter was created to document a CAM (Clinic Administered Medication) order. This information was copied from the [facility] patient's chart to the [facility] patient chart.     Antolin Bryson RPH  June 29, 2021

## 2021-07-04 NOTE — ADDENDUM NOTE
Addendum Note by Nancy Jacinto RT (R) at 3/16/2021 11:40 AM     Author: Nancy Jacinto RT (R) Service: -- Author Type: Radiologic Technologist    Filed: 4/1/2021  4:27 PM Encounter Date: 3/16/2021 Status: Signed    : Nancy Jacinto RT (R) (Radiologic Technologist)    Addended by: NANCY JACINTO on: 4/1/2021 04:27 PM        Modules accepted: Orders

## 2021-07-05 PROBLEM — E78.1 HYPERTRIGLYCERIDEMIA: Status: ACTIVE | Noted: 2021-07-01

## 2021-07-06 VITALS
HEIGHT: 72 IN | OXYGEN SATURATION: 98 % | WEIGHT: 250 LBS | HEART RATE: 92 BPM | TEMPERATURE: 97.7 F | DIASTOLIC BLOOD PRESSURE: 82 MMHG | RESPIRATION RATE: 14 BRPM | SYSTOLIC BLOOD PRESSURE: 120 MMHG | BODY MASS INDEX: 33.86 KG/M2

## 2021-07-07 NOTE — TELEPHONE ENCOUNTER
New Appointment Needed  What is the reason for the visit:    Same Date/Next Day Appt Request  What is the reason for your visit?:  Bi- polar issues, needs shot    Provider Preference: PCP only  How soon do you need to be seen?: Wednesday, Thursday or Friday this week, patient missed appointment on 06/28/21- was helping brother in emergency room.  Waitlist offered?: No  Okay to leave a detailed message:  Yes

## 2021-08-03 ENCOUNTER — OFFICE VISIT (OUTPATIENT)
Dept: FAMILY MEDICINE | Facility: CLINIC | Age: 51
End: 2021-08-03
Payer: COMMERCIAL

## 2021-08-03 VITALS
DIASTOLIC BLOOD PRESSURE: 72 MMHG | HEART RATE: 120 BPM | TEMPERATURE: 97.5 F | HEIGHT: 72 IN | WEIGHT: 242.25 LBS | BODY MASS INDEX: 32.81 KG/M2 | SYSTOLIC BLOOD PRESSURE: 94 MMHG | OXYGEN SATURATION: 97 % | RESPIRATION RATE: 16 BRPM

## 2021-08-03 DIAGNOSIS — R00.0 SINUS TACHYCARDIA: ICD-10-CM

## 2021-08-03 DIAGNOSIS — F25.0 SCHIZOAFFECTIVE DISORDER, BIPOLAR TYPE (H): Primary | ICD-10-CM

## 2021-08-03 DIAGNOSIS — F41.9 ANXIETY: ICD-10-CM

## 2021-08-03 PROCEDURE — 99214 OFFICE O/P EST MOD 30 MIN: CPT | Mod: 25 | Performed by: FAMILY MEDICINE

## 2021-08-03 PROCEDURE — 96372 THER/PROPH/DIAG INJ SC/IM: CPT | Performed by: PHARMACIST

## 2021-08-03 RX ORDER — PROPRANOLOL HYDROCHLORIDE 80 MG/1
80 CAPSULE, EXTENDED RELEASE ORAL DAILY
Qty: 30 CAPSULE | Refills: 11 | Status: SHIPPED | OUTPATIENT
Start: 2021-08-03 | End: 2022-10-11

## 2021-08-03 ASSESSMENT — MIFFLIN-ST. JEOR: SCORE: 1981.84

## 2021-08-03 NOTE — PROGRESS NOTES
"ASSESMENT AND PLAN:  Diagnoses and all orders for this visit:  Schizoaffective disorder, bipolar type (H)  Stable.  Extensive counseling done today with the patient about his worries about his bills and the cost of the Invega injection.  I talked with one of our staff to see if we can clarify his bills.  If indeed he is going to be having significant out-of-pocket costs monthly related to the Invega injections then we would plan to switch him to risperidone injections if possible.  Invega injection given today after discussion with the patient.  Follow-up again in 1 month.    Sinus tachycardia  We talked about options including increasing his metoprolol dose but I think the best course of action would be to switch from metoprolol to propanolol given that we would also get a benefit in control of anxiety I think.  Reviewed options with the patient and he would like to proceed with this plan.  Metoprolol discontinued.  Starting propranolol as prescribed below.  -     propranolol ER (INDERAL LA) 80 MG 24 hr capsule; Take 1 capsule (80 mg) by mouth daily      Anxiety  -     propranolol ER (INDERAL LA) 80 MG 24 hr capsule; Take 1 capsule (80 mg) by mouth daily      Reviewed the risks and benefits of the treatment plan with the patient and/or caregiver and we discussed indications for routine and emergent follow-up.        SUBJECTIVE: 51-year-old male has a history of schizoaffective disorder, had been severely out-of-control in the past but has been stable since he has been on monthly Invega injections.  Patient very much does not want to stop these injections.  He says \"I cannot go back to having hallucinations all of the time\" and is very worried because he has been getting bills in the mail that have now accumulated to almost $700.  Patient comes in monthly for follow-up visits and Invega injections.  He is on a very limited income and with his rent and food costs does not have any left over money.  This is been " stressing him out quite a bit.  He has not had any recurrence of hallucinations.  He does feel increase in anxiety.  He is also concerned because his tachycardia has returned, previously had been well controlled with metoprolol.  Patient had been in at another clinic and was told that his pulse was in the 120s.    No past medical history on file.  Patient Active Problem List   Diagnosis     Bipolar affective disorder (H)     Primary insomnia     Alcohol abuse     Anxiety     Schizoaffective disorder, bipolar type (H)     Marijuana abuse in remission     Sinus tachycardia     Hypertriglyceridemia     Current Outpatient Medications   Medication Sig Dispense Refill     divalproex (DEPAKOTE ER) 500 MG 24 hour tablet [DIVALPROEX (DEPAKOTE ER) 500 MG 24 HOUR TABLET] Take 500 mg by mouth at bedtime.       omeprazole (PRILOSEC) 40 MG capsule [OMEPRAZOLE (PRILOSEC) 40 MG CAPSULE] TAKE ONE CAPSULE BY MOUTH ONE TIME DAILY  90 capsule 3     propranolol ER (INDERAL LA) 80 MG 24 hr capsule Take 1 capsule (80 mg) by mouth daily 30 capsule 11     QUEtiapine (SEROQUEL) 200 MG tablet [QUETIAPINE (SEROQUEL) 200 MG TABLET] Take 200 mg by mouth at bedtime. Take with 300 mg tablet for total of 500 mg at bedtime       QUEtiapine (SEROQUEL) 300 MG tablet [QUETIAPINE (SEROQUEL) 300 MG TABLET] Take 300 mg by mouth at bedtime. Take with 200 mg for total of 500 mg nightly       History   Smoking Status     Never Smoker   Smokeless Tobacco     Never Used     Comment: no passive exposure       OBJECTICE: BP 94/72   Pulse 120   Temp 97.5  F (36.4  C) (Oral)   Resp 16   Ht 1.829 m (6')   Wt 109.9 kg (242 lb 4 oz)   SpO2 97%   BMI 32.86 kg/m       No results found for this or any previous visit (from the past 24 hour(s)).     CV-regular tachycardia with no murmur   RESP-lungs clear to auscultation   Psychiatric-appearance is well-groomed, speech is slightly pressured, mood is not depressed, affect is anxious, thought content negative for  suicidal or homicidal ideation, thought processing negative for paranoid or delusional thinking.    Telly Arias MD

## 2021-09-10 ENCOUNTER — ALLIED HEALTH/NURSE VISIT (OUTPATIENT)
Dept: FAMILY MEDICINE | Facility: CLINIC | Age: 51
End: 2021-09-10
Payer: COMMERCIAL

## 2021-09-10 DIAGNOSIS — F25.0 SCHIZOAFFECTIVE DISORDER, BIPOLAR TYPE (H): Primary | ICD-10-CM

## 2021-09-10 PROCEDURE — 99207 PR NO CHARGE NURSE ONLY: CPT

## 2021-09-10 PROCEDURE — 96372 THER/PROPH/DIAG INJ SC/IM: CPT | Performed by: FAMILY MEDICINE

## 2021-09-10 PROCEDURE — 90686 IIV4 VACC NO PRSV 0.5 ML IM: CPT

## 2021-09-10 PROCEDURE — G0008 ADMIN INFLUENZA VIRUS VAC: HCPCS

## 2021-09-22 DIAGNOSIS — Z79.899 HIGH RISK MEDICATION USE: ICD-10-CM

## 2021-09-22 DIAGNOSIS — F31.0 BIPOLAR AFFECTIVE DISORDER, CURRENT EPISODE HYPOMANIC (H): Primary | ICD-10-CM

## 2021-09-27 ENCOUNTER — LAB (OUTPATIENT)
Dept: LAB | Facility: CLINIC | Age: 51
End: 2021-09-27
Payer: COMMERCIAL

## 2021-09-27 DIAGNOSIS — F31.0 BIPOLAR AFFECTIVE DISORDER, CURRENT EPISODE HYPOMANIC (H): ICD-10-CM

## 2021-09-27 DIAGNOSIS — Z79.899 HIGH RISK MEDICATION USE: ICD-10-CM

## 2021-09-27 LAB
ALBUMIN SERPL-MCNC: 3.2 G/DL (ref 3.5–5)
ALP SERPL-CCNC: 85 U/L (ref 45–120)
ALT SERPL W P-5'-P-CCNC: 23 U/L (ref 0–45)
AMYLASE SERPL-CCNC: 41 U/L (ref 5–120)
AST SERPL W P-5'-P-CCNC: 19 U/L (ref 0–40)
BILIRUB DIRECT SERPL-MCNC: 0.2 MG/DL
BILIRUB SERPL-MCNC: 0.5 MG/DL (ref 0–1)
CHOLEST SERPL-MCNC: 170 MG/DL
ERYTHROCYTE [DISTWIDTH] IN BLOOD BY AUTOMATED COUNT: 12.2 % (ref 10–15)
FASTING STATUS PATIENT QL REPORTED: NO
FASTING STATUS PATIENT QL REPORTED: NO
GLUCOSE BLD-MCNC: 190 MG/DL (ref 70–125)
HBA1C MFR BLD: 5 % (ref 0–5.6)
HCT VFR BLD AUTO: 43.2 % (ref 40–53)
HDLC SERPL-MCNC: 35 MG/DL
HGB BLD-MCNC: 15 G/DL (ref 13.3–17.7)
LDLC SERPL CALC-MCNC: 87 MG/DL
MCH RBC QN AUTO: 33.9 PG (ref 26.5–33)
MCHC RBC AUTO-ENTMCNC: 34.7 G/DL (ref 31.5–36.5)
MCV RBC AUTO: 98 FL (ref 78–100)
PLATELET # BLD AUTO: 285 10E3/UL (ref 150–450)
PROLACTIN SERPL-MCNC: 67.4 NG/ML (ref 0–15)
PROT SERPL-MCNC: 6.2 G/DL (ref 6–8)
RBC # BLD AUTO: 4.42 10E6/UL (ref 4.4–5.9)
TRIGL SERPL-MCNC: 241 MG/DL
VALPROATE SERPL-MCNC: 42.3 UG/ML
WBC # BLD AUTO: 6.5 10E3/UL (ref 4–11)

## 2021-09-27 PROCEDURE — 80076 HEPATIC FUNCTION PANEL: CPT | Performed by: FAMILY MEDICINE

## 2021-09-27 PROCEDURE — 80164 ASSAY DIPROPYLACETIC ACD TOT: CPT | Performed by: FAMILY MEDICINE

## 2021-09-27 PROCEDURE — 83036 HEMOGLOBIN GLYCOSYLATED A1C: CPT | Performed by: FAMILY MEDICINE

## 2021-09-27 PROCEDURE — 82947 ASSAY GLUCOSE BLOOD QUANT: CPT | Performed by: FAMILY MEDICINE

## 2021-09-27 PROCEDURE — 80061 LIPID PANEL: CPT | Performed by: FAMILY MEDICINE

## 2021-09-27 PROCEDURE — 82150 ASSAY OF AMYLASE: CPT | Performed by: FAMILY MEDICINE

## 2021-09-27 PROCEDURE — 85027 COMPLETE CBC AUTOMATED: CPT | Performed by: FAMILY MEDICINE

## 2021-09-27 PROCEDURE — 36415 COLL VENOUS BLD VENIPUNCTURE: CPT | Performed by: FAMILY MEDICINE

## 2021-09-27 PROCEDURE — 84146 ASSAY OF PROLACTIN: CPT | Performed by: FAMILY MEDICINE

## 2021-10-12 ENCOUNTER — OFFICE VISIT (OUTPATIENT)
Dept: FAMILY MEDICINE | Facility: CLINIC | Age: 51
End: 2021-10-12
Payer: COMMERCIAL

## 2021-10-12 VITALS
BODY MASS INDEX: 32.64 KG/M2 | WEIGHT: 241 LBS | HEIGHT: 72 IN | OXYGEN SATURATION: 98 % | SYSTOLIC BLOOD PRESSURE: 112 MMHG | RESPIRATION RATE: 16 BRPM | HEART RATE: 94 BPM | DIASTOLIC BLOOD PRESSURE: 70 MMHG | TEMPERATURE: 97.6 F

## 2021-10-12 DIAGNOSIS — F25.0 SCHIZOAFFECTIVE DISORDER, BIPOLAR TYPE (H): Primary | ICD-10-CM

## 2021-10-12 DIAGNOSIS — R00.0 SINUS TACHYCARDIA: ICD-10-CM

## 2021-10-12 DIAGNOSIS — F12.11 MARIJUANA ABUSE IN REMISSION: ICD-10-CM

## 2021-10-12 PROCEDURE — 96372 THER/PROPH/DIAG INJ SC/IM: CPT | Performed by: FAMILY MEDICINE

## 2021-10-12 PROCEDURE — 99213 OFFICE O/P EST LOW 20 MIN: CPT | Mod: 25 | Performed by: FAMILY MEDICINE

## 2021-10-12 ASSESSMENT — MIFFLIN-ST. JEOR: SCORE: 1976.17

## 2021-10-12 NOTE — PROGRESS NOTES
ASSESMENT AND PLAN:  Diagnoses and all orders for this visit:  Schizoaffective disorder, bipolar type (H)  Stable. Continue psychiatric follow-up. Continue to get his monthly Invega injections here at the clinic, 1 given today.  Marijuana abuse in remission recently with recent recurrence of use as detailed below  Counseling done with the patient, encouraged to reestablish his abstinence.  Sinus tachycardia  Improved with transition to propanolol. Continue propanolol daily.    Reviewed the risks and benefits of the treatment plan with the patient and/or caregiver and we discussed indications for routine and emergent follow-up.        SUBJECTIVE: 51-year-old male in for follow-up.  No hallucinations.  He has acknowledged that he has started using some marijuana again, currently 2-3 times per week.  When he first started propanolol after his last visit, he had some mild lightheadedness that lasted for a few days and then resolved.  He continues to take the propanolol.    No past medical history on file.  Patient Active Problem List   Diagnosis     Bipolar affective disorder (H)     Primary insomnia     Alcohol abuse     Anxiety     Schizoaffective disorder, bipolar type (H)     Marijuana abuse in remission     Sinus tachycardia     Hypertriglyceridemia     Current Outpatient Medications   Medication Sig Dispense Refill     divalproex (DEPAKOTE ER) 500 MG 24 hour tablet [DIVALPROEX (DEPAKOTE ER) 500 MG 24 HOUR TABLET] Take 500 mg by mouth at bedtime.       omeprazole (PRILOSEC) 40 MG capsule [OMEPRAZOLE (PRILOSEC) 40 MG CAPSULE] TAKE ONE CAPSULE BY MOUTH ONE TIME DAILY  90 capsule 3     propranolol ER (INDERAL LA) 80 MG 24 hr capsule Take 1 capsule (80 mg) by mouth daily 30 capsule 11     QUEtiapine (SEROQUEL) 200 MG tablet [QUETIAPINE (SEROQUEL) 200 MG TABLET] Take 200 mg by mouth at bedtime. Take with 300 mg tablet for total of 500 mg at bedtime       QUEtiapine (SEROQUEL) 300 MG tablet [QUETIAPINE (SEROQUEL) 300 MG  TABLET] Take 300 mg by mouth at bedtime. Take with 200 mg for total of 500 mg nightly       History   Smoking Status     Never Smoker   Smokeless Tobacco     Never Used     Comment: no passive exposure       OBJECTICE: /70 (BP Location: Right arm)   Pulse 94   Temp 97.6  F (36.4  C) (Oral)   Resp 16   Ht 1.829 m (6')   Wt 109.3 kg (241 lb)   SpO2 98%   BMI 32.69 kg/m       No results found for this or any previous visit (from the past 24 hour(s)).     CV-regular rate and rhythm with no murmur   RESP-lungs clear to auscultation   Psychiatric-appearance is well-groomed, speech is slightly pressured, mood is not depressed, affect is normal, thought content negative for suicidal or homicidal ideation, thought processing negative for paranoid or delusional thinking.    Telly Arias MD

## 2021-11-12 ENCOUNTER — ALLIED HEALTH/NURSE VISIT (OUTPATIENT)
Dept: FAMILY MEDICINE | Facility: CLINIC | Age: 51
End: 2021-11-12
Payer: COMMERCIAL

## 2021-11-12 DIAGNOSIS — F25.0 SCHIZOAFFECTIVE DISORDER, BIPOLAR TYPE (H): Primary | ICD-10-CM

## 2021-11-12 PROCEDURE — 96372 THER/PROPH/DIAG INJ SC/IM: CPT | Performed by: FAMILY MEDICINE

## 2021-11-12 PROCEDURE — 99207 PR NO CHARGE NURSE ONLY: CPT

## 2021-12-13 ENCOUNTER — OFFICE VISIT (OUTPATIENT)
Dept: FAMILY MEDICINE | Facility: CLINIC | Age: 51
End: 2021-12-13
Payer: COMMERCIAL

## 2021-12-13 VITALS
RESPIRATION RATE: 16 BRPM | OXYGEN SATURATION: 99 % | SYSTOLIC BLOOD PRESSURE: 98 MMHG | BODY MASS INDEX: 31.69 KG/M2 | WEIGHT: 234 LBS | DIASTOLIC BLOOD PRESSURE: 64 MMHG | TEMPERATURE: 97.5 F | HEART RATE: 85 BPM | HEIGHT: 72 IN

## 2021-12-13 DIAGNOSIS — R00.0 SINUS TACHYCARDIA: ICD-10-CM

## 2021-12-13 DIAGNOSIS — F12.11 MARIJUANA ABUSE IN REMISSION: ICD-10-CM

## 2021-12-13 DIAGNOSIS — F25.0 SCHIZOAFFECTIVE DISORDER, BIPOLAR TYPE (H): Primary | ICD-10-CM

## 2021-12-13 PROCEDURE — 99213 OFFICE O/P EST LOW 20 MIN: CPT | Mod: 25 | Performed by: FAMILY MEDICINE

## 2021-12-13 PROCEDURE — 96372 THER/PROPH/DIAG INJ SC/IM: CPT | Performed by: FAMILY MEDICINE

## 2021-12-13 ASSESSMENT — MIFFLIN-ST. JEOR: SCORE: 1944.42

## 2021-12-14 NOTE — PROGRESS NOTES
ASSESMENT AND PLAN:  Diagnoses and all orders for this visit:  Schizoaffective disorder, bipolar type (H)  Sinus tachycardia  Marijuana abuse in remission    Patient is currently well controlled on all 3 of these issues.  Continue monthly Invega.  Continue propanolol.  Congratulated on his ongoing sobriety and he was strongly encouraged to continue with complete abstinence from marijuana.  Follow-up in 1 month for nurse only visit for his injection and then again with me for a physician visit and reevaluation in 2 months.    Reviewed the risks and benefits of the treatment plan with the patient and/or caregiver and we discussed indications for routine and emergent follow-up.        SUBJECTIVE: 51-year-old male here for follow-up.  He has had no recurrence of hallucinations or the tingling sensations that had been very bothersome to him previously.  He feels his mood has been stable.  He has been completely abstinent from marijuana for the last 2 weeks.  Prior to that he had cut down significantly.  He is hoping to maintain complete abstinence going forward.    No past medical history on file.  Patient Active Problem List   Diagnosis     Bipolar affective disorder (H)     Primary insomnia     Alcohol abuse     Anxiety     Schizoaffective disorder, bipolar type (H)     Marijuana abuse in remission     Sinus tachycardia     Hypertriglyceridemia     Current Outpatient Medications   Medication Sig Dispense Refill     divalproex (DEPAKOTE ER) 500 MG 24 hour tablet [DIVALPROEX (DEPAKOTE ER) 500 MG 24 HOUR TABLET] Take 500 mg by mouth at bedtime.       omeprazole (PRILOSEC) 40 MG capsule [OMEPRAZOLE (PRILOSEC) 40 MG CAPSULE] TAKE ONE CAPSULE BY MOUTH ONE TIME DAILY  90 capsule 3     propranolol ER (INDERAL LA) 80 MG 24 hr capsule Take 1 capsule (80 mg) by mouth daily 30 capsule 11     QUEtiapine (SEROQUEL) 200 MG tablet [QUETIAPINE (SEROQUEL) 200 MG TABLET] Take 200 mg by mouth at bedtime. Take with 300 mg tablet for total  of 500 mg at bedtime       QUEtiapine (SEROQUEL) 300 MG tablet [QUETIAPINE (SEROQUEL) 300 MG TABLET] Take 300 mg by mouth at bedtime. Take with 200 mg for total of 500 mg nightly       History   Smoking Status     Never Smoker   Smokeless Tobacco     Never Used     Comment: no passive exposure       OBJECTICE: BP 98/64 (BP Location: Right arm)   Pulse 85   Temp 97.5  F (36.4  C) (Oral)   Resp 16   Ht 1.829 m (6')   Wt 106.1 kg (234 lb)   SpO2 99%   BMI 31.74 kg/m       No results found for this or any previous visit (from the past 24 hour(s)).     CV-Regular rate and rhythm with no murmur    RESP-lungs clear to auscultation    Psychiatric-appearance is well-groomed, speech is slightly pressured, mood is not depressed, affect is normal, thought content negative for suicidal or homicidal ideation, thought processing negative for paranoid or delusional thinking.    Telly Arias MD

## 2022-01-13 ENCOUNTER — ALLIED HEALTH/NURSE VISIT (OUTPATIENT)
Dept: FAMILY MEDICINE | Facility: CLINIC | Age: 52
End: 2022-01-13
Payer: COMMERCIAL

## 2022-01-13 DIAGNOSIS — F25.0 SCHIZOAFFECTIVE DISORDER, BIPOLAR TYPE (H): Primary | ICD-10-CM

## 2022-01-13 PROCEDURE — 96372 THER/PROPH/DIAG INJ SC/IM: CPT | Performed by: FAMILY MEDICINE

## 2022-01-13 PROCEDURE — 99207 PR NO CHARGE NURSE ONLY: CPT

## 2022-02-14 ENCOUNTER — OFFICE VISIT (OUTPATIENT)
Dept: FAMILY MEDICINE | Facility: CLINIC | Age: 52
End: 2022-02-14
Payer: COMMERCIAL

## 2022-02-14 ENCOUNTER — TRANSFERRED RECORDS (OUTPATIENT)
Dept: HEALTH INFORMATION MANAGEMENT | Facility: CLINIC | Age: 52
End: 2022-02-14

## 2022-02-14 VITALS
WEIGHT: 233 LBS | HEART RATE: 88 BPM | RESPIRATION RATE: 20 BRPM | DIASTOLIC BLOOD PRESSURE: 70 MMHG | BODY MASS INDEX: 31.56 KG/M2 | SYSTOLIC BLOOD PRESSURE: 98 MMHG | TEMPERATURE: 97.9 F | HEIGHT: 72 IN | OXYGEN SATURATION: 98 %

## 2022-02-14 DIAGNOSIS — F19.10 DRUG ABUSE (H): ICD-10-CM

## 2022-02-14 DIAGNOSIS — F25.0 SCHIZOAFFECTIVE DISORDER, BIPOLAR TYPE (H): Primary | ICD-10-CM

## 2022-02-14 DIAGNOSIS — R63.5 ABNORMAL WEIGHT GAIN: ICD-10-CM

## 2022-02-14 DIAGNOSIS — Z79.899 HIGH RISK MEDICATIONS (NOT ANTICOAGULANTS) LONG-TERM USE: ICD-10-CM

## 2022-02-14 DIAGNOSIS — N52.9 ERECTILE DYSFUNCTION, UNSPECIFIED ERECTILE DYSFUNCTION TYPE: ICD-10-CM

## 2022-02-14 DIAGNOSIS — F31.12 BIPOLAR I DISORDER, MOST RECENT EPISODE (OR CURRENT) MANIC, MODERATE (H): ICD-10-CM

## 2022-02-14 DIAGNOSIS — F12.11 MARIJUANA ABUSE IN REMISSION: ICD-10-CM

## 2022-02-14 LAB
ALBUMIN SERPL-MCNC: 3.6 G/DL (ref 3.5–5)
ALP SERPL-CCNC: 96 U/L (ref 45–120)
ALT SERPL W P-5'-P-CCNC: 23 U/L (ref 0–45)
AMYLASE SERPL-CCNC: 47 U/L (ref 5–120)
AST SERPL W P-5'-P-CCNC: 20 U/L (ref 0–40)
BASOPHILS # BLD AUTO: 0.1 10E3/UL (ref 0–0.2)
BASOPHILS NFR BLD AUTO: 1 %
BILIRUB DIRECT SERPL-MCNC: 0.2 MG/DL
BILIRUB SERPL-MCNC: 0.8 MG/DL (ref 0–1)
CHOLEST SERPL-MCNC: 221 MG/DL
EOSINOPHIL # BLD AUTO: 0.3 10E3/UL (ref 0–0.7)
EOSINOPHIL NFR BLD AUTO: 4 %
ERYTHROCYTE [DISTWIDTH] IN BLOOD BY AUTOMATED COUNT: 14 % (ref 10–15)
FASTING STATUS PATIENT QL REPORTED: NO
FASTING STATUS PATIENT QL REPORTED: NO
GLUCOSE BLD-MCNC: 107 MG/DL (ref 70–125)
HBA1C MFR BLD: 5.3 % (ref 0–5.6)
HCT VFR BLD AUTO: 43.5 % (ref 40–53)
HDLC SERPL-MCNC: 36 MG/DL
HGB BLD-MCNC: 14.8 G/DL (ref 13.3–17.7)
IMM GRANULOCYTES # BLD: 0 10E3/UL
IMM GRANULOCYTES NFR BLD: 0 %
LDLC SERPL CALC-MCNC: 120 MG/DL
LDLC SERPL CALC-MCNC: ABNORMAL MG/DL
LYMPHOCYTES # BLD AUTO: 3.2 10E3/UL (ref 0.8–5.3)
LYMPHOCYTES NFR BLD AUTO: 39 %
MCH RBC QN AUTO: 31.2 PG (ref 26.5–33)
MCHC RBC AUTO-ENTMCNC: 34 G/DL (ref 31.5–36.5)
MCV RBC AUTO: 92 FL (ref 78–100)
MONOCYTES # BLD AUTO: 0.7 10E3/UL (ref 0–1.3)
MONOCYTES NFR BLD AUTO: 8 %
NEUTROPHILS # BLD AUTO: 3.9 10E3/UL (ref 1.6–8.3)
NEUTROPHILS NFR BLD AUTO: 48 %
PLATELET # BLD AUTO: 290 10E3/UL (ref 150–450)
PROLACTIN SERPL-MCNC: 55 NG/ML (ref 0–15)
PROT SERPL-MCNC: 7.1 G/DL (ref 6–8)
RBC # BLD AUTO: 4.74 10E6/UL (ref 4.4–5.9)
TRIGL SERPL-MCNC: 524 MG/DL
TSH SERPL DL<=0.005 MIU/L-ACNC: 1.6 UIU/ML (ref 0.3–5)
WBC # BLD AUTO: 8.1 10E3/UL (ref 4–11)

## 2022-02-14 PROCEDURE — 36415 COLL VENOUS BLD VENIPUNCTURE: CPT | Performed by: FAMILY MEDICINE

## 2022-02-14 PROCEDURE — 82150 ASSAY OF AMYLASE: CPT | Performed by: FAMILY MEDICINE

## 2022-02-14 PROCEDURE — 80061 LIPID PANEL: CPT | Performed by: FAMILY MEDICINE

## 2022-02-14 PROCEDURE — 93005 ELECTROCARDIOGRAM TRACING: CPT | Performed by: FAMILY MEDICINE

## 2022-02-14 PROCEDURE — 93010 ELECTROCARDIOGRAM REPORT: CPT | Performed by: INTERNAL MEDICINE

## 2022-02-14 PROCEDURE — 96372 THER/PROPH/DIAG INJ SC/IM: CPT | Performed by: FAMILY MEDICINE

## 2022-02-14 PROCEDURE — 82947 ASSAY GLUCOSE BLOOD QUANT: CPT | Performed by: FAMILY MEDICINE

## 2022-02-14 PROCEDURE — 83036 HEMOGLOBIN GLYCOSYLATED A1C: CPT | Performed by: FAMILY MEDICINE

## 2022-02-14 PROCEDURE — 99214 OFFICE O/P EST MOD 30 MIN: CPT | Mod: 25 | Performed by: FAMILY MEDICINE

## 2022-02-14 PROCEDURE — 84146 ASSAY OF PROLACTIN: CPT | Performed by: FAMILY MEDICINE

## 2022-02-14 PROCEDURE — 83721 ASSAY OF BLOOD LIPOPROTEIN: CPT | Mod: 59 | Performed by: FAMILY MEDICINE

## 2022-02-14 PROCEDURE — 80076 HEPATIC FUNCTION PANEL: CPT | Performed by: FAMILY MEDICINE

## 2022-02-14 PROCEDURE — 85025 COMPLETE CBC W/AUTO DIFF WBC: CPT | Performed by: FAMILY MEDICINE

## 2022-02-14 PROCEDURE — 84443 ASSAY THYROID STIM HORMONE: CPT | Performed by: FAMILY MEDICINE

## 2022-02-14 ASSESSMENT — MIFFLIN-ST. JEOR: SCORE: 1939.88

## 2022-02-14 NOTE — PROGRESS NOTES
ASSESMENT AND PLAN:  Diagnoses and all orders for this visit:  Schizoaffective disorder, bipolar type (H)  Stable.  Well-controlled with monthly Invega which will be continued as prescribed below.  Injection given today in the clinic.  Follow-up again in 1 month for nurse only for his next injection and then in 2 months again to see me.  He will also continue his follow-up with his psychiatrist at [facility].  Labs and EKG being done today because of his ongoing use of high-risk medications per the orders from [facility].  I did encourage the patient to follow-up specifically with his psychiatrist about his recent decision to discontinue Depakote.  -     paliperidone (INVEGA SUSTENNA) injection JEANE 156 mg  -     EKG 12-lead, tracing only done today in clinic and reviewed by me personally is normal sinus rhythm, normal EKG, will fax to [facility] clinic.  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  Counseled on cessation.  Erectile dysfunction, unspecified erectile dysfunction type, possible urethral bleeding  -     TSH with free T4 reflex; Future  Discussed options with the patient, I strongly encouraged him to follow-up with his urologist on this concern as well as the recurrence of some intermittent urethral bleeding.  I gave him the name and phone number of his urology office and urologist and strongly encouraged him to call to schedule a follow-up appointment.  Abnormal weight gain   -     TSH with free T4 reflex; Future  High risk medications (not anticoagulants) long-term use  -     Prolactin  -     Glucose  -     Hemoglobin A1c  -     Lipid panel reflex to direct LDL Fasting        Reviewed the risks and benefits of the treatment plan with the patient and/or caregiver and we discussed indications for routine and emergent follow-up.        SUBJECTIVE: 51-year-old male with a history of chronic schizoaffective disorder,  bipolar type.  Patient reports that his mood has been stable over this last month even though he stopped taking Depakote.  He reports he stopped taking it about a month ago because he is not allowed to donate plasma if he if he is on Depakote for some reason.  Stopping this medication allows him to donate plasma which gives him the extra income that he needs to get by.  Since stopping the medication he has not noticed any new issues or problems.  Patient does acknowledge some relapse on his marijuana abuse.  He reports that he is used marijuana 7 times over the last couple of months.  He has been going through some increased stress recently having broken off his relationship with a long-term girlfriend.  Patient has a history of erectile dysfunction, has been evaluated by urology in the past, reports that the Viagra medication is no longer working for him and he is interested in trying alternative therapies.  Patient has had some intermittent episodes of spotting of blood in the underwear apparently coming from the urethra.  He is not having gross hematuria or pink-colored urine.  The episodes seem to come and go, and did occur again over this last couple of weeks.  Patient has gained weight and acknowledges that he is not exercising regularly.    No past medical history on file.  Patient Active Problem List   Diagnosis     Bipolar affective disorder (H)     Primary insomnia     Alcohol abuse     Anxiety     Schizoaffective disorder, bipolar type (H)     Marijuana abuse in remission     Sinus tachycardia     Hypertriglyceridemia     Erectile dysfunction, unspecified erectile dysfunction type     Drug abuse (H)     Current Outpatient Medications   Medication Sig Dispense Refill     omeprazole (PRILOSEC) 40 MG capsule [OMEPRAZOLE (PRILOSEC) 40 MG CAPSULE] TAKE ONE CAPSULE BY MOUTH ONE TIME DAILY  90 capsule 3     propranolol ER (INDERAL LA) 80 MG 24 hr capsule Take 1 capsule (80 mg) by mouth daily 30 capsule 11      QUEtiapine (SEROQUEL) 200 MG tablet [QUETIAPINE (SEROQUEL) 200 MG TABLET] Take 200 mg by mouth at bedtime. Take with 300 mg tablet for total of 500 mg at bedtime       QUEtiapine (SEROQUEL) 300 MG tablet [QUETIAPINE (SEROQUEL) 300 MG TABLET] Take 300 mg by mouth at bedtime. Take with 200 mg for total of 500 mg nightly       History   Smoking Status     Never Smoker   Smokeless Tobacco     Never Used     Comment: no passive exposure       OBJECTICE: BP 98/70 (BP Location: Left arm)   Pulse 88   Temp 97.9  F (36.6  C) (Oral)   Resp 20   Ht 1.829 m (6')   Wt 105.7 kg (233 lb)   SpO2 98%   BMI 31.60 kg/m       Recent Results (from the past 24 hour(s))   Hemoglobin A1c    Collection Time: 02/14/22  2:19 PM   Result Value Ref Range    Hemoglobin A1C 5.3 0.0 - 5.6 %   EKG 12-lead, tracing only    Collection Time: 02/14/22  3:16 PM   Result Value Ref Range    Systolic Blood Pressure  mmHg    Diastolic Blood Pressure  mmHg    Ventricular Rate 65 BPM    Atrial Rate 65 BPM    LA Interval 146 ms    QRS Duration 86 ms     ms    QTc 420 ms    P Axis 35 degrees    R AXIS 29 degrees    T Axis 53 degrees    Interpretation ECG       Sinus rhythm  Normal ECG  When compared with ECG of 06-JAN-2021 12:05,  Vent. rate has decreased BY  45 BPM          GEN-alert, appropriate, in no acute distress   CV-regular rate and rhythm with no murmur   RESP-lungs clear to auscultation   Genitourinary-normal testicular exam bilaterally.  Normal penis.   Psychiatric-speech is mildly pressured, mood is not depressed, affect is normal, thought content negative for suicidal or homicidal ideation.    Telly Arias MD

## 2022-02-15 LAB — SCANNED LAB RESULT: ABNORMAL

## 2022-02-15 PROCEDURE — 80165 DIPROPYLACETIC ACID FREE: CPT | Mod: 90 | Performed by: FAMILY MEDICINE

## 2022-02-15 PROCEDURE — 99000 SPECIMEN HANDLING OFFICE-LAB: CPT | Performed by: FAMILY MEDICINE

## 2022-02-22 ENCOUNTER — VIRTUAL VISIT (OUTPATIENT)
Dept: FAMILY MEDICINE | Facility: CLINIC | Age: 52
End: 2022-02-22
Payer: COMMERCIAL

## 2022-02-22 DIAGNOSIS — N52.9 ERECTILE DYSFUNCTION, UNSPECIFIED ERECTILE DYSFUNCTION TYPE: ICD-10-CM

## 2022-02-22 DIAGNOSIS — E78.1 HYPERTRIGLYCERIDEMIA: Primary | ICD-10-CM

## 2022-02-22 DIAGNOSIS — F25.0 SCHIZOAFFECTIVE DISORDER, BIPOLAR TYPE (H): ICD-10-CM

## 2022-02-22 LAB
ATRIAL RATE - MUSE: 65 BPM
DIASTOLIC BLOOD PRESSURE - MUSE: NORMAL MMHG
INTERPRETATION ECG - MUSE: NORMAL
P AXIS - MUSE: 35 DEGREES
PR INTERVAL - MUSE: 146 MS
QRS DURATION - MUSE: 86 MS
QT - MUSE: 404 MS
QTC - MUSE: 420 MS
R AXIS - MUSE: 29 DEGREES
SYSTOLIC BLOOD PRESSURE - MUSE: NORMAL MMHG
T AXIS - MUSE: 53 DEGREES
VENTRICULAR RATE- MUSE: 65 BPM

## 2022-02-22 PROCEDURE — 99213 OFFICE O/P EST LOW 20 MIN: CPT | Mod: 95 | Performed by: FAMILY MEDICINE

## 2022-02-22 NOTE — PROGRESS NOTES
ASSESMENT AND PLAN:  Diagnoses and all orders for this visit:  Hypertriglyceridemia  Likely the significant elevation in his triglyceride level on his last blood work was because he was nonfasting for that sample.  We will have him back for a fasting sample when he comes in for his next injection as detailed below.  Counseled the patient on healthy eating and regular walking and other exercise.  -     Lipid panel reflex to direct LDL Fasting; Future  Erectile dysfunction, unspecified erectile dysfunction type, patient has also had some intermittent blood from the urethra.  See previous notes for details.  Patient has not yet scheduled with his urologist.  I strongly encouraged him to do so today.  I again gave him the phone number today so that he can call and get that evaluation scheduled.  Schizoaffective disorder, bipolar type (H)  Stable.  Continue monthly Invega injections here at the clinic and also continue his follow-up with his psychiatrist at [facility].  He is due for his next injection here on March 14 and we will plan to do the blood work mentioned above at the same time.    Reviewed the risks and benefits of the treatment plan with the patient and/or caregiver and we discussed indications for routine and emergent follow-up.    Duration of telephone call: 12 minutes    SUBJECTIVE: 51-year-old male with a phone visit to follow-up on elevated triglyceride level that came back on his labs that have been ordered by his psychiatrist.  His psychiatrist directed him to follow-up with his primary care physician about the elevated triglyceride levels.  There was a big jump compared to last year, level now above 500.  However, patient reports that he was not fasting for this sample and had been fasting for the previous sample.  Patient also continues to be concerned about difficulty maintaining and achieving erection.  Patient has had some intermittent blood discharge from the urethra, see previous notes for  details.  Unfortunately, despite our discussion last time as documented in the previous clinic note, he has not yet followed up with urology.    No past medical history on file.  Patient Active Problem List   Diagnosis     Bipolar affective disorder (H)     Primary insomnia     Alcohol abuse     Anxiety     Schizoaffective disorder, bipolar type (H)     Marijuana abuse in remission     Sinus tachycardia     Hypertriglyceridemia     Erectile dysfunction, unspecified erectile dysfunction type     Drug abuse (H)     Current Outpatient Medications   Medication Sig Dispense Refill     omeprazole (PRILOSEC) 40 MG capsule [OMEPRAZOLE (PRILOSEC) 40 MG CAPSULE] TAKE ONE CAPSULE BY MOUTH ONE TIME DAILY  90 capsule 3     propranolol ER (INDERAL LA) 80 MG 24 hr capsule Take 1 capsule (80 mg) by mouth daily 30 capsule 11     QUEtiapine (SEROQUEL) 200 MG tablet [QUETIAPINE (SEROQUEL) 200 MG TABLET] Take 200 mg by mouth at bedtime. Take with 300 mg tablet for total of 500 mg at bedtime       QUEtiapine (SEROQUEL) 300 MG tablet [QUETIAPINE (SEROQUEL) 300 MG TABLET] Take 300 mg by mouth at bedtime. Take with 200 mg for total of 500 mg nightly       History   Smoking Status     Never Smoker   Smokeless Tobacco     Never Used     Comment: no passive exposure       OBJECTICE: There were no vitals taken for this visit.     No results found for this or any previous visit (from the past 24 hour(s)).         Telly Arias MD

## 2022-03-02 ENCOUNTER — TRANSFERRED RECORDS (OUTPATIENT)
Dept: HEALTH INFORMATION MANAGEMENT | Facility: CLINIC | Age: 52
End: 2022-03-02
Payer: COMMERCIAL

## 2022-03-10 DIAGNOSIS — K21.9 GERD (GASTROESOPHAGEAL REFLUX DISEASE): ICD-10-CM

## 2022-03-10 DIAGNOSIS — Z76.0 ENCOUNTER FOR MEDICATION REFILL: Primary | ICD-10-CM

## 2022-03-10 RX ORDER — OMEPRAZOLE 40 MG/1
CAPSULE, DELAYED RELEASE ORAL
Qty: 90 CAPSULE | Refills: 3 | Status: SHIPPED | OUTPATIENT
Start: 2022-03-10 | End: 2023-05-28

## 2022-04-13 ENCOUNTER — ALLIED HEALTH/NURSE VISIT (OUTPATIENT)
Dept: FAMILY MEDICINE | Facility: CLINIC | Age: 52
End: 2022-04-13
Payer: COMMERCIAL

## 2022-04-13 DIAGNOSIS — F25.0 SCHIZOAFFECTIVE DISORDER, BIPOLAR TYPE (H): Primary | ICD-10-CM

## 2022-04-13 PROCEDURE — 96372 THER/PROPH/DIAG INJ SC/IM: CPT | Performed by: FAMILY MEDICINE

## 2022-04-13 PROCEDURE — 99207 PR NO CHARGE NURSE ONLY: CPT

## 2022-05-20 ENCOUNTER — ALLIED HEALTH/NURSE VISIT (OUTPATIENT)
Dept: FAMILY MEDICINE | Facility: CLINIC | Age: 52
End: 2022-05-20
Payer: COMMERCIAL

## 2022-05-20 DIAGNOSIS — F25.0 SCHIZOAFFECTIVE DISORDER, BIPOLAR TYPE (H): Primary | ICD-10-CM

## 2022-05-20 PROCEDURE — 99207 PR NO CHARGE NURSE ONLY: CPT

## 2022-05-20 PROCEDURE — 96372 THER/PROPH/DIAG INJ SC/IM: CPT | Performed by: FAMILY MEDICINE

## 2022-06-21 ENCOUNTER — ALLIED HEALTH/NURSE VISIT (OUTPATIENT)
Dept: FAMILY MEDICINE | Facility: CLINIC | Age: 52
End: 2022-06-21
Payer: COMMERCIAL

## 2022-06-21 DIAGNOSIS — F25.0 SCHIZOAFFECTIVE DISORDER, BIPOLAR TYPE (H): Primary | ICD-10-CM

## 2022-06-21 PROCEDURE — 96372 THER/PROPH/DIAG INJ SC/IM: CPT | Performed by: FAMILY MEDICINE

## 2022-06-21 PROCEDURE — 99207 PR NO CHARGE NURSE ONLY: CPT

## 2022-07-22 ENCOUNTER — ALLIED HEALTH/NURSE VISIT (OUTPATIENT)
Dept: FAMILY MEDICINE | Facility: CLINIC | Age: 52
End: 2022-07-22
Payer: COMMERCIAL

## 2022-07-22 DIAGNOSIS — F25.0 SCHIZOAFFECTIVE DISORDER, BIPOLAR TYPE (H): Primary | ICD-10-CM

## 2022-07-22 PROCEDURE — 99207 PR NO CHARGE NURSE ONLY: CPT

## 2022-07-22 PROCEDURE — 96372 THER/PROPH/DIAG INJ SC/IM: CPT | Performed by: FAMILY MEDICINE

## 2022-08-22 ENCOUNTER — ALLIED HEALTH/NURSE VISIT (OUTPATIENT)
Dept: FAMILY MEDICINE | Facility: CLINIC | Age: 52
End: 2022-08-22
Payer: COMMERCIAL

## 2022-08-22 DIAGNOSIS — F25.0 SCHIZOAFFECTIVE DISORDER, BIPOLAR TYPE (H): Primary | ICD-10-CM

## 2022-08-22 PROCEDURE — 96372 THER/PROPH/DIAG INJ SC/IM: CPT | Performed by: FAMILY MEDICINE

## 2022-08-22 PROCEDURE — 99207 PR NO CHARGE NURSE ONLY: CPT

## 2022-09-21 ENCOUNTER — ALLIED HEALTH/NURSE VISIT (OUTPATIENT)
Dept: FAMILY MEDICINE | Facility: CLINIC | Age: 52
End: 2022-09-21
Payer: COMMERCIAL

## 2022-09-21 DIAGNOSIS — F25.0 SCHIZOAFFECTIVE DISORDER, BIPOLAR TYPE (H): Primary | ICD-10-CM

## 2022-09-21 PROCEDURE — 96372 THER/PROPH/DIAG INJ SC/IM: CPT | Performed by: FAMILY MEDICINE

## 2022-09-21 PROCEDURE — 99207 PR NO CHARGE NURSE ONLY: CPT

## 2022-10-11 ENCOUNTER — MEDICAL CORRESPONDENCE (OUTPATIENT)
Dept: HEALTH INFORMATION MANAGEMENT | Facility: CLINIC | Age: 52
End: 2022-10-11

## 2022-10-11 ENCOUNTER — OFFICE VISIT (OUTPATIENT)
Dept: FAMILY MEDICINE | Facility: CLINIC | Age: 52
End: 2022-10-11
Payer: COMMERCIAL

## 2022-10-11 ENCOUNTER — TELEPHONE (OUTPATIENT)
Dept: FAMILY MEDICINE | Facility: CLINIC | Age: 52
End: 2022-10-11

## 2022-10-11 VITALS
OXYGEN SATURATION: 97 % | HEART RATE: 56 BPM | DIASTOLIC BLOOD PRESSURE: 64 MMHG | TEMPERATURE: 98 F | BODY MASS INDEX: 27.82 KG/M2 | RESPIRATION RATE: 14 BRPM | SYSTOLIC BLOOD PRESSURE: 88 MMHG | WEIGHT: 205.4 LBS | HEIGHT: 72 IN

## 2022-10-11 DIAGNOSIS — F41.9 ANXIETY: ICD-10-CM

## 2022-10-11 DIAGNOSIS — Z23 NEED FOR IMMUNIZATION AGAINST INFLUENZA: ICD-10-CM

## 2022-10-11 DIAGNOSIS — R00.0 SINUS TACHYCARDIA: ICD-10-CM

## 2022-10-11 DIAGNOSIS — Z23 NEED FOR VACCINATION: ICD-10-CM

## 2022-10-11 DIAGNOSIS — I63.511 ACUTE RIGHT MCA STROKE (H): Primary | ICD-10-CM

## 2022-10-11 DIAGNOSIS — Z23 HIGH PRIORITY FOR 2019 NOVEL CORONAVIRUS VACCINATION: ICD-10-CM

## 2022-10-11 PROCEDURE — 99214 OFFICE O/P EST MOD 30 MIN: CPT | Mod: 25 | Performed by: FAMILY MEDICINE

## 2022-10-11 PROCEDURE — G0008 ADMIN INFLUENZA VIRUS VAC: HCPCS | Mod: 59 | Performed by: FAMILY MEDICINE

## 2022-10-11 PROCEDURE — 90682 RIV4 VACC RECOMBINANT DNA IM: CPT | Performed by: FAMILY MEDICINE

## 2022-10-11 PROCEDURE — 0134A COVID-19,PF,MODERNA BIVALENT: CPT | Performed by: FAMILY MEDICINE

## 2022-10-11 PROCEDURE — 91313 COVID-19,PF,MODERNA BIVALENT: CPT | Performed by: FAMILY MEDICINE

## 2022-10-11 RX ORDER — ASPIRIN 81 MG
TABLET,CHEWABLE ORAL
Qty: 90 TABLET | Refills: 3 | Status: SHIPPED | OUTPATIENT
Start: 2022-10-11 | End: 2022-11-18

## 2022-10-11 RX ORDER — ASPIRIN 81 MG
TABLET,CHEWABLE ORAL
COMMUNITY
Start: 2022-09-13 | End: 2022-10-11

## 2022-10-11 RX ORDER — PROPRANOLOL HCL 60 MG
60 CAPSULE, EXTENDED RELEASE 24HR ORAL DAILY
Qty: 90 CAPSULE | Refills: 3 | Status: SHIPPED | OUTPATIENT
Start: 2022-10-11 | End: 2024-02-07

## 2022-10-11 RX ORDER — ATORVASTATIN CALCIUM 80 MG/1
80 TABLET, FILM COATED ORAL EVERY EVENING
Qty: 90 TABLET | Refills: 3 | Status: SHIPPED | OUTPATIENT
Start: 2022-10-11 | End: 2023-11-03

## 2022-10-11 RX ORDER — ATORVASTATIN CALCIUM 80 MG/1
80 TABLET, FILM COATED ORAL EVERY EVENING
COMMUNITY
Start: 2022-09-13 | End: 2022-10-11

## 2022-10-11 ASSESSMENT — PATIENT HEALTH QUESTIONNAIRE - PHQ9
SUM OF ALL RESPONSES TO PHQ QUESTIONS 1-9: 0
10. IF YOU CHECKED OFF ANY PROBLEMS, HOW DIFFICULT HAVE THESE PROBLEMS MADE IT FOR YOU TO DO YOUR WORK, TAKE CARE OF THINGS AT HOME, OR GET ALONG WITH OTHER PEOPLE: NOT DIFFICULT AT ALL
SUM OF ALL RESPONSES TO PHQ QUESTIONS 1-9: 0

## 2022-10-11 NOTE — TELEPHONE ENCOUNTER
"Dr. Arias- pharmacy wants clarification on Propranolol.     \"Faby wanted to know if med should be order for propranolol ER or should it be just regular propranolol as pt last fill in February with one that is not ER and order by a different provider\"    Rj Johnson Cem Say, BSN RN  [facility]    "

## 2022-10-11 NOTE — PROGRESS NOTES
ASSESMENT AND PLAN:  Diagnoses and all orders for this visit:  Acute right MCA stroke (H)  I instructed the patient's to do some twice daily exercise regimen with upper and lower extremity exercises that I demonstrated for him today.  We can check to see if all of his annual physical therapy benefits have been exhausted which I would guess that they have given the duration of his hospitalization and nursing home stay.  Reviewed the hospital discharge summary in detail today with the patient and his brother.  Unfortunately do not have any records available from the nursing home.  Based on the hospital discharge summary a full medication reconciliation and review and counseling done with refills for going forward sent as detailed below:  -     ASPIRIN LOW DOSE 81 MG chewable tablet; CHEW AND SWALLOW ONE TABLET BY MOUTH ONE TIME DAILY  -     atorvastatin (LIPITOR) 80 MG tablet; Take 1 tablet (80 mg) by mouth every evening  High priority for 2019 novel coronavirus vaccination  -     COVID-19,PF,MODERNA BIVALENT (18+YRS)  Need for vaccination-Need for immunization against influenza  -     INFLUENZA QUAD, RECOMBINANT, P-FREE (RIV4) (FLUBLOK) AGE 50-64 [FQK383]  Sinus tachycardia  -     propranolol ER (INDERAL LA) 60 MG 24 hr capsule; Take 1 capsule (60 mg) by mouth daily  Anxiety in the setting of schizoaffective disorder  Continue monthly injections of Invega.  -     propranolol ER (INDERAL LA) 60 MG 24 hr capsule; Take 1 capsule (60 mg) by mouth daily      Reviewed the risks and benefits of the treatment plan with the patient and/or caregiver and we discussed indications for routine and emergent follow-up.        SUBJECTIVE: 52-year-old male here for follow-up on a prolonged hospitalization and nursing home stay following a stroke.  He had an acute stroke of the MCA, the initial stroke was not hemorrhagic but there was some posttreatment hemorrhage as a sequelae of his interventions.  Patient has noticed slow  "improvement in his left arm and leg strength but he still struggles with basic daily mobility and basic daily cares.  He is living with his brother and cousin currently who are helping him with things like showering.  He is able to do most of his other ADLs with his own power.  Patient has been taking his medications as prescribed since discharge from the nursing home and brings his medication bottles in with him today.    No past medical history on file.  Patient Active Problem List   Diagnosis     Bipolar affective disorder (H)     Primary insomnia     Alcohol abuse     Anxiety     Schizoaffective disorder, bipolar type (H)     Marijuana abuse in remission     Sinus tachycardia     Hypertriglyceridemia     Erectile dysfunction, unspecified erectile dysfunction type     Drug abuse (H)     Acute right MCA stroke (H)     Current Outpatient Medications   Medication Sig Dispense Refill     ASPIRIN LOW DOSE 81 MG chewable tablet CHEW AND SWALLOW ONE TABLET BY MOUTH ONE TIME DAILY 90 tablet 3     atorvastatin (LIPITOR) 80 MG tablet Take 1 tablet (80 mg) by mouth every evening 90 tablet 3     omeprazole (PRILOSEC) 40 MG DR capsule TAKE 1 CAPSULE BY MOUTH ONE TIME DAILY 90 capsule 3     propranolol ER (INDERAL LA) 60 MG 24 hr capsule Take 1 capsule (60 mg) by mouth daily 90 capsule 3     QUEtiapine (SEROQUEL) 200 MG tablet [QUETIAPINE (SEROQUEL) 200 MG TABLET] Take 200 mg by mouth at bedtime. Take with 300 mg tablet for total of 500 mg at bedtime       QUEtiapine (SEROQUEL) 300 MG tablet [QUETIAPINE (SEROQUEL) 300 MG TABLET] Take 300 mg by mouth at bedtime. Take with 200 mg for total of 500 mg nightly       History   Smoking Status     Never   Smokeless Tobacco     Never       OBJECTICE: BP (!) 88/64   Pulse 56   Temp 98  F (36.7  C) (Oral)   Resp 14   Ht 1.829 m (6' 0.01\")   Wt 93.2 kg (205 lb 6.4 oz)   SpO2 97%   BMI 27.85 kg/m       No results found for this or any previous visit (from the past 24 " hour(s)).   Neurologic- left facial droop.  Mild.  Left arm and leg weakness, moderate.   CV-regular rate and rhythm   RESP-lungs clear to auscultation   EXTREM-no pitting edema of the ankle     Telly Arias MD     Answers for HPI/ROS submitted by the patient on 10/11/2022  If you checked off any problems, how difficult have these problems made it for you to do your work, take care of things at home, or get along with other people?: Not difficult at all  PHQ9 TOTAL SCORE: 0  What is the reason for your visit today? : follw up CVA

## 2022-10-11 NOTE — TELEPHONE ENCOUNTER
"Called pharmacy and really PCP's message.    \"Needs to be propranolol ER 60 mg once daily, discontinue all other propranolol orders.  Thanks.\"    Rj Johnson Cem Say, BSN RN  [facility]    "

## 2022-10-11 NOTE — TELEPHONE ENCOUNTER
General Call      Reason for Call:  Need clarification on e-script sent in today for     propranolol ER (INDERAL LA) 60 MG 24 hr capsule 90 capsule 3 10/11/2022  No   Sig - Route: Take 1 capsule (60 mg) by mouth daily - Oral         What are your questions or concerns:  Faby from [facility] pharmacy called and need clarification on script sent in for rx. Faby wanted to know if med should be order for propranolol ER or should it be just regular propranolol as pt last fill in February with one that is not ER and order by a different provider? Please call pharmacy back to confirm which one to fill.          Okay to leave a detailed message?: Yes at Other phone number:  [phone] [facility]

## 2022-10-21 ENCOUNTER — ALLIED HEALTH/NURSE VISIT (OUTPATIENT)
Dept: FAMILY MEDICINE | Facility: CLINIC | Age: 52
End: 2022-10-21
Payer: COMMERCIAL

## 2022-10-21 ENCOUNTER — TELEPHONE (OUTPATIENT)
Dept: FAMILY MEDICINE | Facility: CLINIC | Age: 52
End: 2022-10-21

## 2022-10-21 DIAGNOSIS — F25.0 SCHIZOAFFECTIVE DISORDER, BIPOLAR TYPE (H): Primary | ICD-10-CM

## 2022-10-21 PROCEDURE — 96372 THER/PROPH/DIAG INJ SC/IM: CPT | Performed by: FAMILY MEDICINE

## 2022-10-21 PROCEDURE — 99207 PR NO CHARGE NURSE ONLY: CPT

## 2022-10-21 NOTE — TELEPHONE ENCOUNTER
Patient is requesting order for Wheelchair due to Right MCA stroke . Patient has appointment on 11/22/22 please advise.        Thank you,  JOHANA Breaux

## 2022-10-27 ENCOUNTER — MEDICAL CORRESPONDENCE (OUTPATIENT)
Dept: HEALTH INFORMATION MANAGEMENT | Facility: CLINIC | Age: 52
End: 2022-10-27

## 2022-11-18 DIAGNOSIS — Z76.0 ENCOUNTER FOR MEDICATION REFILL: Primary | ICD-10-CM

## 2022-11-18 DIAGNOSIS — I63.511 ACUTE RIGHT MCA STROKE (H): ICD-10-CM

## 2022-11-18 RX ORDER — ASPIRIN 81 MG/1
TABLET, CHEWABLE ORAL
Qty: 90 TABLET | Refills: 3 | Status: SHIPPED | OUTPATIENT
Start: 2022-11-18 | End: 2023-10-09

## 2022-11-18 NOTE — TELEPHONE ENCOUNTER
Medication Question or Refill    Contacts       Type Contact Phone/Fax    11/18/2022 09:28 AM CST Phone (Incoming) Tripp Sanders (Self) [phone] (H)          What medication are you calling about (include dose and sig)?:ASA 81 mg     Controlled Substance Agreement on file:   CSA -- Patient Level:    CSA: None found at the patient level.       Who prescribed the medication?: PCP    Do you need a refill? Yes: script was written as ARTURO so no substitute can be given.  Needs to be resent as ARTURO 0 allows substitution    When did you use the medication last? unknown    Patient offered an appointment? No    Do you have any questions or concerns?  No    Preferred Pharmacy:   [id number] - [place], [place] - [address]  [address]  [place] [address]  Phone: [phone] Fax: [phone]      Okay to leave a detailed message?: Yes at Home number on file [phone] (home)    Call taken on 11/18/22 at 932 am by MEGHANN Cam

## 2022-11-22 ENCOUNTER — OFFICE VISIT (OUTPATIENT)
Dept: FAMILY MEDICINE | Facility: CLINIC | Age: 52
End: 2022-11-22
Payer: COMMERCIAL

## 2022-11-22 VITALS
BODY MASS INDEX: 27.77 KG/M2 | SYSTOLIC BLOOD PRESSURE: 98 MMHG | TEMPERATURE: 98.1 F | DIASTOLIC BLOOD PRESSURE: 52 MMHG | OXYGEN SATURATION: 99 % | RESPIRATION RATE: 16 BRPM | WEIGHT: 205 LBS | HEIGHT: 72 IN | HEART RATE: 108 BPM

## 2022-11-22 DIAGNOSIS — G81.94 LEFT HEMIPLEGIA (H): ICD-10-CM

## 2022-11-22 DIAGNOSIS — I63.511 ACUTE RIGHT MCA STROKE (H): Primary | ICD-10-CM

## 2022-11-22 DIAGNOSIS — F25.0 SCHIZOAFFECTIVE DISORDER, BIPOLAR TYPE (H): ICD-10-CM

## 2022-11-22 PROCEDURE — 99214 OFFICE O/P EST MOD 30 MIN: CPT | Mod: 25 | Performed by: FAMILY MEDICINE

## 2022-11-22 PROCEDURE — 96372 THER/PROPH/DIAG INJ SC/IM: CPT | Performed by: FAMILY MEDICINE

## 2022-11-22 NOTE — PROGRESS NOTES
ASSESMENT AND PLAN:  Diagnoses and all orders for this visit:  Acute right MCA stroke (H) with resulting left hemiplegia (H)  -     Physical Therapy Referral; Future  I gave her written order for a push wheelchair with leg supports and a written order for a PCA home assessment to try to get the family better support and taking care of the patient.  Continue medical management with aspirin, atorvastatin, propanolol.  Schizoaffective disorder, bipolar type (H)  Counseling done today with the patient.  Stable, well controlled on Invega.  Invega injection given today.  Follow-up in 1 month for his next injection with nursing staff and in 2 months again with me here in the clinic.    Reviewed the risks and benefits of the treatment plan with the patient and/or caregiver and we discussed indications for routine and emergent follow-up.        SUBJECTIVE: 52-year-old male in for follow-up.  Since last visit he has made slow progress in the strength and mobility of his left leg and left arm.  He cannot walk by himself, requires the assistance of another person or device.  He is using a wheelchair for longer distance ambulation and the 1 that they have currently is a borrowed wheelchair that does not have good leg supports.  Patient has difficulty lower elevating his left leg ever since his stroke.  His left arm and hand weakness is even more profound.  He has made some progress and the ability to move slightly at the wrist and hand but can barely move at the shoulder and elbow.  Patient has a complicated mental health history.  He reports that he has been well controlled, since I last saw him he has not had any recurrence of hallucinations.  The patient's cousin is with him today and has been his main caregiver since the patient's father passed away.  Cousin acknowledges feeling a little overwhelmed by all of the cares that he and his wife provide for the patient, currently the patient does not have any PCA support.  Patient  "requires assistance with mobility, meals, bathing, safety.    No past medical history on file.  Patient Active Problem List   Diagnosis     Bipolar affective disorder (H)     Primary insomnia     Alcohol abuse     Anxiety     Schizoaffective disorder, bipolar type (H)     Marijuana abuse in remission     Sinus tachycardia     Hypertriglyceridemia     Erectile dysfunction, unspecified erectile dysfunction type     Drug abuse (H)     Acute right MCA stroke (H)     Left hemiplegia (H)     Current Outpatient Medications   Medication Sig Dispense Refill     aspirin (ASPIRIN LOW DOSE) 81 MG chewable tablet CHEW AND SWALLOW ONE TABLET BY MOUTH ONE TIME DAILY 90 tablet 3     atorvastatin (LIPITOR) 80 MG tablet Take 1 tablet (80 mg) by mouth every evening 90 tablet 3     omeprazole (PRILOSEC) 40 MG DR capsule TAKE 1 CAPSULE BY MOUTH ONE TIME DAILY 90 capsule 3     propranolol ER (INDERAL LA) 60 MG 24 hr capsule Take 1 capsule (60 mg) by mouth daily 90 capsule 3     QUEtiapine (SEROQUEL) 200 MG tablet [QUETIAPINE (SEROQUEL) 200 MG TABLET] Take 200 mg by mouth at bedtime. Take with 300 mg tablet for total of 500 mg at bedtime       QUEtiapine (SEROQUEL) 300 MG tablet [QUETIAPINE (SEROQUEL) 300 MG TABLET] Take 300 mg by mouth at bedtime. Take with 200 mg for total of 500 mg nightly       History   Smoking Status     Never   Smokeless Tobacco     Never       OBJECTICE: BP 98/52 (BP Location: Left arm, Patient Position: Sitting, Cuff Size: Adult Regular)   Pulse 108   Temp 98.1  F (36.7  C) (Oral)   Resp 16   Ht 1.829 m (6' 0.01\")   Wt 93 kg (205 lb)   SpO2 99%   BMI 27.80 kg/m       No results found for this or any previous visit (from the past 24 hour(s)).     GEN-alert, appropriate, in no apparent distress   Neurologic- severe left-sided weakness, arm worse than leg.   CV-regular rate and rhythm   RESP-lungs clear to auscultation   Psychiatric-appearance is well-groomed, speech of normal fluency and rate, affect is " normal, thought content negative for suicidal or homicidal ideation.    Telly Arias MD

## 2022-11-25 NOTE — TELEPHONE ENCOUNTER
Called [facility] in regards Wheelchair order . Per Karla need last office note with order.

## 2022-12-14 ENCOUNTER — TELEPHONE (OUTPATIENT)
Dept: FAMILY MEDICINE | Facility: CLINIC | Age: 52
End: 2022-12-14

## 2022-12-14 NOTE — TELEPHONE ENCOUNTER
"Called pt and relayed provider's message. Pt verbalized understanding.    \"Please explain that since it has been many months since his hospitalization and he is not homebound he does not qualify for home care nursing.\"    Rj Johnson Cem Say, BSN RN  [facility]    "
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
## 2022-12-30 ENCOUNTER — ALLIED HEALTH/NURSE VISIT (OUTPATIENT)
Dept: FAMILY MEDICINE | Facility: CLINIC | Age: 52
End: 2022-12-30
Payer: COMMERCIAL

## 2022-12-30 DIAGNOSIS — F25.0 SCHIZOAFFECTIVE DISORDER, BIPOLAR TYPE (H): Primary | ICD-10-CM

## 2022-12-30 PROCEDURE — 96372 THER/PROPH/DIAG INJ SC/IM: CPT | Performed by: FAMILY MEDICINE

## 2022-12-30 PROCEDURE — 99207 PR NO CHARGE NURSE ONLY: CPT

## 2022-12-30 NOTE — PROGRESS NOTES
Injection of 156 mg Invega R deltoid.   Site chosen by patient.  Note sent to Provider re: three requests made by patient and cousin.

## 2023-01-30 ENCOUNTER — OFFICE VISIT (OUTPATIENT)
Dept: FAMILY MEDICINE | Facility: CLINIC | Age: 53
End: 2023-01-30
Payer: COMMERCIAL

## 2023-01-30 VITALS
DIASTOLIC BLOOD PRESSURE: 66 MMHG | SYSTOLIC BLOOD PRESSURE: 90 MMHG | HEART RATE: 85 BPM | TEMPERATURE: 97.9 F | OXYGEN SATURATION: 98 %

## 2023-01-30 DIAGNOSIS — F25.0 SCHIZOAFFECTIVE DISORDER, BIPOLAR TYPE (H): Primary | ICD-10-CM

## 2023-01-30 DIAGNOSIS — G81.94 LEFT HEMIPLEGIA (H): ICD-10-CM

## 2023-01-30 DIAGNOSIS — F12.10 MARIJUANA ABUSE: ICD-10-CM

## 2023-01-30 PROBLEM — F12.11 MARIJUANA ABUSE IN REMISSION: Status: RESOLVED | Noted: 2020-01-09 | Resolved: 2023-01-30

## 2023-01-30 PROCEDURE — 99214 OFFICE O/P EST MOD 30 MIN: CPT | Mod: 25 | Performed by: FAMILY MEDICINE

## 2023-01-30 PROCEDURE — 96372 THER/PROPH/DIAG INJ SC/IM: CPT | Performed by: FAMILY MEDICINE

## 2023-01-30 RX ORDER — DIVALPROEX SODIUM 500 MG/1
500 TABLET, EXTENDED RELEASE ORAL AT BEDTIME
COMMUNITY
Start: 2023-01-12 | End: 2024-09-01 | Stop reason: ALTCHOICE

## 2023-01-30 NOTE — PROGRESS NOTES
"ASSESMENT AND PLAN:  Diagnoses and all orders for this visit:  Schizoaffective disorder, bipolar type (H)  Extensive counseling done today with the patient and his caregiver on the importance of him not using marijuana or alcohol or other drugs and continue with his prescription medication management.  Invega injection given today.  Follow-up in 1 month for nurse only in 2 months for physician visit, sooner if problems.  Marijuana abuse, recurrence  Extensive counseling done today with the patient on the problems that this caused with his schizoaffective bipolar in the past and wanting to avoid those issues in the future.  He was counseled strongly to discontinue the use of marijuana or any other alcohol or street drugs.  Counseled the patient that I would not recommend marijuana as a medical marijuana option because of his history of adverse effect on his underlying mental illness with marijuana use in the past.  Left hemiplegia (H)  Improving.  Patient never got in with a formal physical therapy referral in the past but is doing home therapy with his caregiver as detailed below and is making some good progress.  Written prescription given today for a \"stroke glove\" as well as for a wheelchair cushion.  It seems like the patient is disabled enough that he would qualify for some PCA assistance and the patient and his caregiver are going to check with the insurance to see if any PCA services are covered.    Reviewed the risks and benefits of the treatment plan with the patient and/or caregiver and we discussed indications for routine and emergent follow-up.        SUBJECTIVE: Since last visit the patient did not get in with formal physical therapy consultation but has been doing his own home program with the help of a family member who has been acting as his main caregiver.  The patient and his caregiver report he has made some good progress in his arm strength and in his hand movements on the left side.  He is also " "able to walk better and is now able to walk with the support of a counter or ledge, he is using a wheelchair for longer distance ambulation, patient refuses to use a cane.  A walker would not be helpful because of his severe weakness in the left arm and hand.  Patient had talked to someone who had recommended he use a \"stroke glove\" to try to help with his left hand.  It is also uncomfortable for him when he goes on longer distances in the wheelchair because the wheelchair he uses currently does not have good padding.  Patient questions whether he could qualify for medical marijuana.  It turns out that he has been using street marijuana on a daily basis for the last few weeks.  In the past, this is caused significant exacerbation of his schizoaffective bipolar disorder when he has abused marijuana.  So far he has not noticed any recurrence of hallucinations or the buzzing feelings that he was getting in his head.    No past medical history on file.  Patient Active Problem List   Diagnosis     Bipolar affective disorder (H)     Primary insomnia     Alcohol abuse     Anxiety     Schizoaffective disorder, bipolar type (H)     Sinus tachycardia     Hypertriglyceridemia     Erectile dysfunction, unspecified erectile dysfunction type     Drug abuse (H)     Acute right MCA stroke (H)     Left hemiplegia (H)     Marijuana abuse     Current Outpatient Medications   Medication Sig Dispense Refill     aspirin (ASPIRIN LOW DOSE) 81 MG chewable tablet CHEW AND SWALLOW ONE TABLET BY MOUTH ONE TIME DAILY 90 tablet 3     atorvastatin (LIPITOR) 80 MG tablet Take 1 tablet (80 mg) by mouth every evening 90 tablet 3     divalproex sodium extended-release (DEPAKOTE ER) 500 MG 24 hr tablet Take 500 mg by mouth At Bedtime       omeprazole (PRILOSEC) 40 MG DR capsule TAKE 1 CAPSULE BY MOUTH ONE TIME DAILY 90 capsule 3     propranolol ER (INDERAL LA) 60 MG 24 hr capsule Take 1 capsule (60 mg) by mouth daily 90 capsule 3     QUEtiapine " (SEROQUEL) 200 MG tablet [QUETIAPINE (SEROQUEL) 200 MG TABLET] Take 200 mg by mouth at bedtime. Take with 300 mg tablet for total of 500 mg at bedtime       QUEtiapine (SEROQUEL) 300 MG tablet [QUETIAPINE (SEROQUEL) 300 MG TABLET] Take 300 mg by mouth at bedtime. Take with 200 mg for total of 500 mg nightly       History   Smoking Status     Never   Smokeless Tobacco     Never       OBJECTICE: BP 90/66 (BP Location: Right arm)   Pulse 85   Temp 97.9  F (36.6  C) (Oral)   SpO2 98%      No results found for this or any previous visit (from the past 24 hour(s)).     Neurologic- he does have improved movement of the fingers in the left hand and improved strength in his left arm, now able to elevate the arm to about shoulder level.   CV-regular rate and rhythm   RESP-lungs clear   Psychiatric-appearance is well-groomed, speech per usual baseline, affect per usual baseline, thought content negative for suicidal homicidal ideation, thought processing negative for paranoid or delusional thinking.    Telly Arias MD

## 2023-03-02 ENCOUNTER — ALLIED HEALTH/NURSE VISIT (OUTPATIENT)
Dept: FAMILY MEDICINE | Facility: CLINIC | Age: 53
End: 2023-03-02
Payer: COMMERCIAL

## 2023-03-02 DIAGNOSIS — F25.0 SCHIZOAFFECTIVE DISORDER, BIPOLAR TYPE (H): Primary | ICD-10-CM

## 2023-03-02 PROCEDURE — 96372 THER/PROPH/DIAG INJ SC/IM: CPT | Performed by: FAMILY MEDICINE

## 2023-03-02 PROCEDURE — 99207 PR NO CHARGE NURSE ONLY: CPT

## 2023-03-02 NOTE — PROGRESS NOTES
The following medication was given:     MEDICATION: iNVEGA  ROUTE: IM  SITE: Deltoid - Right  DOSE: 156 MG/ML  LOT #: [id number]  :  Molly  EXPIRATION DATE:  7/30/2024  NDC#: 71612-048-70

## 2023-03-07 ENCOUNTER — PATIENT OUTREACH (OUTPATIENT)
Dept: CARE COORDINATION | Facility: CLINIC | Age: 53
End: 2023-03-07
Payer: COMMERCIAL

## 2023-03-08 ENCOUNTER — PATIENT OUTREACH (OUTPATIENT)
Dept: CARE COORDINATION | Facility: CLINIC | Age: 53
End: 2023-03-08
Payer: COMMERCIAL

## 2023-03-08 NOTE — PROGRESS NOTES
"Clinic Care Coordination Contact  [facility]/Voicemail    Clinical Data: Care Coordinator Outreach  Outreach attempted x 1. CHW attempted to call patient 3x, unable to reach and nor able to leave a detail voice message for patient to call back. Patient's phone rang 2-3x and just dropped which sounded like \"hung up\".     Plan: Care Coordinator will try to reach patient again in 1-2 business days.        "

## 2023-03-08 NOTE — PROGRESS NOTES
Clinical Product Navigator RN reviewed chart; patient on payer product coverage.  Review results: patient was identified his health plan as candidate for care coordination services.   CVA within last year and complicated PMH, including substance use.    Referral to CCC placed.     Eden Mccormick RN/Clinical Product Navigator

## 2023-03-09 ENCOUNTER — PATIENT OUTREACH (OUTPATIENT)
Dept: CARE COORDINATION | Facility: CLINIC | Age: 53
End: 2023-03-09
Payer: COMMERCIAL

## 2023-03-09 NOTE — PROGRESS NOTES
"Clinic Care Coordination Contact  Community Health Worker Initial Outreach    CHW Initial Information Gathering:  Referral Source: Pro-Active Outreach  Preferred Hospital: [facility]  [phone]  Preferred Urgent Care: [facility] [place], [phone]  Current living arrangement:: I live in a private home with family  Type of residence:: Private home - stairs  Community Resources: Home Care  Supplies Currently Used at Home: Other  Equipment Currently Used at Home: none  Informal Support system:: Family, Friends  No PCP office visit in Past Year: No  Transportation means:: Accessible car, Family  CHW Additional Questions  If ED/Hospital discharge, follow-up appointment scheduled as recommended?: N/A  Medication changes made following ED/Hospital discharge?: N/A  MyChart active?: No  Patient agreeable to assistance with activating MyChart?: No    Patient accepts CC: No, patient stated, \"I don't need other services\". Patient will be sent Care Coordination introduction letter for future reference.     Patient was referred to CCC to assist with \"Identified by the health insurance plan as candidate for care coordination- CVA in Spring 2022  Assist with PCA referral, DMEs, community resources\". CHW called and spoke with patient who informed CHW that he does not need other services at this time. Patient was informed to call if CCC assistance needed.   "

## 2023-04-06 ENCOUNTER — TELEPHONE (OUTPATIENT)
Dept: FAMILY MEDICINE | Facility: CLINIC | Age: 53
End: 2023-04-06

## 2023-04-06 NOTE — TELEPHONE ENCOUNTER
Patient Returning Call    Reason for call:  Returning call.      Information relayed to patient:  A reminder call for upcoming appt on 4/12/23 at 1 pm with PCP along with Invega injection.  Understood.     Patient has additional questions:  No    Okay to leave a detailed message?: No task completed. Thanks.     Call taken on 4/6/23 at 922 am by MEGHANN Cam

## 2023-04-12 ENCOUNTER — TELEPHONE (OUTPATIENT)
Dept: FAMILY MEDICINE | Facility: CLINIC | Age: 53
End: 2023-04-12

## 2023-04-12 ENCOUNTER — OFFICE VISIT (OUTPATIENT)
Dept: FAMILY MEDICINE | Facility: CLINIC | Age: 53
End: 2023-04-12
Payer: COMMERCIAL

## 2023-04-12 VITALS
HEART RATE: 91 BPM | TEMPERATURE: 98.9 F | WEIGHT: 178 LBS | SYSTOLIC BLOOD PRESSURE: 102 MMHG | OXYGEN SATURATION: 97 % | BODY MASS INDEX: 24.11 KG/M2 | RESPIRATION RATE: 12 BRPM | HEIGHT: 72 IN | DIASTOLIC BLOOD PRESSURE: 70 MMHG

## 2023-04-12 DIAGNOSIS — F25.0 SCHIZOAFFECTIVE DISORDER, BIPOLAR TYPE (H): Primary | ICD-10-CM

## 2023-04-12 DIAGNOSIS — F10.20 ALCOHOL DEPENDENCE, UNCOMPLICATED (H): ICD-10-CM

## 2023-04-12 DIAGNOSIS — G81.94 LEFT HEMIPLEGIA (H): ICD-10-CM

## 2023-04-12 DIAGNOSIS — F12.10 MARIJUANA ABUSE: ICD-10-CM

## 2023-04-12 DIAGNOSIS — Z23 NEED FOR VACCINATION: ICD-10-CM

## 2023-04-12 PROCEDURE — 99214 OFFICE O/P EST MOD 30 MIN: CPT | Mod: 25 | Performed by: FAMILY MEDICINE

## 2023-04-12 PROCEDURE — 96372 THER/PROPH/DIAG INJ SC/IM: CPT | Performed by: FAMILY MEDICINE

## 2023-04-12 NOTE — PROGRESS NOTES
"ASSESMENT AND PLAN:  Diagnoses and all orders for this visit:  Schizoaffective disorder, bipolar type (H)  Stable.  Counseling done today with the patient.  Invega injection today and return in 1 month for nurse only visit for Invega injection and then again in 2 months to see me for follow-up.  Alcohol dependence, uncomplicated (H)  Currently in remission.  Counseling done with the patient on the importance of sustained remission and complete abstinence from alcohol use.  Marijuana abuse   improving but still not resolved.  Counseling done today with the patient on the importance of continued progress on this.  Left hemiplegia (H)  Secondary to stroke.  He has gained some significant strength and function on the left side of the body but is still significantly disabled by this.  He does require a wheelchair for safe navigation over longer distances both in the home and outside of the home, push wheelchair is appropriate and can be ambulated by his caregivers.  This is medically necessary because of his weakness in the left side of the body secondary to stroke.  Also medically necessary is a \"stroke glove\" for which a written prescription was given today because he lost the one that he had had previously.  I also think it is likely that he will qualify for PCA assistance in the home given that he is limited in his ability to complete his ADLs without assistance.  I did a written order today for home PCA assessment.  Caregiver is going to contact the health insurance to see if a home PCA assessment can be set up.  Need for vaccination  Immunization review and counseling done with the patient.  -     HEPATITIS B VACCINE ADULT 3 DOSE IM (ENGERIX-B/RECOMBIVAX HB)      Reviewed the risks and benefits of the treatment plan with the patient and/or caregiver and we discussed indications for routine and emergent follow-up.        SUBJECTIVE: Since last visit the patient has not had any recurrence of hallucinations or the " overactive or intrusive thoughts that he had been experiencing previously.  He feels like overall things have been well controlled.  He has not used any alcohol since last visit.  He has used marijuana on a couple of occasions in small amounts, he continues to work on cutting back on this.  He is doing a home exercise program strength in his left hand and arm, he still has difficulty gripping things and still has significant weakness in the left side of the body.  He is able to walk over short distances but uses a wheelchair for safe ambulation over longer distances.    No past medical history on file.  Patient Active Problem List   Diagnosis     Bipolar affective disorder (H)     Primary insomnia     Alcohol abuse     Anxiety     Schizoaffective disorder, bipolar type (H)     Sinus tachycardia     Hypertriglyceridemia     Erectile dysfunction, unspecified erectile dysfunction type     Drug abuse (H)     Acute right MCA stroke (H)     Left hemiplegia (H)     Marijuana abuse     Alcohol dependence, uncomplicated (H)     Current Outpatient Medications   Medication Sig Dispense Refill     aspirin (ASPIRIN LOW DOSE) 81 MG chewable tablet CHEW AND SWALLOW ONE TABLET BY MOUTH ONE TIME DAILY 90 tablet 3     atorvastatin (LIPITOR) 80 MG tablet Take 1 tablet (80 mg) by mouth every evening 90 tablet 3     divalproex sodium extended-release (DEPAKOTE ER) 500 MG 24 hr tablet Take 500 mg by mouth At Bedtime       omeprazole (PRILOSEC) 40 MG DR capsule TAKE 1 CAPSULE BY MOUTH ONE TIME DAILY 90 capsule 3     propranolol ER (INDERAL LA) 60 MG 24 hr capsule Take 1 capsule (60 mg) by mouth daily 90 capsule 3     QUEtiapine (SEROQUEL) 200 MG tablet [QUETIAPINE (SEROQUEL) 200 MG TABLET] Take 200 mg by mouth at bedtime. Take with 300 mg tablet for total of 500 mg at bedtime       QUEtiapine (SEROQUEL) 300 MG tablet [QUETIAPINE (SEROQUEL) 300 MG TABLET] Take 300 mg by mouth at bedtime. Take with 200 mg for total of 500 mg nightly        History   Smoking Status     Never   Smokeless Tobacco     Never       OBJECTICE: /70   Pulse 91   Temp 98.9  F (37.2  C) (Oral)   Resp 12   Ht 1.829 m (6')   Wt 80.7 kg (178 lb)   SpO2 97%   BMI 24.14 kg/m       No results found for this or any previous visit (from the past 24 hour(s)).     Neurologic- left upper and lower extremity weakness compared to the right side.   CV-regular rate and rhythm   RESP-lungs clear   Psychiatric-appearance is well-groomed, speech of normal fluency and rate, mood is not depressed, affect is normal, thought content negative for suicidal or homicidal ideation, thought processing negative for paranoid or delusional thinking.      Telly Arias MD

## 2023-04-12 NOTE — TELEPHONE ENCOUNTER
Order/Referral Request    Who is requesting: order for W/C - pt states PCP ordered one    Orders being requested: by [facility]    Reason service is needed/diagnosis: unknown    When are orders needed by: if appropriate with PCP right away    Has this been discussed with Provider: Pt  Had appt today with PCP, but unsure if discussed    Does patient have a preference on a Group/Provider/Facility? [facility]    Does patient have an appointment scheduled?: No    Where to send orders: [facility] faxing over today - if appropriate can fax back    Okay to leave a detailed message?: No can just fax orders back, if PCP agrees. Thanks    Call taken on 4/12/23 at 230 pm by MEGHANN Cam

## 2023-04-17 NOTE — TELEPHONE ENCOUNTER
Patient Returning Call    Reason for call:   Healthcare calling to check if fax for W/C orders for pt received.  TC did not see fax , they will resend today.  IF already received - see below.     Patient has additional questions:  No      Okay to leave a detailed message?: No just fax back when completed. Thanks    Call taken on 4/17/23 at 1023 am by MEGHANN Cam

## 2023-04-18 NOTE — TELEPHONE ENCOUNTER
[facility] called back to check on wheelchair orders. They are waiting on the doctor to sign notes, so the orders for the wheelchair can be processed.

## 2023-04-19 ENCOUNTER — MEDICAL CORRESPONDENCE (OUTPATIENT)
Dept: HEALTH INFORMATION MANAGEMENT | Facility: CLINIC | Age: 53
End: 2023-04-19
Payer: COMMERCIAL

## 2023-04-19 NOTE — TELEPHONE ENCOUNTER
Unable to reach and no fax information included in notes below.     When this party calls back, please let them know that there is no update on any orders for W/C from MD. If there is a form to be signed, MUST send new form.     MD asking me to send office notes to the party but does not have fax information.

## 2023-05-15 ENCOUNTER — ALLIED HEALTH/NURSE VISIT (OUTPATIENT)
Dept: FAMILY MEDICINE | Facility: CLINIC | Age: 53
End: 2023-05-15
Payer: COMMERCIAL

## 2023-05-15 DIAGNOSIS — Z23 NEED FOR VACCINATION: Primary | ICD-10-CM

## 2023-05-15 PROCEDURE — 90471 IMMUNIZATION ADMIN: CPT

## 2023-05-15 PROCEDURE — 90746 HEPB VACCINE 3 DOSE ADULT IM: CPT

## 2023-05-15 PROCEDURE — 99207 PR NO CHARGE NURSE ONLY: CPT

## 2023-05-15 PROCEDURE — 96372 THER/PROPH/DIAG INJ SC/IM: CPT | Performed by: FAMILY MEDICINE

## 2023-05-15 NOTE — PROGRESS NOTES
Prior to immunization administration, verified patients identity using patient s name and date of birth. Please see Immunization Activity for additional information.     Screening Questionnaire for Adult Immunization    Are you sick today?   No   Do you have allergies to medications, food, a vaccine component or latex?   Yes   Have you ever had a serious reaction after receiving a vaccination?   No   Do you have a long-term health problem with heart, lung, kidney, or metabolic disease (e.g., diabetes), asthma, a blood disorder, no spleen, complement component deficiency, a cochlear implant, or a spinal fluid leak?  Are you on long-term aspirin therapy?   Yes, aspirin every night.   Do you have cancer, leukemia, HIV/AIDS, or any other immune system problem?   No   Do you have a parent, brother, or sister with an immune system problem?   No   In the past 3 months, have you taken medications that affect  your immune system, such as prednisone, other steroids, or anticancer drugs; drugs for the treatment of rheumatoid arthritis, Crohn s disease, or psoriasis; or have you had radiation treatments?   No   Have you had a seizure, or a brain or other nervous system problem?   No   During the past year, have you received a transfusion of blood or blood    products, or been given immune (gamma) globulin or antiviral drug?   No   For women: Are you pregnant or is there a chance you could become       pregnant during the next month?   No   Have you received any vaccinations in the past 4 weeks?   No     Immunization questionnaire was positive for at least one answer.  Dr Arias is aware.    I have reviewed the following standing orders: Not Applicable; Order were already placed prior to ancillary visit    Injection of Hep B #2 given by Kavon Patton. Patient instructed to remain in clinic for 15 minutes afterwards, and to report any adverse reactions.     Screening performed by Kavon Patton on 5/15/2023 at 9:20 AM.    Clinic  Administered Medication Documentation      Injectable Medication Documentation    Is there an active order (written within the past 365 days, with administrations remaining, not ) in the chart? Yes.     Patient was given Invega 156mg. Prior to medication administration, verified patient's identity using patient s name and date of birth. Please see MAR and medication order for additional information. Patient instructed to remain in clinic for 15 minutes and report any adverse reaction to staff immediately.    Vial/Syringe: Single dose vial. Was entire vial of medication used? Yes  Was this medication supplied by the patient? No  Is this a medication the patient will need to receive again? Yes. Verified that the patient has refills remaining in their prescription.

## 2023-05-27 DIAGNOSIS — Z76.0 ENCOUNTER FOR MEDICATION REFILL: ICD-10-CM

## 2023-05-27 DIAGNOSIS — K21.9 GERD (GASTROESOPHAGEAL REFLUX DISEASE): ICD-10-CM

## 2023-05-28 RX ORDER — OMEPRAZOLE 40 MG/1
CAPSULE, DELAYED RELEASE ORAL
Qty: 90 CAPSULE | Refills: 3 | Status: SHIPPED | OUTPATIENT
Start: 2023-05-28 | End: 2023-08-24

## 2023-05-28 NOTE — TELEPHONE ENCOUNTER
"Last Written Prescription Date:  3/10/22  Last Fill Quantity: 90,  # refills: 3   Last office visit provider:  4/12/23     Requested Prescriptions   Pending Prescriptions Disp Refills     omeprazole (PRILOSEC) 40 MG DR capsule [Pharmacy Med Name: Omeprazole Oral Capsule Delayed Release 40 MG] 90 capsule 0     Sig: TAKE ONE CAPSULE BY MOUTH ONE TIME DAILY       PPI Protocol Passed - 5/27/2023 12:49 PM        Passed - Not on Clopidogrel (unless Pantoprazole ordered)        Passed - No diagnosis of osteoporosis on record        Passed - Recent (12 mo) or future (30 days) visit within the authorizing provider's specialty     Patient has had an office visit with the authorizing provider or a provider within the authorizing providers department within the previous 12 mos or has a future within next 30 days. See \"Patient Info\" tab in inbasket, or \"Choose Columns\" in Meds & Orders section of the refill encounter.              Passed - Medication is active on med list        Passed - Patient is age 18 or older             Lucy Zurita RN 05/28/23 12:26 PM  "

## 2023-06-15 ENCOUNTER — OFFICE VISIT (OUTPATIENT)
Dept: FAMILY MEDICINE | Facility: CLINIC | Age: 53
End: 2023-06-15
Payer: COMMERCIAL

## 2023-06-15 VITALS
BODY MASS INDEX: 22.04 KG/M2 | HEART RATE: 97 BPM | SYSTOLIC BLOOD PRESSURE: 102 MMHG | TEMPERATURE: 97.1 F | DIASTOLIC BLOOD PRESSURE: 70 MMHG | RESPIRATION RATE: 16 BRPM | HEIGHT: 72 IN | OXYGEN SATURATION: 97 % | WEIGHT: 162.75 LBS

## 2023-06-15 DIAGNOSIS — G81.94 LEFT HEMIPLEGIA (H): ICD-10-CM

## 2023-06-15 DIAGNOSIS — R79.9 ABNORMAL FINDING OF BLOOD CHEMISTRY, UNSPECIFIED: ICD-10-CM

## 2023-06-15 DIAGNOSIS — Z12.11 SCREENING FOR COLON CANCER: ICD-10-CM

## 2023-06-15 DIAGNOSIS — F25.0 SCHIZOAFFECTIVE DISORDER, BIPOLAR TYPE (H): Primary | ICD-10-CM

## 2023-06-15 DIAGNOSIS — R63.4 WEIGHT LOSS: ICD-10-CM

## 2023-06-15 DIAGNOSIS — Z12.5 ENCOUNTER FOR SCREENING FOR MALIGNANT NEOPLASM OF PROSTATE: ICD-10-CM

## 2023-06-15 LAB
BASOPHILS # BLD AUTO: 0 10E3/UL (ref 0–0.2)
BASOPHILS NFR BLD AUTO: 1 %
EOSINOPHIL # BLD AUTO: 0.1 10E3/UL (ref 0–0.7)
EOSINOPHIL NFR BLD AUTO: 2 %
ERYTHROCYTE [DISTWIDTH] IN BLOOD BY AUTOMATED COUNT: 12.5 % (ref 10–15)
HBA1C MFR BLD: 5.3 % (ref 0–5.6)
HCT VFR BLD AUTO: 44.9 % (ref 40–53)
HGB BLD-MCNC: 14.7 G/DL (ref 13.3–17.7)
IMM GRANULOCYTES # BLD: 0 10E3/UL
IMM GRANULOCYTES NFR BLD: 0 %
LYMPHOCYTES # BLD AUTO: 1.9 10E3/UL (ref 0.8–5.3)
LYMPHOCYTES NFR BLD AUTO: 32 %
MCH RBC QN AUTO: 30.9 PG (ref 26.5–33)
MCHC RBC AUTO-ENTMCNC: 32.7 G/DL (ref 31.5–36.5)
MCV RBC AUTO: 95 FL (ref 78–100)
MONOCYTES # BLD AUTO: 0.5 10E3/UL (ref 0–1.3)
MONOCYTES NFR BLD AUTO: 8 %
NEUTROPHILS # BLD AUTO: 3.2 10E3/UL (ref 1.6–8.3)
NEUTROPHILS NFR BLD AUTO: 57 %
PLATELET # BLD AUTO: 228 10E3/UL (ref 150–450)
PSA SERPL DL<=0.01 NG/ML-MCNC: 1.38 NG/ML (ref 0–3.5)
RBC # BLD AUTO: 4.75 10E6/UL (ref 4.4–5.9)
TSH SERPL DL<=0.005 MIU/L-ACNC: 1.54 UIU/ML (ref 0.3–4.2)
WBC # BLD AUTO: 5.7 10E3/UL (ref 4–11)

## 2023-06-15 PROCEDURE — 99214 OFFICE O/P EST MOD 30 MIN: CPT | Mod: 25 | Performed by: FAMILY MEDICINE

## 2023-06-15 PROCEDURE — 83036 HEMOGLOBIN GLYCOSYLATED A1C: CPT | Performed by: FAMILY MEDICINE

## 2023-06-15 PROCEDURE — 85025 COMPLETE CBC W/AUTO DIFF WBC: CPT | Performed by: FAMILY MEDICINE

## 2023-06-15 PROCEDURE — G0103 PSA SCREENING: HCPCS | Performed by: FAMILY MEDICINE

## 2023-06-15 PROCEDURE — 84443 ASSAY THYROID STIM HORMONE: CPT | Performed by: FAMILY MEDICINE

## 2023-06-15 PROCEDURE — 36415 COLL VENOUS BLD VENIPUNCTURE: CPT | Performed by: FAMILY MEDICINE

## 2023-06-15 PROCEDURE — 96372 THER/PROPH/DIAG INJ SC/IM: CPT | Performed by: FAMILY MEDICINE

## 2023-06-15 NOTE — PROGRESS NOTES
ASSESMENT AND PLAN:  Diagnoses and all orders for this visit:  Schizoaffective disorder, bipolar type (H)  Stable.  Invega injection given today.  Recheck in 1 month with a nurse visit for Invega injection and again with me in 2 months.  Weight loss  Unclear etiology.  Reviewed differential diagnosis with the patient today in detail.  We will start with a limited lab work-up, is also due for his colonoscopy which has been ordered as detailed below.  I think it is largely dietary and postop changes leading to the weight loss.  Counseling done on dietary intervention today with the patient and his caregiver.  We will plan to recheck again in 2 months, if at that time he has not regained some weight then will need further evaluation potentially including imaging such as chest x-ray or CT of chest abdomen and pelvis.  -     CBC with platelets and differential; Future  -     PSA, screen; Future  -     Hemoglobin A1c; Future  -     TSH   Left hemiplegia (H)  Stable.  Secondary to stroke.  Abnormal finding of blood chemistry, unspecified  -     Hemoglobin A1c; Future  Screening for colon cancer  -     Colonoscopy Screening  Referral; Future  Encounter for screening for malignant neoplasm of prostate  -     PSA, screen; Future      Reviewed the risks and benefits of the treatment plan with the patient and/or caregiver and we discussed indications for routine and emergent follow-up.        SUBJECTIVE: 53-year-old male has a history of schizoaffective disorder that has been under good control on monthly Invega injections.  Since his last visit with me he has not had any recurrence of hallucinations.  No recurrence of the tingling sensation that he was having in his head.  He feels his mood has been stable.  He is able to enjoy things in his daily life.  He is not having thoughts of self-harm.  Patient and his caregiver reported concern about weight loss.  His caregiver reports that the patient is an extremely picky  eater ever since his stroke and has a very limited diet and skips meals if his preferred foods are not available.  Patient acknowledges that he likes to eat boxed cereal and some other limited foods such as pot pies.  If those foods are not available then he acknowledges that he will often skip a meal.  He does not feel like his appetite has been the same since his stroke.    No past medical history on file.  Patient Active Problem List   Diagnosis     Bipolar affective disorder (H)     Primary insomnia     Alcohol abuse     Anxiety     Schizoaffective disorder, bipolar type (H)     Sinus tachycardia     Hypertriglyceridemia     Erectile dysfunction, unspecified erectile dysfunction type     Drug abuse (H)     Acute right MCA stroke (H)     Left hemiplegia (H)     Marijuana abuse     Alcohol dependence, uncomplicated (H)     Current Outpatient Medications   Medication Sig Dispense Refill     aspirin (ASPIRIN LOW DOSE) 81 MG chewable tablet CHEW AND SWALLOW ONE TABLET BY MOUTH ONE TIME DAILY 90 tablet 3     atorvastatin (LIPITOR) 80 MG tablet Take 1 tablet (80 mg) by mouth every evening 90 tablet 3     divalproex sodium extended-release (DEPAKOTE ER) 500 MG 24 hr tablet Take 500 mg by mouth At Bedtime       omeprazole (PRILOSEC) 40 MG DR capsule TAKE ONE CAPSULE BY MOUTH ONE TIME DAILY 90 capsule 3     propranolol ER (INDERAL LA) 60 MG 24 hr capsule Take 1 capsule (60 mg) by mouth daily 90 capsule 3     QUEtiapine (SEROQUEL) 200 MG tablet [QUETIAPINE (SEROQUEL) 200 MG TABLET] Take 200 mg by mouth at bedtime. Take with 300 mg tablet for total of 500 mg at bedtime       QUEtiapine (SEROQUEL) 300 MG tablet [QUETIAPINE (SEROQUEL) 300 MG TABLET] Take 300 mg by mouth at bedtime. Take with 200 mg for total of 500 mg nightly       History   Smoking Status     Never   Smokeless Tobacco     Never       OBJECTICE: /70   Pulse 97   Temp 97.1  F (36.2  C) (Temporal)   Resp 16   Ht 1.829 m (6')   Wt 73.8 kg (162 lb 12  oz)   SpO2 97%   BMI 22.07 kg/m       Recent Results (from the past 24 hour(s))   Hemoglobin A1c    Collection Time: 06/15/23 10:48 AM   Result Value Ref Range    Hemoglobin A1C 5.3 0.0 - 5.6 %   CBC with platelets and differential    Collection Time: 06/15/23 10:48 AM   Result Value Ref Range    WBC Count 5.7 4.0 - 11.0 10e3/uL    RBC Count 4.75 4.40 - 5.90 10e6/uL    Hemoglobin 14.7 13.3 - 17.7 g/dL    Hematocrit 44.9 40.0 - 53.0 %    MCV 95 78 - 100 fL    MCH 30.9 26.5 - 33.0 pg    MCHC 32.7 31.5 - 36.5 g/dL    RDW 12.5 10.0 - 15.0 %    Platelet Count 228 150 - 450 10e3/uL    % Neutrophils 57 %    % Lymphocytes 32 %    % Monocytes 8 %    % Eosinophils 2 %    % Basophils 1 %    % Immature Granulocytes 0 %    Absolute Neutrophils 3.2 1.6 - 8.3 10e3/uL    Absolute Lymphocytes 1.9 0.8 - 5.3 10e3/uL    Absolute Monocytes 0.5 0.0 - 1.3 10e3/uL    Absolute Eosinophils 0.1 0.0 - 0.7 10e3/uL    Absolute Basophils 0.0 0.0 - 0.2 10e3/uL    Absolute Immature Granulocytes 0.0 <=0.4 10e3/uL        HEENT-neck is supple without any palpable thyroid abnormality or lymphadenopathy   CV-regular rate and rhythm   RESP-lungs clear   ABDOMINAL-soft, nontender, no palpable masses or organomegaly   Psychiatric-appearance is well-groomed, speech of normal fluency and rate, mood is mildly depressed, affect is normal, thought content negative for suicidal or homicidal ideation, thought processing negative for paranoid or delusional thinking.    Telly Arias MD         6/15/2023    10:10 AM   Additional Questions   Roomed by Maria Matthews MA   Accompanied by Cousin - Care taker

## 2023-07-20 ENCOUNTER — ALLIED HEALTH/NURSE VISIT (OUTPATIENT)
Dept: FAMILY MEDICINE | Facility: CLINIC | Age: 53
End: 2023-07-20
Payer: COMMERCIAL

## 2023-07-20 ENCOUNTER — TELEPHONE (OUTPATIENT)
Dept: FAMILY MEDICINE | Facility: CLINIC | Age: 53
End: 2023-07-20

## 2023-07-20 DIAGNOSIS — F25.0 SCHIZOAFFECTIVE DISORDER, BIPOLAR TYPE (H): Primary | ICD-10-CM

## 2023-07-20 PROCEDURE — 99207 PR NO CHARGE NURSE ONLY: CPT

## 2023-07-20 PROCEDURE — 96372 THER/PROPH/DIAG INJ SC/IM: CPT | Performed by: FAMILY MEDICINE

## 2023-07-20 NOTE — TELEPHONE ENCOUNTER
Provider updated from injection today.  Patient plans to receive next due during visit with pcp during visit on 8/24/23 which will put patient at 7 days overdue.      SULMA Barker, RN  [facility]    Telly Arias MD Moua, Tao, RN  7 days overdue is OK   Thanks

## 2023-07-20 NOTE — PROGRESS NOTES
Clinic Administered Medication Documentation      Injectable Medication Documentation    Is there an active order (written within the past 365 days, with administrations remaining, not ) in the chart? Yes.     Patient was given Invega Sustenna. Prior to medication administration, verified patient's identity using patient s name and date of birth. Please see MAR and medication order for additional information. Patient instructed to remain in clinic for 15 minutes.    Vial/Syringe: Syringe  Was this medication supplied by the patient? No  Is this a medication the patient will need to receive again? Yes. Verified that the patient has refills remaining in their prescription.

## 2023-08-24 ENCOUNTER — OFFICE VISIT (OUTPATIENT)
Dept: FAMILY MEDICINE | Facility: CLINIC | Age: 53
End: 2023-08-24
Payer: COMMERCIAL

## 2023-08-24 VITALS
BODY MASS INDEX: 20.86 KG/M2 | WEIGHT: 154 LBS | OXYGEN SATURATION: 99 % | TEMPERATURE: 97.9 F | HEIGHT: 72 IN | SYSTOLIC BLOOD PRESSURE: 105 MMHG | HEART RATE: 81 BPM | RESPIRATION RATE: 24 BRPM | DIASTOLIC BLOOD PRESSURE: 75 MMHG

## 2023-08-24 DIAGNOSIS — R63.4 UNEXPLAINED WEIGHT LOSS: ICD-10-CM

## 2023-08-24 DIAGNOSIS — F25.0 SCHIZOAFFECTIVE DISORDER, BIPOLAR TYPE (H): Primary | ICD-10-CM

## 2023-08-24 DIAGNOSIS — K21.9 GASTROESOPHAGEAL REFLUX DISEASE WITHOUT ESOPHAGITIS: ICD-10-CM

## 2023-08-24 PROCEDURE — 96372 THER/PROPH/DIAG INJ SC/IM: CPT | Performed by: FAMILY MEDICINE

## 2023-08-24 PROCEDURE — 99214 OFFICE O/P EST MOD 30 MIN: CPT | Mod: 25 | Performed by: FAMILY MEDICINE

## 2023-08-24 RX ORDER — OMEPRAZOLE 40 MG/1
40 CAPSULE, DELAYED RELEASE ORAL DAILY
Qty: 90 CAPSULE | Refills: 3 | Status: SHIPPED | OUTPATIENT
Start: 2023-08-24 | End: 2023-10-09

## 2023-08-24 NOTE — PROGRESS NOTES
ASSESMENT AND PLAN:  Diagnoses and all orders for this visit:  Schizoaffective disorder, bipolar type (H)  Stable.  Counseling done today with the patient.  Invega injection given.  He will follow-up in 1 month.  Gastroesophageal reflux disease without esophagitis  Controlled with omeprazole, refilled as below.  -     omeprazole (PRILOSEC) 40 MG DR capsule; Take 1 capsule (40 mg) by mouth daily  Unexplained weight loss, severe  Patient has lost an additional 8 pounds over the last 2 months, putting him at a over 50 pound weight loss over this past 9 months.  Reviewed the lab results from his recent work-up which all came back normal.  Patient reports that no one ever called him to schedule the colonoscopy that I ordered.  He is meeting with our specialty  today to get that scheduled.  In addition, given the profound weight loss that is unexplained I recommended CT of the chest abdomen and pelvis.  Patient initially declines the test.  However, after a detailed discussion today about the differential diagnosis including the possibility of malignancy and the necessity of getting a diagnosis as soon as possible to maximize treatment options if he were to have a malignancy, he agrees to proceed with the CT.  Our specialty  is also meeting with him to help get this scheduled.  -     CT Chest/Abdomen/Pelvis w Contrast; Future      Reviewed the risks and benefits of the treatment plan with the patient and/or caregiver and we discussed indications for routine and emergent follow-up.        SUBJECTIVE: 53-year-old male in for follow-up.  His schizoaffective disorder has been stable and he has not noticed any new or recurrent issues with this as long as he has been on his monthly Invega injections for which she is due today.  He continues to have very concerning weight loss pattern.  Previously started work-up for this-see previous note for details.  Patient acknowledges that his appetite is poor, he only  "eats 2 meals per day and says that \"I am a very picky eater\" but his weight loss cumulative now since November is over 50 pounds.    No past medical history on file.  Patient Active Problem List   Diagnosis    Bipolar affective disorder (H)    Primary insomnia    Alcohol abuse    Anxiety    Schizoaffective disorder, bipolar type (H)    Sinus tachycardia    Hypertriglyceridemia    Erectile dysfunction, unspecified erectile dysfunction type    Drug abuse (H)    Acute right MCA stroke (H)    Left hemiplegia (H)    Marijuana abuse    Alcohol dependence, uncomplicated (H)     Current Outpatient Medications   Medication Sig Dispense Refill    aspirin (ASPIRIN LOW DOSE) 81 MG chewable tablet CHEW AND SWALLOW ONE TABLET BY MOUTH ONE TIME DAILY 90 tablet 3    atorvastatin (LIPITOR) 80 MG tablet Take 1 tablet (80 mg) by mouth every evening 90 tablet 3    divalproex sodium extended-release (DEPAKOTE ER) 500 MG 24 hr tablet Take 500 mg by mouth At Bedtime      omeprazole (PRILOSEC) 40 MG DR capsule Take 1 capsule (40 mg) by mouth daily 90 capsule 3    propranolol ER (INDERAL LA) 60 MG 24 hr capsule Take 1 capsule (60 mg) by mouth daily 90 capsule 3    QUEtiapine (SEROQUEL) 200 MG tablet [QUETIAPINE (SEROQUEL) 200 MG TABLET] Take 200 mg by mouth at bedtime. Take with 300 mg tablet for total of 500 mg at bedtime      QUEtiapine (SEROQUEL) 300 MG tablet [QUETIAPINE (SEROQUEL) 300 MG TABLET] Take 300 mg by mouth at bedtime. Take with 200 mg for total of 500 mg nightly       History   Smoking Status    Never   Smokeless Tobacco    Never       OBJECTICE: /75   Pulse 81   Temp 97.9  F (36.6  C) (Oral)   Resp 24   Ht 1.829 m (6')   Wt 69.9 kg (154 lb)   SpO2 99%   BMI 20.89 kg/m       No results found for this or any previous visit (from the past 24 hour(s)).     GEN-alert, in no acute distress   CV-regular rate and rhythm with no murmur   RESP-lungs clear to auscultation   Psychiatric-appearance is well-groomed, speech is " slow per baseline, affect is somewhat flat, thought content negative for suicidal or homicidal ideation, thought processing negative for paranoid or delusional thinking.    Telly Arias MD

## 2023-08-28 ENCOUNTER — HOSPITAL ENCOUNTER (OUTPATIENT)
Facility: AMBULATORY SURGERY CENTER | Age: 53
End: 2023-08-28
Attending: SURGERY
Payer: COMMERCIAL

## 2023-08-28 ENCOUNTER — TELEPHONE (OUTPATIENT)
Dept: GASTROENTEROLOGY | Facility: CLINIC | Age: 53
End: 2023-08-28
Payer: COMMERCIAL

## 2023-08-28 NOTE — TELEPHONE ENCOUNTER
"Endoscopy Scheduling Screen    Have you had a positive Covid test in the last 14 days?  No    Are you active on MyChart?   No    What insurance is in the chart?  Other:  [facility]    Ordering/Referring Provider:   STEVE KEN        (If ordering provider performs procedure, schedule with ordering provider unless otherwise instructed. )    BMI: Estimated body mass index is 20.89 kg/m  as calculated from the following:    Height as of 8/24/23: 1.829 m (6').    Weight as of 8/24/23: 69.9 kg (154 lb).     Sedation Ordered  moderate sedation.   If patient BMI > 50 do not schedule in ASC.    Are you taking any prescription medications for pain?   No    Are you taking methadone or Suboxone?  No    Do you have a history of malignant hyperthermia or adverse reaction to anesthesia?  No    (Females) Are you currently pregnant?   No     Have you been diagnosed or told you have pulmonary hypertension?   No    Do you have an LVAD?  No    Have you been told you have moderate to severe sleep apnea?  No    Have you been told you have COPD, asthma, or any other lung disease?  No    Do you have any heart conditions?  No     Have you ever had or are you awaiting a heart or lung transplant?   No    Have you had a stroke or transient ischemic attack (TIA aka \"mini stroke\" in the last 6 months?   No    Have you been diagnosed with or been told you have cirrhosis of the liver?   No    Are you currently on dialysis?   No    Do you need assistance transferring?   No    BMI: Estimated body mass index is 20.89 kg/m  as calculated from the following:    Height as of 8/24/23: 1.829 m (6').    Weight as of 8/24/23: 69.9 kg (154 lb).     Is patients BMI > 40 and scheduling location [facility]?  No    Do you take the medication Phentermine, Ozempic or Wegovy?  No    Do you take the medication Naltrexone?  No    Do you take blood thinners?  No      Prep   Are you currently on dialysis or do you have chronic kidney disease?  No    Do you have a diagnosis of " diabetes?  No    Do you have a diagnosis of cystic fibrosis (CF)?  No    On a regular basis do you go 3 -5 days between bowel movements?  Yes (Extended Prep)    BMI > 40?  No    Preferred Pharmacy:    [id number] - [place], [place] - [address]  [address]  [place] [address]  Phone: [phone] Fax: [phone]      Final Scheduling Details   Colonoscopy prep sent?  Golytely Extended Prep    Procedure scheduled  Colonoscopy    Surgeon:  Wayne     Date of procedure:  12/26     Schedule PAC:   No    Location  [facility] [place]    Sedation   MAC/Deep Sedation    Patient Reminders:   You will receive a call from a Nurse to review instructions and health history.  This assessment must be completed prior to your procedure.  Failure to complete the Nurse assessment may result in the procedure being cancelled.      On the day of your procedure, please designate an adult(s) who can drive you home stay with you for the next 24 hours. The medicines used in the exam will make you sleepy. You will not be able to drive.      You cannot take public transportation, ride share services, or non-medical taxi service without a responsible caregiver.  Medical transport services are allowed with the requirement that a responsible caregiver will receive you at your destination.  We require that drivers and caregivers are confirmed prior to your procedure.

## 2023-10-09 ENCOUNTER — ANCILLARY PROCEDURE (OUTPATIENT)
Dept: ULTRASOUND IMAGING | Facility: HOSPITAL | Age: 53
DRG: 917 | End: 2023-10-09
Attending: EMERGENCY MEDICINE
Payer: COMMERCIAL

## 2023-10-09 ENCOUNTER — APPOINTMENT (OUTPATIENT)
Dept: RADIOLOGY | Facility: HOSPITAL | Age: 53
DRG: 917 | End: 2023-10-09
Attending: EMERGENCY MEDICINE
Payer: COMMERCIAL

## 2023-10-09 ENCOUNTER — HOSPITAL ENCOUNTER (INPATIENT)
Facility: HOSPITAL | Age: 53
LOS: 3 days | Discharge: HOME HEALTH | DRG: 917 | End: 2023-10-12
Attending: EMERGENCY MEDICINE | Admitting: STUDENT IN AN ORGANIZED HEALTH CARE EDUCATION/TRAINING PROGRAM
Payer: COMMERCIAL

## 2023-10-09 ENCOUNTER — APPOINTMENT (OUTPATIENT)
Dept: CT IMAGING | Facility: HOSPITAL | Age: 53
DRG: 917 | End: 2023-10-09
Attending: EMERGENCY MEDICINE
Payer: COMMERCIAL

## 2023-10-09 DIAGNOSIS — R41.82 ALTERED MENTAL STATUS, UNSPECIFIED ALTERED MENTAL STATUS TYPE: ICD-10-CM
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
## 2023-10-09 DIAGNOSIS — J96.02 ACUTE RESPIRATORY FAILURE WITH HYPOXIA AND HYPERCAPNIA (H): ICD-10-CM

## 2023-10-09 DIAGNOSIS — J69.0 ASPIRATION PNEUMONIA, UNSPECIFIED ASPIRATION PNEUMONIA TYPE, UNSPECIFIED LATERALITY, UNSPECIFIED PART OF LUNG (H): ICD-10-CM

## 2023-10-09 DIAGNOSIS — J96.01 ACUTE RESPIRATORY FAILURE WITH HYPOXIA AND HYPERCAPNIA (H): ICD-10-CM

## 2023-10-09 LAB
ABO/RH(D): NORMAL
ALBUMIN SERPL BCG-MCNC: 3.7 G/DL (ref 3.5–5.2)
ALP SERPL-CCNC: 72 U/L (ref 40–129)
ALT SERPL W P-5'-P-CCNC: 15 U/L (ref 0–70)
ANION GAP SERPL CALCULATED.3IONS-SCNC: 13 MMOL/L (ref 7–15)
ANTIBODY SCREEN: NEGATIVE
APTT PPP: 30 SECONDS (ref 22–38)
AST SERPL W P-5'-P-CCNC: 58 U/L (ref 0–45)
BASO+EOS+MONOS # BLD AUTO: ABNORMAL 10*3/UL
BASO+EOS+MONOS NFR BLD AUTO: ABNORMAL %
BASOPHILS # BLD AUTO: 0 10E3/UL (ref 0–0.2)
BASOPHILS NFR BLD AUTO: 0 %
BILIRUB SERPL-MCNC: 1.2 MG/DL
BUN SERPL-MCNC: 21.7 MG/DL (ref 6–20)
CALCIUM SERPL-MCNC: 9.6 MG/DL (ref 8.6–10)
CHLORIDE SERPL-SCNC: 100 MMOL/L (ref 98–107)
CREAT SERPL-MCNC: 0.94 MG/DL (ref 0.67–1.17)
DEPRECATED HCO3 PLAS-SCNC: 23 MMOL/L (ref 22–29)
EGFRCR SERPLBLD CKD-EPI 2021: >90 ML/MIN/1.73M2
EOSINOPHIL # BLD AUTO: 0 10E3/UL (ref 0–0.7)
EOSINOPHIL NFR BLD AUTO: 0 %
ERYTHROCYTE [DISTWIDTH] IN BLOOD BY AUTOMATED COUNT: 13 % (ref 10–15)
ETHANOL SERPL-MCNC: <0.01 G/DL
FLUAV RNA SPEC QL NAA+PROBE: NEGATIVE
FLUBV RNA RESP QL NAA+PROBE: NEGATIVE
GLUCOSE SERPL-MCNC: 133 MG/DL (ref 70–99)
HCT VFR BLD AUTO: 39.7 % (ref 40–53)
HGB BLD-MCNC: 13.2 G/DL (ref 13.3–17.7)
IMM GRANULOCYTES # BLD: 0 10E3/UL
IMM GRANULOCYTES NFR BLD: 0 %
INR PPP: 1.11 (ref 0.85–1.15)
LACTATE SERPL-SCNC: 1.8 MMOL/L (ref 0.7–2)
LACTATE SERPL-SCNC: 2.4 MMOL/L (ref 0.7–2)
LYMPHOCYTES # BLD AUTO: 1.1 10E3/UL (ref 0.8–5.3)
LYMPHOCYTES NFR BLD AUTO: 16 %
MCH RBC QN AUTO: 31.1 PG (ref 26.5–33)
MCHC RBC AUTO-ENTMCNC: 33.2 G/DL (ref 31.5–36.5)
MCV RBC AUTO: 94 FL (ref 78–100)
MONOCYTES # BLD AUTO: 0.9 10E3/UL (ref 0–1.3)
MONOCYTES NFR BLD AUTO: 12 %
NEUTROPHILS # BLD AUTO: 4.9 10E3/UL (ref 1.6–8.3)
NEUTROPHILS NFR BLD AUTO: 72 %
NRBC # BLD AUTO: 0 10E3/UL
NRBC BLD AUTO-RTO: 0 /100
PLATELET # BLD AUTO: 154 10E3/UL (ref 150–450)
POTASSIUM SERPL-SCNC: 3.8 MMOL/L (ref 3.4–5.3)
PROT SERPL-MCNC: 7.5 G/DL (ref 6.4–8.3)
RBC # BLD AUTO: 4.24 10E6/UL (ref 4.4–5.9)
RSV RNA SPEC NAA+PROBE: NEGATIVE
SARS-COV-2 RNA RESP QL NAA+PROBE: NEGATIVE
SODIUM SERPL-SCNC: 136 MMOL/L (ref 135–145)
SPECIMEN EXPIRATION DATE: NORMAL
TROPONIN T SERPL HS-MCNC: 11 NG/L
TROPONIN T SERPL HS-MCNC: 11 NG/L
WBC # BLD AUTO: 7 10E3/UL (ref 4–11)

## 2023-10-09 PROCEDURE — 83605 ASSAY OF LACTIC ACID: CPT | Performed by: EMERGENCY MEDICINE

## 2023-10-09 PROCEDURE — 85610 PROTHROMBIN TIME: CPT | Performed by: EMERGENCY MEDICINE

## 2023-10-09 PROCEDURE — 250N000011 HC RX IP 250 OP 636: Mod: JZ | Performed by: STUDENT IN AN ORGANIZED HEALTH CARE EDUCATION/TRAINING PROGRAM

## 2023-10-09 PROCEDURE — 86850 RBC ANTIBODY SCREEN: CPT | Performed by: EMERGENCY MEDICINE

## 2023-10-09 PROCEDURE — 999N000104 CT LUMBAR SPINE RECONSTRUCTED

## 2023-10-09 PROCEDURE — 250N000011 HC RX IP 250 OP 636: Mod: JZ | Performed by: EMERGENCY MEDICINE

## 2023-10-09 PROCEDURE — 70450 CT HEAD/BRAIN W/O DYE: CPT

## 2023-10-09 PROCEDURE — 999N000104 CT THORACIC SPINE RECONSTRUCTED

## 2023-10-09 PROCEDURE — 72170 X-RAY EXAM OF PELVIS: CPT

## 2023-10-09 PROCEDURE — 85025 COMPLETE CBC W/AUTO DIFF WBC: CPT | Performed by: EMERGENCY MEDICINE

## 2023-10-09 PROCEDURE — 86901 BLOOD TYPING SEROLOGIC RH(D): CPT | Performed by: EMERGENCY MEDICINE

## 2023-10-09 PROCEDURE — 74177 CT ABD & PELVIS W/CONTRAST: CPT

## 2023-10-09 PROCEDURE — 84484 ASSAY OF TROPONIN QUANT: CPT | Performed by: EMERGENCY MEDICINE

## 2023-10-09 PROCEDURE — 93005 ELECTROCARDIOGRAM TRACING: CPT | Performed by: EMERGENCY MEDICINE

## 2023-10-09 PROCEDURE — 85730 THROMBOPLASTIN TIME PARTIAL: CPT | Performed by: EMERGENCY MEDICINE

## 2023-10-09 PROCEDURE — 72125 CT NECK SPINE W/O DYE: CPT

## 2023-10-09 PROCEDURE — 258N000003 HC RX IP 258 OP 636: Performed by: EMERGENCY MEDICINE

## 2023-10-09 PROCEDURE — 80053 COMPREHEN METABOLIC PANEL: CPT | Performed by: EMERGENCY MEDICINE

## 2023-10-09 PROCEDURE — 120N000001 HC R&B MED SURG/OB

## 2023-10-09 PROCEDURE — 71045 X-RAY EXAM CHEST 1 VIEW: CPT

## 2023-10-09 PROCEDURE — 82565 ASSAY OF CREATININE: CPT | Performed by: STUDENT IN AN ORGANIZED HEALTH CARE EDUCATION/TRAINING PROGRAM

## 2023-10-09 PROCEDURE — 82077 ASSAY SPEC XCP UR&BREATH IA: CPT | Performed by: EMERGENCY MEDICINE

## 2023-10-09 PROCEDURE — 99223 1ST HOSP IP/OBS HIGH 75: CPT | Performed by: STUDENT IN AN ORGANIZED HEALTH CARE EDUCATION/TRAINING PROGRAM

## 2023-10-09 PROCEDURE — 36415 COLL VENOUS BLD VENIPUNCTURE: CPT | Performed by: EMERGENCY MEDICINE

## 2023-10-09 PROCEDURE — 87637 SARSCOV2&INF A&B&RSV AMP PRB: CPT | Performed by: EMERGENCY MEDICINE

## 2023-10-09 RX ORDER — AMPICILLIN AND SULBACTAM 2; 1 G/1; G/1
3 INJECTION, POWDER, FOR SOLUTION INTRAMUSCULAR; INTRAVENOUS EVERY 6 HOURS
Status: DISCONTINUED | OUTPATIENT
Start: 2023-10-09 | End: 2023-10-10

## 2023-10-09 RX ORDER — SODIUM CHLORIDE 9 MG/ML
INJECTION, SOLUTION INTRAVENOUS CONTINUOUS
Status: DISCONTINUED | OUTPATIENT
Start: 2023-10-09 | End: 2023-10-10

## 2023-10-09 RX ORDER — ATORVASTATIN CALCIUM 40 MG/1
80 TABLET, FILM COATED ORAL EVERY EVENING
Status: DISCONTINUED | OUTPATIENT
Start: 2023-10-09 | End: 2023-10-12 | Stop reason: HOSPADM

## 2023-10-09 RX ORDER — QUETIAPINE FUMARATE 100 MG/1
200 TABLET, FILM COATED ORAL AT BEDTIME
Status: DISCONTINUED | OUTPATIENT
Start: 2023-10-09 | End: 2023-10-09

## 2023-10-09 RX ORDER — QUETIAPINE FUMARATE 100 MG/1
300 TABLET, FILM COATED ORAL AT BEDTIME
Status: DISCONTINUED | OUTPATIENT
Start: 2023-10-09 | End: 2023-10-09

## 2023-10-09 RX ORDER — LIDOCAINE 40 MG/G
CREAM TOPICAL
Status: DISCONTINUED | OUTPATIENT
Start: 2023-10-09 | End: 2023-10-12 | Stop reason: HOSPADM

## 2023-10-09 RX ORDER — DIVALPROEX SODIUM 500 MG/1
500 TABLET, EXTENDED RELEASE ORAL AT BEDTIME
Status: DISCONTINUED | OUTPATIENT
Start: 2023-10-09 | End: 2023-10-11 | Stop reason: ALTCHOICE

## 2023-10-09 RX ORDER — CEFTRIAXONE 1 G/1
1 INJECTION, POWDER, FOR SOLUTION INTRAMUSCULAR; INTRAVENOUS ONCE
Status: COMPLETED | OUTPATIENT
Start: 2023-10-09 | End: 2023-10-09

## 2023-10-09 RX ORDER — IOPAMIDOL 755 MG/ML
90 INJECTION, SOLUTION INTRAVASCULAR ONCE
Status: COMPLETED | OUTPATIENT
Start: 2023-10-09 | End: 2023-10-09

## 2023-10-09 RX ADMIN — CEFTRIAXONE SODIUM 1 G: 1 INJECTION, POWDER, FOR SOLUTION INTRAMUSCULAR; INTRAVENOUS at 21:10

## 2023-10-09 RX ADMIN — SODIUM CHLORIDE 1000 ML: 9 INJECTION, SOLUTION INTRAVENOUS at 19:21

## 2023-10-09 RX ADMIN — AZITHROMYCIN MONOHYDRATE 500 MG: 500 INJECTION, POWDER, LYOPHILIZED, FOR SOLUTION INTRAVENOUS at 22:14

## 2023-10-09 RX ADMIN — AMPICILLIN SODIUM AND SULBACTAM SODIUM 3 G: 2; 1 INJECTION, POWDER, FOR SOLUTION INTRAMUSCULAR; INTRAVENOUS at 23:32

## 2023-10-09 RX ADMIN — IOPAMIDOL 90 ML: 755 INJECTION, SOLUTION INTRAVENOUS at 20:06

## 2023-10-09 RX ADMIN — SODIUM CHLORIDE 1000 ML: 9 INJECTION, SOLUTION INTRAVENOUS at 19:32

## 2023-10-09 ASSESSMENT — ACTIVITIES OF DAILY LIVING (ADL)
ADLS_ACUITY_SCORE: 35

## 2023-10-09 ASSESSMENT — ENCOUNTER SYMPTOMS
WOUND: 1
CONFUSION: 1

## 2023-10-09 NOTE — ED PROVIDER NOTES
EMERGENCY DEPARTMENT ENCOUNTER      NAME: Tripp Sanders  AGE: 53 year old male  YOB: 1970  MRN: [id number]  EVALUATION DATE & TIME: 10/9/2023  6:23 PM    PCP: Telly Arias    ED PROVIDER: Ryan Kitchen MD        Chief Complaint   Patient presents with    Fall         FINAL IMPRESSION:  1. Aspiration pneumonia, unspecified aspiration pneumonia type, unspecified laterality, unspecified part of lung (H)    2. Acute respiratory failure with hypoxia and hypercapnia (H)    3. Fall, initial encounter    4. Altered mental status, unspecified altered mental status type          ED COURSE & MEDICAL DECISION MAKING:    Pertinent Labs & Imaging studies reviewed. (See chart for details)  53 year old male presents to the Emergency Department for evaluation of change in mental status last known well 2 days ago, and fall.  Hypotensive in triage.  Shortly upon being roomed in room 1 blood pressure normalized.  Family reports his mental status seems to be little bit different than baseline    ED Course as of 10/09/23 2208   Mon Oct 09, 2023   1903 Patient presents hypotensive after a fall.  Patient immediately back from triage to room 1 for resuscitation.  Patient's airways patent, bilateral breath sounds, weak radial pulses and cool extremities.  GCS 14 for confusion, eyes open, follows commands   1904 He appears to have left-sided facial weakness and left arm weakness with contracture and left leg contracture   1904 Trauma activation not initiated because on repeat blood pressure blood pressure normalized.  Portable x-ray ordered and portable pelvic x-ray ordered.   1904 Bedside POCUS without pneumothorax or significant pericardial effusion or intra-abdominal fluid   1904 Plan for CT head neck, CT chest and pelvis, CT spine.  Differential includes another stroke, intracranial hemorrhage, sepsis, pneumothorax, anemia, dehydration, intra-abdominal hemorrhage   1905 Patient's family is poor historians.  They report  he is wheelchair-bound and has had a previous stroke.  Per chart review previous right MCA with left hemiplegia was explained exam   1905 With the patient hypotensive and cold and delayed cap refill plan for rectal temperature which was normal   1905 Plan for lactic acid, CMP, type and screen, CBC, INR, alcohol level, CT imaging and likely admission   1906 He is on propranolol which would explain why he is not tachycardic despite being hypotensive   1906 No known overdose on propranolol   1906 Stroke code not initiated based on last known well being 2 days ago   1906 C-collar ordered but patient will not wear it   1907 Family at bedside states known concern for alcohol or drug use.  Chart review says history of alcohol and drug use   2204 CT image without fracture or intracranial hemorrhage but does show changes just of aspiration pneumonia.  Mildly hypoxic.  Hypotension resolved with IV fluids.  Will give ceftriaxone and azithromycin for possible aspiration pneumonia   2205 Based on history and exam emergent MRI is not indicated.  Patient not a stroke code candidate   2205 Troponin T, High Sensitivity: 11  Delta troponin ordered   2205 WBC: 7.0   2205 Hemoglobin(!): 13.2   2205 Platelet Count: 154   2205 Sodium: 136   2205 Glucose(!): 133   2205 AST(!): 58   2205 Alcohol ethyl: <0.01   2207 Spoke with Dr. Alexander who agreed to admit patient to [facility]     6:28 PM I met with the patient for the initial interview and physical examination. Discussed plan for treatment and workup in the ED.           Medical Decision Making    History:  Supplemental history from: Documented in chart, if applicable  External Record(s) reviewed: Documented in chart, if applicable.    Work Up:  Chart documentation includes differential considered and any EKGs or imaging independently interpreted by provider, where specified.  In additional to work up documented, I considered the following work up: Documented in chart, if  applicable.    External consultation:  Discussion of management with another provider: Documented in chart, if applicable    Complicating factors:  Care impacted by chronic illness: Mental Health and Other: stroke, hemiplegia  Care affected by social determinants of health: Alcohol Abuse and/or Recreational Drug Use    Disposition considerations: Admit.          Voice recognition software was used in the creation of this note. Any grammatical or nonsensical errors are due to inherent errors with the software and are not the intention of the writer.         At the conclusion of the encounter I discussed the results of all of the tests and the disposition. The questions were answered. The patient or family acknowledged understanding and was agreeable with the care plan.           MEDICATIONS GIVEN IN THE EMERGENCY:  Medications   lidocaine 1 % 0.1-1 mL (has no administration in time range)   lidocaine (LMX4) cream (has no administration in time range)   sodium chloride (PF) 0.9% PF flush 3 mL (3 mLs Intracatheter $Given 10/9/23 1851)   sodium chloride (PF) 0.9% PF flush 3 mL (has no administration in time range)   azithromycin (ZITHROMAX) 500 mg in sodium chloride 0.9 % 250 mL intermittent infusion (has no administration in time range)   melatonin tablet 1 mg (has no administration in time range)   sodium chloride 0.9 % infusion (has no administration in time range)   ampicillin-sulbactam (UNASYN) 3 g vial to attach to  mL bag (has no administration in time range)   sodium chloride 0.9% BOLUS 1,000 mL (0 mLs Intravenous Stopped 10/9/23 2102)   sodium chloride 0.9% BOLUS 1,000 mL (0 mLs Intravenous Stopped 10/9/23 1930)   iopamidol (ISOVUE-370) solution 90 mL (90 mLs Intravenous $Given 10/9/23 2006)   cefTRIAXone (ROCEPHIN) 1 g vial to attach to  mL bag for ADULTS or NS 50 mL bag for PEDS (1 g Intravenous $New Bag 10/9/23 2110)       NEW PRESCRIPTIONS STARTED AT TODAY'S ER VISIT  New Prescriptions    No  "medications on file          =================================================================    HPI    Triage note  \" \"      Patient information was obtained from: patient's roommate    Use of : N/A     Tripp Sanders is a 53 year old male with a pertinent history of right MCA stroke, left hemiplegia, schizoaffective disorder, alcohol abuse, drug abuse,  who presents to this ED via personal vehicle with roommate for evaluation of fall.     Nurse reports the patient fell today and hit his head sustaining a left eyebrow laceration. Patient has been having an abnormal mentation.     Patient's brother reports the patient had 2 unwitnessed falls today and was found by his roommates. They reports he is wheel chair bound. Patient was last well a couple days ago. Patient does not take all of his medications regularly.         HPI is limited because the patient and family are poor historians.     REVIEW OF SYSTEMS   Review of Systems   Skin:  Positive for wound (left eyebrow lac).   Psychiatric/Behavioral:  Positive for confusion.         PAST MEDICAL HISTORY:  History reviewed. No pertinent past medical history.    PAST SURGICAL HISTORY:  History reviewed. No pertinent surgical history.        CURRENT MEDICATIONS:    atorvastatin (LIPITOR) 80 MG tablet  divalproex sodium extended-release (DEPAKOTE ER) 500 MG 24 hr tablet  propranolol ER (INDERAL LA) 60 MG 24 hr capsule  QUEtiapine (SEROQUEL) 200 MG tablet  QUEtiapine (SEROQUEL) 300 MG tablet        ALLERGIES:  Allergies   Allergen Reactions    Trazodone Unknown     priapism       FAMILY HISTORY:  History reviewed. No pertinent family history.    SOCIAL HISTORY:   Social History     Socioeconomic History    Marital status: Single   Tobacco Use    Smoking status: Never    Smokeless tobacco: Never    Tobacco comments:     no passive exposure   Vaping Use    Vaping Use: Never used    Passive vaping exposure: Yes   Substance and Sexual Activity    Alcohol use: Not " "Currently     Alcohol/week: 17.0 standard drinks of alcohol     Comment: Alcoholic Drinks/day: Quit 11/15/2019    Drug use: Yes     Types: Marijuana     Comment: Drug use: To help with sleep. Take 3 times a week    Sexual activity: Not Currently     Partners: Female       VITALS:  /70   Pulse 77   Temp 98.4  F (36.9  C) (Rectal)   Resp 25   Ht 1.676 m (5' 6\")   Wt 70 kg (154 lb 5.2 oz)   SpO2 94%   BMI 24.91 kg/m      PHYSICAL EXAM      Vitals: /70   Pulse 77   Temp 98.4  F (36.9  C) (Rectal)   Resp 25   Ht 1.676 m (5' 6\")   Wt 70 kg (154 lb 5.2 oz)   SpO2 94%   BMI 24.91 kg/m    General: Ill-appearing, dry mucous membranes  Eyes: Conjunctivae non-injected. Sclera anicteric.  HENT: Atraumatic.  Neck: Supple.  Respiratory/Chest: Respiration unlabored.  No wheezing or crackles  Heart: Cooler extremities with delayed cap refill.  No murmurs  Abdomen: non distended.  No abdominal tenderness  Musculoskeletal: Contracture left lower leg and left upper extremity  Skin: Normal color. No rash or diaphoresis.  Neurologic: Left facial palsy, left arm contracture, left leg contracture, left arm in length leg weakness.  Slurred speech, difficult to understand patient  Psychiatric: not oriented to year       LAB:  All pertinent labs reviewed and interpreted.  Results for orders placed or performed during the hospital encounter of 10/09/23   XR Chest Port 1 View    Impression    IMPRESSION: Negative chest.   XR Pelvis Port 1/2 Views    Impression    IMPRESSION: Limited evaluation due to osteopenia, positioning, and overlying bowel gas. No definite fracture is identified. There is normal joint alignment. Mild degenerative changes throughout the pelvis.   CT Chest/Abdomen/Pelvis w Contrast    Impression    IMPRESSION:  1.  Peribronchial infiltrates in both lungs consistent with pneumonia or aspiration pneumonitis.  2.  No evidence of an acute traumatic injury in the chest, abdomen, or pelvis.   Head CT w/o " contrast    Impression    IMPRESSION:  HEAD CT:  1.  No acute intracranial abnormality or acute chondral fracture.  2.  Moderate zone of encephalomalacia and marginal gliosis in the right cerebral hemisphere as described above, most consistent with chronic right MCA territory infarction.  3.  Additional background mild to moderate chronic age-related changes.    CERVICAL SPINE CT:  1.  No CT evidence for acute fracture or post traumatic subluxation.  2.  No high-grade spinal canal or neuroforaminal stenosis.    Cervical spine CT w/o contrast    Impression    IMPRESSION:  HEAD CT:  1.  No acute intracranial abnormality or acute chondral fracture.  2.  Moderate zone of encephalomalacia and marginal gliosis in the right cerebral hemisphere as described above, most consistent with chronic right MCA territory infarction.  3.  Additional background mild to moderate chronic age-related changes.    CERVICAL SPINE CT:  1.  No CT evidence for acute fracture or post traumatic subluxation.  2.  No high-grade spinal canal or neuroforaminal stenosis.    CT Thoracic Spine Reconstructed    Impression    IMPRESSION:  THORACIC SPINE:  1.  No acute fracture or traumatic subluxation of the thoracic spine by CT imaging.  2.  No high-grade spinal canal or neuroforaminal stenosis.  3.  Please see dedicated chest CT report for pulmonary and thoracic soft tissue findings.    LUMBAR SPINE:  1.  No acute fracture or traumatic subluxation of the lumbar spine by CT imaging.  2.  No high-grade spinal canal or neuroforaminal stenosis.  3.  Please see dedicated abdomen/pelvis CT report for abdominal and pelvic soft tissue findings.     CT Lumbar Spine Reconstructed    Impression    IMPRESSION:  THORACIC SPINE:  1.  No acute fracture or traumatic subluxation of the thoracic spine by CT imaging.  2.  No high-grade spinal canal or neuroforaminal stenosis.  3.  Please see dedicated chest CT report for pulmonary and thoracic soft tissue  findings.    LUMBAR SPINE:  1.  No acute fracture or traumatic subluxation of the lumbar spine by CT imaging.  2.  No high-grade spinal canal or neuroforaminal stenosis.  3.  Please see dedicated abdomen/pelvis CT report for abdominal and pelvic soft tissue findings.     Comprehensive metabolic panel   Result Value Ref Range    Sodium 136 135 - 145 mmol/L    Potassium 3.8 3.4 - 5.3 mmol/L    Carbon Dioxide (CO2) 23 22 - 29 mmol/L    Anion Gap 13 7 - 15 mmol/L    Urea Nitrogen 21.7 (H) 6.0 - 20.0 mg/dL    Creatinine 0.94 0.67 - 1.17 mg/dL    GFR Estimate >90 >60 mL/min/1.73m2    Calcium 9.6 8.6 - 10.0 mg/dL    Chloride 100 98 - 107 mmol/L    Glucose 133 (H) 70 - 99 mg/dL    Alkaline Phosphatase 72 40 - 129 U/L    AST 58 (H) 0 - 45 U/L    ALT 15 0 - 70 U/L    Protein Total 7.5 6.4 - 8.3 g/dL    Albumin 3.7 3.5 - 5.2 g/dL    Bilirubin Total 1.2 <=1.2 mg/dL   Result Value Ref Range    INR 1.11 0.85 - 1.15   Partial thromboplastin time   Result Value Ref Range    aPTT 30 22 - 38 Seconds   Result Value Ref Range    Alcohol ethyl <0.01 <=0.01 g/dL   Lactic acid whole blood   Result Value Ref Range    Lactic Acid 2.4 (H) 0.7 - 2.0 mmol/L   Troponin T, High Sensitivity (now)   Result Value Ref Range    Troponin T, High Sensitivity 11 <=22 ng/L   CBC with platelets and differential   Result Value Ref Range    WBC Count 7.0 4.0 - 11.0 10e3/uL    RBC Count 4.24 (L) 4.40 - 5.90 10e6/uL    Hemoglobin 13.2 (L) 13.3 - 17.7 g/dL    Hematocrit 39.7 (L) 40.0 - 53.0 %    MCV 94 78 - 100 fL    MCH 31.1 26.5 - 33.0 pg    MCHC 33.2 31.5 - 36.5 g/dL    RDW 13.0 10.0 - 15.0 %    Platelet Count 154 150 - 450 10e3/uL    % Neutrophils 72 %    % Lymphocytes 16 %    % Monocytes 12 %    Mids % (Monos, Eos, Basos)      % Eosinophils 0 %    % Basophils 0 %    % Immature Granulocytes 0 %    NRBCs per 100 WBC 0 <1 /100    Absolute Neutrophils 4.9 1.6 - 8.3 10e3/uL    Absolute Lymphocytes 1.1 0.8 - 5.3 10e3/uL    Absolute Monocytes 0.9 0.0 - 1.3  10e3/uL    Mids Abs (Monos, Eos, Basos)      Absolute Eosinophils 0.0 0.0 - 0.7 10e3/uL    Absolute Basophils 0.0 0.0 - 0.2 10e3/uL    Absolute Immature Granulocytes 0.0 <=0.4 10e3/uL    Absolute NRBCs 0.0 10e3/uL   Lactic acid whole blood   Result Value Ref Range    Lactic Acid 1.8 0.7 - 2.0 mmol/L   Adult Type and Screen   Result Value Ref Range    ABO/RH(D) A POS     Antibody Screen Negative Negative    SPECIMEN EXPIRATION DATE [id number]        RADIOLOGY:  Reviewed all pertinent imaging. Please see official radiology report.  CT Chest/Abdomen/Pelvis w Contrast   Final Result   IMPRESSION:   1.  Peribronchial infiltrates in both lungs consistent with pneumonia or aspiration pneumonitis.   2.  No evidence of an acute traumatic injury in the chest, abdomen, or pelvis.      CT Lumbar Spine Reconstructed   Final Result   IMPRESSION:   THORACIC SPINE:   1.  No acute fracture or traumatic subluxation of the thoracic spine by CT imaging.   2.  No high-grade spinal canal or neuroforaminal stenosis.   3.  Please see dedicated chest CT report for pulmonary and thoracic soft tissue findings.      LUMBAR SPINE:   1.  No acute fracture or traumatic subluxation of the lumbar spine by CT imaging.   2.  No high-grade spinal canal or neuroforaminal stenosis.   3.  Please see dedicated abdomen/pelvis CT report for abdominal and pelvic soft tissue findings.         CT Thoracic Spine Reconstructed   Final Result   IMPRESSION:   THORACIC SPINE:   1.  No acute fracture or traumatic subluxation of the thoracic spine by CT imaging.   2.  No high-grade spinal canal or neuroforaminal stenosis.   3.  Please see dedicated chest CT report for pulmonary and thoracic soft tissue findings.      LUMBAR SPINE:   1.  No acute fracture or traumatic subluxation of the lumbar spine by CT imaging.   2.  No high-grade spinal canal or neuroforaminal stenosis.   3.  Please see dedicated abdomen/pelvis CT report for abdominal and pelvic soft tissue  findings.         Cervical spine CT w/o contrast   Final Result   IMPRESSION:   HEAD CT:   1.  No acute intracranial abnormality or acute chondral fracture.   2.  Moderate zone of encephalomalacia and marginal gliosis in the right cerebral hemisphere as described above, most consistent with chronic right MCA territory infarction.   3.  Additional background mild to moderate chronic age-related changes.      CERVICAL SPINE CT:   1.  No CT evidence for acute fracture or post traumatic subluxation.   2.  No high-grade spinal canal or neuroforaminal stenosis.       Head CT w/o contrast   Final Result   IMPRESSION:   HEAD CT:   1.  No acute intracranial abnormality or acute chondral fracture.   2.  Moderate zone of encephalomalacia and marginal gliosis in the right cerebral hemisphere as described above, most consistent with chronic right MCA territory infarction.   3.  Additional background mild to moderate chronic age-related changes.      CERVICAL SPINE CT:   1.  No CT evidence for acute fracture or post traumatic subluxation.   2.  No high-grade spinal canal or neuroforaminal stenosis.       XR Chest Port 1 View   Final Result   IMPRESSION: Negative chest.      XR Pelvis Port 1/2 Views   Final Result   IMPRESSION: Limited evaluation due to osteopenia, positioning, and overlying bowel gas. No definite fracture is identified. There is normal joint alignment. Mild degenerative changes throughout the pelvis.      POC US ABDOMEN LIMITED    (Results Pending)   POC US ABDOMEN LIMITED    (Results Pending)     EKG:    Performed at: 10/9/23 6:29 PM    Impression: sinus rhythm. Right atrial enlargement. Nonspecific ST abnormality  Rate: 92 BPM  Rhythm: Sinus  OK Interval: 140 ms  QRS Interval: 80 ms  QTc Interval: 447 ms  Comparison: 2/12/22 no significant changes were found     I have independently reviewed and interpreted the EKG(s) documented above.     PROCEDURES:     POC US ABDOMEN LIMITED    Date/Time: 10/9/2023 10:06  PM    Performed by: Rubén Kitchen MD  Authorized by: Rubén Kitchen MD    Procedure details:     Indications comment:  Hypotension  Comments:      E-FAST negative.  Images saved and uploaded.  Technically difficult exam  POC US ABDOMEN LIMITED    Date/Time: 10/9/2023 10:06 PM    Performed by: Rubén Kitchen MD  Authorized by: Rubén Kitchen MD    Comments:      E-FAST negative.  Technically difficult examination.  Images uploaded          I, Mirella Adhikari, am serving as a scribe to document services personally performed by Rubén Kitchen MD based on my observation and the provider's statements to me. I, Dr. Rubén Kitchen, attest that Mirella Adhikari is acting in a scribe capacity, has observed my performance of the services and has documented them in accordance with my direction.    Rubén Kitchen MD  Emergency Medicine  [facility] EMERGENCY DEPARTMENT  [address] [place] [id number]  [phone]       Rubén Kitchen MD  10/09/23 3145

## 2023-10-09 NOTE — ED NOTES
Attempted to place C-Collar, but patient grabbing at collar and agitated with it. Dr Kitchen aware.

## 2023-10-09 NOTE — ED TRIAGE NOTES
Pt brought in by family. Initially stated son and family friend- changed later to brother. They stated pt lives with cousin but then confirmed they also live there. Pt has had multiple falls. Hit head at least once. State pt has been more somnolent. Pt drooling in triage and hypotensive. Pt and family are hard to understand and very poor historians. States pt has had a cough for a week also.   Low bp in triage and cold to touch. Pt brought immediately back to room 1 to finish triage.   Dr. Kitchen met us in the room along with nursing staff. Trauma alert called. No thinners. Lac above left brow.

## 2023-10-10 ENCOUNTER — APPOINTMENT (OUTPATIENT)
Dept: OCCUPATIONAL THERAPY | Facility: HOSPITAL | Age: 53
DRG: 917 | End: 2023-10-10
Attending: STUDENT IN AN ORGANIZED HEALTH CARE EDUCATION/TRAINING PROGRAM
Payer: COMMERCIAL

## 2023-10-10 ENCOUNTER — APPOINTMENT (OUTPATIENT)
Dept: SPEECH THERAPY | Facility: HOSPITAL | Age: 53
DRG: 917 | End: 2023-10-10
Attending: STUDENT IN AN ORGANIZED HEALTH CARE EDUCATION/TRAINING PROGRAM
Payer: COMMERCIAL

## 2023-10-10 ENCOUNTER — APPOINTMENT (OUTPATIENT)
Dept: PHYSICAL THERAPY | Facility: HOSPITAL | Age: 53
DRG: 917 | End: 2023-10-10
Attending: STUDENT IN AN ORGANIZED HEALTH CARE EDUCATION/TRAINING PROGRAM
Payer: COMMERCIAL

## 2023-10-10 LAB
ANION GAP SERPL CALCULATED.3IONS-SCNC: 8 MMOL/L (ref 7–15)
BUN SERPL-MCNC: 17.3 MG/DL (ref 6–20)
CALCIUM SERPL-MCNC: 8.8 MG/DL (ref 8.6–10)
CHLORIDE SERPL-SCNC: 108 MMOL/L (ref 98–107)
CREAT SERPL-MCNC: 0.69 MG/DL (ref 0.67–1.17)
CREAT SERPL-MCNC: 0.84 MG/DL (ref 0.67–1.17)
DEPRECATED HCO3 PLAS-SCNC: 24 MMOL/L (ref 22–29)
EGFRCR SERPLBLD CKD-EPI 2021: >90 ML/MIN/1.73M2
EGFRCR SERPLBLD CKD-EPI 2021: >90 ML/MIN/1.73M2
ERYTHROCYTE [DISTWIDTH] IN BLOOD BY AUTOMATED COUNT: 13 % (ref 10–15)
GLUCOSE SERPL-MCNC: 91 MG/DL (ref 70–99)
HCT VFR BLD AUTO: 35.2 % (ref 40–53)
HGB BLD-MCNC: 11.7 G/DL (ref 13.3–17.7)
MCH RBC QN AUTO: 31.2 PG (ref 26.5–33)
MCHC RBC AUTO-ENTMCNC: 33.2 G/DL (ref 31.5–36.5)
MCV RBC AUTO: 94 FL (ref 78–100)
PLATELET # BLD AUTO: 145 10E3/UL (ref 150–450)
POTASSIUM SERPL-SCNC: 3.3 MMOL/L (ref 3.4–5.3)
POTASSIUM SERPL-SCNC: 3.9 MMOL/L (ref 3.4–5.3)
RBC # BLD AUTO: 3.75 10E6/UL (ref 4.4–5.9)
SODIUM SERPL-SCNC: 140 MMOL/L (ref 135–145)
WBC # BLD AUTO: 5.1 10E3/UL (ref 4–11)

## 2023-10-10 PROCEDURE — 97162 PT EVAL MOD COMPLEX 30 MIN: CPT | Mod: GP

## 2023-10-10 PROCEDURE — 97535 SELF CARE MNGMENT TRAINING: CPT | Mod: GO

## 2023-10-10 PROCEDURE — 250N000013 HC RX MED GY IP 250 OP 250 PS 637: Performed by: FAMILY MEDICINE

## 2023-10-10 PROCEDURE — 99232 SBSQ HOSP IP/OBS MODERATE 35: CPT | Performed by: FAMILY MEDICINE

## 2023-10-10 PROCEDURE — 97116 GAIT TRAINING THERAPY: CPT | Mod: GP

## 2023-10-10 PROCEDURE — 80048 BASIC METABOLIC PNL TOTAL CA: CPT | Performed by: STUDENT IN AN ORGANIZED HEALTH CARE EDUCATION/TRAINING PROGRAM

## 2023-10-10 PROCEDURE — 120N000001 HC R&B MED SURG/OB

## 2023-10-10 PROCEDURE — 97167 OT EVAL HIGH COMPLEX 60 MIN: CPT | Mod: GO

## 2023-10-10 PROCEDURE — 250N000011 HC RX IP 250 OP 636: Mod: JZ | Performed by: STUDENT IN AN ORGANIZED HEALTH CARE EDUCATION/TRAINING PROGRAM

## 2023-10-10 PROCEDURE — 250N000013 HC RX MED GY IP 250 OP 250 PS 637: Performed by: HOSPITALIST

## 2023-10-10 PROCEDURE — 85018 HEMOGLOBIN: CPT | Performed by: STUDENT IN AN ORGANIZED HEALTH CARE EDUCATION/TRAINING PROGRAM

## 2023-10-10 PROCEDURE — 250N000013 HC RX MED GY IP 250 OP 250 PS 637: Performed by: STUDENT IN AN ORGANIZED HEALTH CARE EDUCATION/TRAINING PROGRAM

## 2023-10-10 PROCEDURE — 36415 COLL VENOUS BLD VENIPUNCTURE: CPT | Performed by: HOSPITALIST

## 2023-10-10 PROCEDURE — 84132 ASSAY OF SERUM POTASSIUM: CPT | Performed by: HOSPITALIST

## 2023-10-10 PROCEDURE — 258N000003 HC RX IP 258 OP 636: Performed by: STUDENT IN AN ORGANIZED HEALTH CARE EDUCATION/TRAINING PROGRAM

## 2023-10-10 PROCEDURE — 36415 COLL VENOUS BLD VENIPUNCTURE: CPT | Performed by: STUDENT IN AN ORGANIZED HEALTH CARE EDUCATION/TRAINING PROGRAM

## 2023-10-10 PROCEDURE — 92610 EVALUATE SWALLOWING FUNCTION: CPT | Mod: GN

## 2023-10-10 PROCEDURE — 99222 1ST HOSP IP/OBS MODERATE 55: CPT | Mod: GC | Performed by: STUDENT IN AN ORGANIZED HEALTH CARE EDUCATION/TRAINING PROGRAM

## 2023-10-10 RX ORDER — POTASSIUM CHLORIDE 1.5 G/1.58G
40 POWDER, FOR SOLUTION ORAL ONCE
Status: COMPLETED | OUTPATIENT
Start: 2023-10-10 | End: 2023-10-10

## 2023-10-10 RX ORDER — ASPIRIN 81 MG/1
81 TABLET ORAL DAILY
Status: DISCONTINUED | OUTPATIENT
Start: 2023-10-10 | End: 2023-10-12 | Stop reason: HOSPADM

## 2023-10-10 RX ORDER — ENOXAPARIN SODIUM 100 MG/ML
40 INJECTION SUBCUTANEOUS EVERY 24 HOURS
Status: DISCONTINUED | OUTPATIENT
Start: 2023-10-10 | End: 2023-10-12 | Stop reason: HOSPADM

## 2023-10-10 RX ADMIN — POTASSIUM CHLORIDE 40 MEQ: 1.5 POWDER, FOR SOLUTION ORAL at 10:33

## 2023-10-10 RX ADMIN — AMPICILLIN SODIUM AND SULBACTAM SODIUM 3 G: 2; 1 INJECTION, POWDER, FOR SOLUTION INTRAMUSCULAR; INTRAVENOUS at 05:26

## 2023-10-10 RX ADMIN — AMPICILLIN SODIUM AND SULBACTAM SODIUM 3 G: 2; 1 INJECTION, POWDER, FOR SOLUTION INTRAMUSCULAR; INTRAVENOUS at 10:32

## 2023-10-10 RX ADMIN — Medication 81 MG: at 09:04

## 2023-10-10 RX ADMIN — ENOXAPARIN SODIUM 40 MG: 40 INJECTION SUBCUTANEOUS at 09:08

## 2023-10-10 RX ADMIN — QUETIAPINE FUMARATE 500 MG: 300 TABLET ORAL at 20:19

## 2023-10-10 RX ADMIN — SODIUM CHLORIDE: 9 INJECTION, SOLUTION INTRAVENOUS at 00:13

## 2023-10-10 RX ADMIN — DIVALPROEX SODIUM 500 MG: 500 TABLET, FILM COATED, EXTENDED RELEASE ORAL at 20:19

## 2023-10-10 RX ADMIN — ATORVASTATIN CALCIUM 80 MG: 40 TABLET, FILM COATED ORAL at 20:14

## 2023-10-10 RX ADMIN — AMOXICILLIN AND CLAVULANATE POTASSIUM 1 TABLET: 875; 125 TABLET, FILM COATED ORAL at 20:14

## 2023-10-10 ASSESSMENT — ACTIVITIES OF DAILY LIVING (ADL)
ADLS_ACUITY_SCORE: 41
ADLS_ACUITY_SCORE: 35
ADLS_ACUITY_SCORE: 32
ADLS_ACUITY_SCORE: 41
ADLS_ACUITY_SCORE: 41
DEPENDENT_IADLS:: CLEANING;COOKING;LAUNDRY;SHOPPING;MEAL PREPARATION;TRANSPORTATION
ADLS_ACUITY_SCORE: 41
ADLS_ACUITY_SCORE: 35

## 2023-10-10 NOTE — PROGRESS NOTES
SPEECH PATHOLOGY CLINICAL SWALLOW EVALUATION       10/10/23 1000   Appointment Info   Signing Clinician's Name / Credentials (SLP) Lokesh Ftizgerald MA [facility] SLP   General Information   Onset of Illness/Injury or Date of Surgery 10/10/23   Referring Physician Dwayne Jaimes MD   Patient/Family Therapy Goal Statement (SLP) to eat/drink   Pertinent History of Current Problem 53M presenting with subacute weakness, confusion; pmhx includes CVA (LUE weakness, dysphagia), schizoaffective-bipolar, GERD, alcohol dependence (in remission), anxiety; admitted for aspiration PNA.   General Observations Awake, baseline fast rate of speech creating some unintelliglbility. He states he eats fast too.   Type of Evaluation   Type of Evaluation Swallow Evaluation   Oral Motor   Oral Musculature generally intact   Structural Abnormalities none present   Mucosal Quality adequate   Dentition (Oral Motor)   Dentition (Oral Motor) natural dentition   Facial Symmetry (Oral Motor)   Facial Symmetry (Oral Motor) WNL   Lip Function (Oral Motor)   Lip Range of Motion (Oral Motor) WNL   Lip Strength (Oral Motor) WNL   Lip Coordination (Oral Motor) bilateral;minimal impairment   Tongue Function (Oral Motor)   Tongue Strength (Oral Motor) strength decreased;bilateral;minimal impairment   Tongue ROM (Oral Motor) protrusion is impaired;lateralization is impaired   Protrusion, Tongue ROM Impairment (Oral Motor) minimal impairment   Lateralization, Tongue ROM Impairment (Oral Motor) minimal impairment   Jaw Function (Oral Motor)   Jaw Function (Oral Motor) WNL   Cough/Swallow/Gag Reflex (Oral Motor)   Volitional Throat Clear/Cough (Oral Motor) unable/difficult to assess   Volitional Swallow (Oral Motor) mildly delayed   Vocal Quality/Secretion Management (Oral Motor)   Vocal Quality (Oral Motor) WNL;other (see comments)  (low volume)   Secretion Management (Oral Motor) WNL   General Swallowing Observations   Past History of Dysphagia CVA March 2022  per notes dysphagia was resolved by hospital discharge and recommended regular diet w/thin liquids. Patients says he's been coughing for several months.   Respiratory Support (General Swallowing Observations) none   Current Diet/Method of Nutritional Intake (General Swallowing Observations, NIS) mildly thick liquids (level 2);regular diet  (baseline is regular w/thin)   Swallowing Evaluation Clinical swallow evaluation   Clinical Swallow Evaluation   Feeding Assistance minimal assistance required   Clinical Swallow Evaluation Textures Trialed thin liquids;mildly thick liquids;soft & bite-sized;solid foods   Clinical Swallow Eval: Thin Liquid Texture Trial   Mode of Presentation, Thin Liquids straw   Volume of Liquid or Food Presented 4 oz   Oral Phase of Swallow WFL   Pharyngeal Phase of Swallow repeated swallows;wet vocal quality after swallow;other (see comments)  (mild throat clear x2, audible swallows)   Diagnostic Statement mild wet vocal quality, suspected impairment   Clinical Swallow Eval: Mildly Thick Liquids   Mode of Presentation straw   Volume Presented 2 oz   Oral Phase WFL   Pharyngeal Phase repeated swallows;wet vocal quality after swallow;other (see comments)  (audible swallows)   Diagnostic Statement mild wet vocal quality   Clinical Swallow Eval: Soft & Bite Sized   Mode of Presentation spoon   Volume Presented 2 oz   Oral Phase WFL   Pharyngeal Phase repeated swallows;other (see comments)  (audible swallows)   Diagnostic Statement no wet vocal quality or throat clearing   Clinical Swallow Evaluation: Solid Food Texture Trial   Mode of Presentation self-fed   Volume Presented robert crackers   Oral Phase residue in oral cavity   Oral Residue other (see comments)  (lateral left tongue)   Pharyngeal Phase other (see comments)  (audible swallows)   Diagnostic Statement minimal oral residual after sips of liquid, clears using tongue with cue   Esophageal Phase of Swallow   Esophageal comments Patient  had frequent wet belching after swallows of liquid and soft solid, some coughing noted after intake was completed and patient reclined in bed to approx 30 degrees   Swallowing Recommendations   Diet Consistency Recommendations thin liquids (level 0);regular diet  (until video swallow study)   Recommended Feeding/Eating Techniques (Swallow Eval) maintain upright sitting position for eating   Instrumental Assessment Recommendations VFSS (videofluoroscopic swallowing study)   Clinical Impression   Criteria for Skilled Therapeutic Interventions Met (SLP Eval) Yes, treatment indicated   Functional Limitations Related to Problem List (SLP) risk of aspiration   Risks & Benefits of therapy have been explained evaluation/treatment results reviewed;care plan/treatment goals reviewed;risks/benefits reviewed;current/potential barriers reviewed;participants voiced agreement with care plan;participants included;patient   Clinical Impression Comments Pt presents with mild oral dysphagia and concern for possible aspiration with thin and mildly thick liquids. He demonstrates audible swallows, repeated swallows with each sip and bite, mild throat clearing and mild wet vocal quality with liquids. He also has frequent belching after swallows and coughing noted after PO was completed and he reclined in bed, which could indicate esophageal laryngeal regurgitation or other esophageal issue. Recommend video swallow study to further assess aspiration risk during PO intake, continue current diet for now.   SLP Total Evaluation Time   Eval: oral/pharyngeal swallow function, clinical swallow Minutes (57939) 23   SLP Goals   SLP Goals Swallow   SLP: Safely tolerate diet without signs/symptoms of aspiration With use of swallow precautions   SLP Discharge Planning   SLP Plan VSS to establish diet and plan, patient likely unable to independently or consistently follow strategies on his own.   SLP Rationale for DC Rec TBD pending VSS results   SLP  Brief overview of current status  video swallow study to further assess aspiration risk   Total Session Time   Total Session Time (sum of timed and untimed services) 23

## 2023-10-10 NOTE — CONSULTS
Care Management Initial Consult    General Information  Assessment completed with: Patient,    Type of CM/SW Visit: Initial Assessment    Primary Care Provider verified and updated as needed: Yes   Readmission within the last 30 days:        Reason for Consult: discharge planning  Advance Care Planning: Advance Care Planning Reviewed: no concerns identified          Communication Assessment  Patient's communication style: spoken language (English or Bilingual)             Cognitive  Cognitive/Neuro/Behavioral: .WDL except, orientation  Level of Consciousness: intermittent confusion        Mood/Behavior: calm, cooperative     Speech: garbled    Living Environment:   People in home: sibling(s), other (see comments) (Cousin and wife)  patient, brother Telly, cousin Shahrzad and his wife Yocasta  Current living Arrangements: house      Able to return to prior arrangements: other (see comments) (TBD)       Family/Social Support:  Care provided by: self, other (see comments) (brother and cousin)  Provides care for: no one, unable/limited ability to care for self  Marital Status: Single  Sibling(s), Other (specify) (cousin)          Description of Support System: Supportive, Involved    Support Assessment: Patient communicates needs well met, Adequate family and caregiver support    Current Resources:   Patient receiving home care services: No     Community Resources: None  Equipment currently used at home: wheelchair, manual, shower chair  Supplies currently used at home: None    Employment/Financial:  Employment Status: disabled        Financial Concerns:             Does the patient's insurance plan have a 3 day qualifying hospital stay waiver?  No    Lifestyle & Psychosocial Needs:  Social Determinants of Health     Food Insecurity: Not on file   Depression: Not at risk (6/15/2023)    PHQ-2     PHQ-2 Score: 0   Housing Stability: Not on file   Tobacco Use: Low Risk  (10/9/2023)    Patient History     Smoking Tobacco  "Use: Never     Smokeless Tobacco Use: Never     Passive Exposure: Not on file   Financial Resource Strain: Not on file   Alcohol Use: Not on file   Transportation Needs: Not on file   Physical Activity: Not on file   Interpersonal Safety: Not on file   Stress: Not on file   Social Connections: Not on file       Functional Status:  Prior to admission patient needed assistance:   Dependent ADLs:: Wheelchair-independent, Bathing, Dressing, Transfers  Dependent IADLs:: Cleaning, Cooking, Laundry, Shopping, Meal Preparation, Transportation  Assesssment of Functional Status: Not at  functional baseline    Mental Health Status:          Chemical Dependency Status:                Values/Beliefs:  Spiritual, Cultural Beliefs, Taoist Practices, Values that affect care:                 Additional Information:  Met with patient, introduced self and care management role. Tripp lives with his brother Telly and his cousin Shahrzad as well as Shahrzad's spouse Yocasta. Tripp reports that Shahrzad assists him with ADLs including showering and dressing. Family assists with IADLs. Tripp states that he ambulates short distances without any assistive devices. Shahrzad would like to be Tripp's PCA but does not know how to start this process. Encouraged him to talk to his PCP regarding this.     PT/OT recommends TCU. Writer discussed this recommendation with Tripp. Tripp declined TCU and requested home care PT instead. MD updated. Tripp reports that he has been to a TCU in the past ([facility]) and states \"it felt like I was in prison\".     CM to follow hospital progression and assist with discharge planning as needed. Transportation TBD.    Shonda Rodriguez RN     "

## 2023-10-10 NOTE — PROGRESS NOTES
10/10/23 9530   Appointment Info   Signing Clinician's Name / Credentials (PT) Dafne Warner DPT   Living Environment   People in Home sibling(s);other relative(s)  (Brother and Cousin)   Current Living Arrangements house   Home Accessibility stairs to enter home   Number of Stairs, Main Entrance greater than 10 stairs  (15 stairs to enter)   Stair Railings, Main Entrance railing on left side (ascending)   Transportation Anticipated health plan transportation   Living Environment Comments Unsure of accuracy of hx.   Self-Care   Usual Activity Tolerance moderate   Current Activity Tolerance moderate   Equipment Currently Used at Home cane, straight;wheelchair, manual   Fall history within last six months yes   Number of times patient has fallen within last six months   (unknown- pt noted recent fall with L hip pain)   Activity/Exercise/Self-Care Comment Pt noted he walks 1/2 mile outside daily.   General Information   Onset of Illness/Injury or Date of Surgery 10/09/23   Referring Physician Dawyne Daily MD   Patient/Family Therapy Goals Statement (PT) none   Pertinent History of Current Problem (include personal factors and/or comorbidities that impact the POC) 3M presenting with subacute weakness, confusion; pmhx includes CVA (LUE weakness, dysphagia), schizoaffective-bipolar, GERD, alcohol dependence (in remission), anxiety; admitted for aspiration PNA.   Existing Precautions/Restrictions (S)  fall   Strength (Manual Muscle Testing)   Strength (Manual Muscle Testing) Deficits observed during functional mobility   Bed Mobility   Bed Mobility supine-sit;sit-supine   Supine-Sit Epworth (Bed Mobility) moderate assist (50% patient effort);2 person assist   Transfers   Transfers sit-stand transfer   Sit-Stand Transfer   Sit-Stand Epworth (Transfers) contact guard;2 person assist   Assistive Device (Sit-Stand Transfers)   (HHA)   Gait/Stairs (Locomotion)   Epworth Level (Gait) minimum assist  (75% patient effort);2 person assist   Assistive Device (Gait)   (hand hold assist)   Distance in Feet 5'  (bed > wc)   Pattern (Gait) step-to   Deviations/Abnormal Patterns (Gait) left sided deviations;gait speed decreased   Clinical Impression   Criteria for Skilled Therapeutic Intervention Yes, treatment indicated   PT Diagnosis (PT) Impaired functional mobility   Influenced by the following impairments Weakness, pain   Functional limitations due to impairments Impaired strength, transfers, gait   Clinical Presentation (PT Evaluation Complexity) Evolving/Changing   Clinical Presentation Rationale Presents as diagnosed   Clinical Decision Making (Complexity) moderate complexity   Planned Therapy Interventions (PT) balance training;bed mobility training;gait training;home exercise program;stair training;strengthening;transfer training   Anticipated Equipment Needs at Discharge (PT) wheelchair   Risk & Benefits of therapy have been explained patient   PT Total Evaluation Time   PT Ingrid, Moderate Complexity Minutes (34512) 10   Physical Therapy Goals   PT Frequency Daily   PT Predicted Duration/Target Date for Goal Attainment 10/17/23   PT Goals Bed Mobility;Transfers;Gait   PT: Bed Mobility Minimal assist;Supine to/from sit   PT: Transfers Supervision/stand-by assist;Sit to/from stand;Bed to/from chair;Assistive device   PT: Gait Minimal assist;100 feet   PT Discharge Planning   PT Plan progress bed mobility, wc transfers, amb as grady   PT Discharge Recommendation (DC Rec) Transitional Care Facility   PT Rationale for DC Rec Ax1-2 for mobility today. Per pt, 15 stairs to enter home.   PT Brief overview of current status Pt amb 125' with min Ax2, hand hold assist.   Total Session Time   Total Session Time (sum of timed and untimed services) 10       Dafne Warner, PT, DPT  10/10/2023

## 2023-10-10 NOTE — PLAN OF CARE
"  Problem: Plan of Care - These are the overarching goals to be used throughout the patient stay.    Goal: Optimal Comfort and Wellbeing  10/10/2023 0648 by Berna Perez RN  Outcome: Progressing  10/10/2023 0648 by Berna Perez RN  Outcome: Progressing     Problem: Fall Injury Risk  Goal: Absence of Fall and Fall-Related Injury  Outcome: Progressing     Problem: Gas Exchange Impaired  Goal: Optimal Gas Exchange  10/10/2023 0648 by Berna Perez RN  Outcome: Progressing  10/10/2023 0648 by Berna Perez RN  Outcome: Progressing   Goal Outcome Evaluation:       Pt alert but forgetful, hard to understand as pt talks really fast and soft. BP 99/63 (BP Location: Left arm)   Pulse 81   Temp 98.4  F (36.9  C) (Rectal)   Resp 24   Ht 1.676 m (5' 6\")   Wt 70 kg (154 lb 5.2 oz)   SpO2 95%   BMI 24.91 kg/m    soft BP, left sided weakness, and edematous.Contracture in left arm. Pt stated he was hungry and thirsty. Placed on mechanical diet with level 2 thicken. No cough while eating or drinking. IV fluids running at 100 ml/hr, iv abx administered.                  "

## 2023-10-10 NOTE — PROGRESS NOTES
"   10/10/23 0836   Appointment Info   Signing Clinician's Name / Credentials (OT) Natasha PARKER johnathon Wilkinsonhannah CARPENTER/L   Living Environment   People in Home sibling(s);other relative(s)   Current Living Arrangements house   Home Accessibility stairs to enter home   Number of Stairs, Main Entrance greater than 10 stairs   Stair Railings, Main Entrance railing on left side (ascending)   Living Environment Comments Pt is a limited historian.   Self-Care   Current Activity Tolerance moderate   Equipment Currently Used at Home wheelchair, manual;shower chair   Fall history within last six months yes   Activity/Exercise/Self-Care Comment Pt is independent with toileting and does some dressing.  Per pt, he is able to walk a block at home without AD.   General Information   Onset of Illness/Injury or Date of Surgery 10/09/23   Referring Physician [facility]   Patient/Family Therapy Goal Statement (OT) none stated   Additional Occupational Profile Info/Pertinent History of Current Problem Pt admitted with PNA, greater wekness and confusion, concern for CVA.  Pt has past history of CVA with L UE residual weakness.   Existing Precautions/Restrictions fall   Cognitive Status Examination   Cognitive Status Comments Pt is imited historian.  Pt gives odd answers at times.  When asked aout how far he walks, pt states \"I blacked out after taking a THC gumy.\"   Visual Perception   Visual Impairment/Limitations WFL   Sensory   Sensory Quick Adds sensation intact   Range of Motion Comprehensive   Comment, General Range of Motion L L UE due to previous CVA   Strength Comprehensive (MMT)   Comment, General Manual Muscle Testing (MMT) Assessment Impaired L UE   Coordination   Upper Extremity Coordination Left UE impaired   Bed Mobility   Comment (Bed Mobility) Mod A of 2   Transfers   Transfer Comments Min A of 2   Balance   Balance Comments Min A   Activities of Daily Living   BADL Assessment/Intervention   (Mod A LB dressing)   Clinical " Impression   Criteria for Skilled Therapeutic Interventions Met (OT) Yes, treatment indicated   OT Diagnosis Impaired ADL independence   OT Problem List-Impairments impacting ADL balance;cognition;coordination;range of motion (ROM);strength;mobility;pain   Assessment of Occupational Performance 5 or more Performance Deficits   Planned Therapy Interventions (OT) ADL retraining;balance training;bed mobility training;cognition;transfer training   Clinical Decision Making Complexity (OT) high complexity   Risk & Benefits of therapy have been explained evaluation/treatment results reviewed;participants included;patient   Clinical Impression Comments Pt seen bedside for OT eval and treatment.  Pt demonstrates decreased independence with ADLs and mobility as well as impaired cognition.  OT to continue to address.  Recommend TCU.   OT Total Evaluation Time   OT Eval, High Complexity Minutes (83777) 10   OT Goals   Therapy Frequency (OT) 5 times/wk   OT Predicted Duration/Target Date for Goal Attainment 10/17/23   OT Goals Transfers;Toilet Transfer/Toileting;Hygiene/Grooming   OT: Hygiene/Grooming supervision/stand-by assist   OT: Transfer Modified independent   OT: Toilet Transfer/Toileting Modified independent   OT Discharge Planning   OT Plan transfers, toileting, G/H   OT Discharge Recommendation (DC Rec) Transitional Care Facility   OT Rationale for DC Rec TCU recommended as pt is below baseline for ADLs and mobility   OT Brief overview of current status Mod A with bed mobility, Min A of 2 with ambulation

## 2023-10-10 NOTE — H&P
[facility]    History and Physical - Hospitalist Service       Date of Admission:  10/9/2023    Assessment & Plan      Tripp Sanders is a 53M presenting with subacute weakness, confusion; pmhx includes CVA (LUE weakness, dysphagia), schizoaffective-bipolar, GERD, alcohol dependence (in remission), anxiety; admitted for aspiration PNA.    #aspiration pneumonia  #generalised weakness  Likely secondary to chronic dysphagia post CVA. NOT septic.  -Unasyn 3g IV q6h  -SLP evaluation/dietary recommendations    #CVA  Possible subacute CVA, per concerns of family. Per chart review, no new deficits identified, AOx4, though rapidity of speech with hushed voice.  -MRI brain  -Atorvastatin 80mg PO every day  -ASA 81mg PO every day  -consider neurology consult, pending MRI results    #schizoaffective disorder  -depakote 500mg PO at bedtime  -seroquel 500mg PO at bedtime    #hyperglycemia  -BMP trend    #normocytic anaemia  -CBC trend         Diet: Mechanical/Dental Soft Diet    DVT Prophylaxis: Enoxaparin (Lovenox) SQ  Schwartz Catheter: Not present  Lines: None     Cardiac Monitoring: None  Code Status: Full Code      Clinically Significant Risk Factors Present on Admission                                  Disposition Plan      Expected Discharge Date: 10/11/2023                  Dwayne Daily MD  Hospitalist Service  [facility]  Securely message with [employer] (more info)  Text page via [employer] Paging/Directory     ______________________________________________________________________    Chief Complaint   Generalised weakness, confusion    History is obtained from the patient and patient's brother    History of Present Illness   Tripp Sanders is a 53M presenting with subacute weakness, confusion; pmhx includes CVA (LUE weakness, dysphagia), schizoaffective-bipolar, GERD, alcohol dependence (in remission), anxiety; admitted for aspiration PNA.    Presents from home with  brother for weakness, confusion, multiple ground-level falls out of wheelchair.  Has been more confused, weak for the last 3 to 4 days.  He has been more concerned of him because of the weakness and apparent confusion which they feel is similar to when he had a stroke.  Mr. carlson does have cough, weakness, but no fever/chills.  Has had difficulty with swallowing since time of his previous stroke (appears to have occurred around 10/2022).       Past Medical History    History reviewed. No pertinent past medical history.    Past Surgical History   History reviewed. No pertinent surgical history.    Prior to Admission Medications   Prior to Admission Medications   Prescriptions Last Dose Informant Patient Reported? Taking?   QUEtiapine (SEROQUEL) 200 MG tablet 10/8/2023 at pm  Yes Yes   Sig: [QUETIAPINE (SEROQUEL) 200 MG TABLET] Take 200 mg by mouth at bedtime. Take with 300 mg tablet for total of 500 mg at bedtime   QUEtiapine (SEROQUEL) 300 MG tablet 10/8/2023 at pm  Yes Yes   Sig: [QUETIAPINE (SEROQUEL) 300 MG TABLET] Take 300 mg by mouth at bedtime. Take with 200 mg for total of 500 mg nightly   atorvastatin (LIPITOR) 80 MG tablet 10/8/2023 at pm  No Yes   Sig: Take 1 tablet (80 mg) by mouth every evening   divalproex sodium extended-release (DEPAKOTE ER) 500 MG 24 hr tablet 10/8/2023 at pm  Yes Yes   Sig: Take 500 mg by mouth At Bedtime   propranolol ER (INDERAL LA) 60 MG 24 hr capsule 10/8/2023 at pm  No Yes   Sig: Take 1 capsule (60 mg) by mouth daily      Facility-Administered Medications Last Administration Doses Remaining   paliperidone (INVEGA SUSTENNA) injection JEANE 156 mg 8/24/2023 10:20 AM            Review of Systems    The 10 point Review of Systems is negative other than noted in the HPI or here.      Physical Exam   Vital Signs: Temp: 98.4  F (36.9  C) Temp src: Rectal BP: 100/70 Pulse: 77   Resp: 25 SpO2: 94 % O2 Device: Nasal cannula Oxygen Delivery: 3 LPM  Weight: 154 lbs 5.15  oz    Constitutional: awake, alert, cooperative, no apparent distress, and appears stated age  Respiratory: CTAB  Cardiovascular: RRR no m/g/r  GI: soft, nontender, nondistended  Musculoskeletal: RUE shoulder/elbow flexion/extension 5/5, LUE shoulder/elbow flexion/extension 2/5  Neurologic: AOx3, L sided hemiparesis sparing of the head/face    Medical Decision Making       50 MINUTES SPENT BY ME on the date of service doing chart review, history, exam, documentation & further activities per the note.  MANAGEMENT DISCUSSED with the following over the past 24 hours: patient, family   NOTE(S)/MEDICAL RECORDS REVIEWED over the past 24 hours: outpatient FM<   Tests ORDERED & REVIEWED in the past 24 hours:  - See lab/imaging results included in the data section of the note      Data     I have personally reviewed the following data over the past 24 hrs:    7.0  \   13.2 (L)   / 154     136 100 21.7 (H) /  133 (H)   3.8 23 0.84 \     ALT: 15 AST: 58 (H) AP: 72 TBILI: 1.2   ALB: 3.7 TOT PROTEIN: 7.5 LIPASE: N/A     Trop: 11 BNP: N/A     Procal: N/A CRP: N/A Lactic Acid: 1.8       INR:  1.11 PTT:  30   D-dimer:  N/A Fibrinogen:  N/A       Imaging results reviewed over the past 24 hrs:   Recent Results (from the past 24 hour(s))   POC US ABDOMEN LIMITED    Narrative    Rubén Kitchen MD     10/9/2023 10:09 PM  POC US ABDOMEN LIMITED    Date/Time: 10/9/2023 10:06 PM    Performed by: Rubén Kitchen MD  Authorized by: Rubén Kitchen MD    Comments:      E-FAST negative.  Technically difficult examination.  Images uploaded   POC US ABDOMEN LIMITED    Narrative    Rubén Kitchen MD     10/9/2023 10:09 PM  POC US ABDOMEN LIMITED    Date/Time: 10/9/2023 10:06 PM    Performed by: Rubén Kitchen MD  Authorized by: Rubén Kitchen MD    Procedure details:     Indications comment:  Hypotension  Comments:      E-FAST negative.  Images saved and uploaded.  Technically difficult   exam   XR Pelvis Port 1/2 Views    Narrative    EXAM:  XR PELVIS PORT 1/2 VIEWS  LOCATION: [facility]  DATE: 10/9/2023    INDICATION: fall, low blood pressure, pain  COMPARISON: None.      Impression    IMPRESSION: Limited evaluation due to osteopenia, positioning, and overlying bowel gas. No definite fracture is identified. There is normal joint alignment. Mild degenerative changes throughout the pelvis.   XR Chest Port 1 View    Narrative    EXAM: XR CHEST PORT 1 VIEW  LOCATION: [facility]  DATE: 10/9/2023    INDICATION: fall  COMPARISON: 12/23/2019      Impression    IMPRESSION: Negative chest.   Head CT w/o contrast    Narrative    EXAM: CT HEAD W/O CONTRAST, CT CERVICAL SPINE W/O CONTRAST  LOCATION: [facility]  DATE/TIME: 10/9/2023 8:34 PM CDT    INDICATION: Traumatic head and neck injury. Fall, hypotension.  COMPARISON: None available.  TECHNIQUE:   1. Routine CT head without IV contrast. Multiplanar reformats. Dose reduction techniques were used.  2. Routine CT cervical spine without IV contrast. Multiplanar reformats. Dose reduction techniques were used.    FINDINGS:   HEAD CT:   INTRACRANIAL CONTENTS: No intracranial hemorrhage, extraaxial collection, or mass effect.  No CT evidence of acute infarct. Moderate zone of encephalomalacia and marginal gliosis in the right basal ganglia and overlying periventricular white matter and   corona radiata, insula, and overlying frontal operculum. Ex vacuo dilatation of the adjacent right lateral ventricle. Mild presumed chronic small vessel ischemic changes. Mild to moderate generalized volume loss. No hydrocephalus.     VISUALIZED ORBITS/SINUSES/MASTOIDS: No intraorbital abnormality. Mild mucosal thickening scattered about the paranasal sinuses. No middle ear or mastoid effusion.    BONES/SOFT TISSUES: No acute calvarial fracture or scalp hematoma.      CERVICAL SPINE CT:   VERTEBRAE: Vertebral body heights are preserved. Exaggeration of the usual  cervical lordosis with additional minimal multilevel degenerative sagittal malalignment. No evidence for acute fracture or posttraumatic subluxation.     CANAL/FORAMINA: No high-grade spinal canal or neural foraminal stenosis. Moderate degenerative neuroforaminal stenosis on the right at C5-C6.    PARASPINAL: Paraspinous soft tissues are grossly unremarkable. Visualized lung fields are unremarkable.          Impression    IMPRESSION:  HEAD CT:  1.  No acute intracranial abnormality or acute chondral fracture.  2.  Moderate zone of encephalomalacia and marginal gliosis in the right cerebral hemisphere as described above, most consistent with chronic right MCA territory infarction.  3.  Additional background mild to moderate chronic age-related changes.    CERVICAL SPINE CT:  1.  No CT evidence for acute fracture or post traumatic subluxation.  2.  No high-grade spinal canal or neuroforaminal stenosis.    Cervical spine CT w/o contrast    Narrative    EXAM: CT HEAD W/O CONTRAST, CT CERVICAL SPINE W/O CONTRAST  LOCATION: [facility]  DATE/TIME: 10/9/2023 8:34 PM CDT    INDICATION: Traumatic head and neck injury. Fall, hypotension.  COMPARISON: None available.  TECHNIQUE:   1. Routine CT head without IV contrast. Multiplanar reformats. Dose reduction techniques were used.  2. Routine CT cervical spine without IV contrast. Multiplanar reformats. Dose reduction techniques were used.    FINDINGS:   HEAD CT:   INTRACRANIAL CONTENTS: No intracranial hemorrhage, extraaxial collection, or mass effect.  No CT evidence of acute infarct. Moderate zone of encephalomalacia and marginal gliosis in the right basal ganglia and overlying periventricular white matter and   corona radiata, insula, and overlying frontal operculum. Ex vacuo dilatation of the adjacent right lateral ventricle. Mild presumed chronic small vessel ischemic changes. Mild to moderate generalized volume loss. No hydrocephalus.      VISUALIZED ORBITS/SINUSES/MASTOIDS: No intraorbital abnormality. Mild mucosal thickening scattered about the paranasal sinuses. No middle ear or mastoid effusion.    BONES/SOFT TISSUES: No acute calvarial fracture or scalp hematoma.      CERVICAL SPINE CT:   VERTEBRAE: Vertebral body heights are preserved. Exaggeration of the usual cervical lordosis with additional minimal multilevel degenerative sagittal malalignment. No evidence for acute fracture or posttraumatic subluxation.     CANAL/FORAMINA: No high-grade spinal canal or neural foraminal stenosis. Moderate degenerative neuroforaminal stenosis on the right at C5-C6.    PARASPINAL: Paraspinous soft tissues are grossly unremarkable. Visualized lung fields are unremarkable.          Impression    IMPRESSION:  HEAD CT:  1.  No acute intracranial abnormality or acute chondral fracture.  2.  Moderate zone of encephalomalacia and marginal gliosis in the right cerebral hemisphere as described above, most consistent with chronic right MCA territory infarction.  3.  Additional background mild to moderate chronic age-related changes.    CERVICAL SPINE CT:  1.  No CT evidence for acute fracture or post traumatic subluxation.  2.  No high-grade spinal canal or neuroforaminal stenosis.    CT Thoracic Spine Reconstructed    Narrative    EXAM: CT THORACIC SPINE RECONSTRUCTED, CT LUMBAR SPINE RECONSTRUCTED  LOCATION: [facility]  DATE/TIME: 10/9/2023 8:37 PM CDT    INDICATION: Traumatic back injury. Fall. Back pain. Hypotension.  COMPARISON: None.  TECHNIQUE: Dedicated axial, sagittal, and coronal images of the thoracic and lumbar spine were generated utilizing CT chest/abdomen/pelvis source data, which are reported separately. Dose reduction techniques were used.     FINDINGS:  THORACIC SPINE:  VERTEBRAE: Vertebral body heights are maintained, with mild, chronic degenerative appearing, height loss noted along the apposing endplates at T11-T12. Mild  broad S-shaped curvature of the mid/lower thoracic and upper lumbar spine. Sagittal alignment is   normally maintained. No evidence for acute fracture or posttraumatic subluxation.     CANAL/FORAMINA: No high-grade spinal canal or neural foraminal stenosis.    PARASPINAL: Visualized ribs are intact. Please see dedicated chest CT report for pulmonary and thoracic soft tissue findings.      LUMBAR SPINE:  VERTEBRAE: There are 5 nonrib-bearing lumbar type vertebral bodies. L4 limbus vertebral body noted. Otherwise normal vertebral body heights. Normal sagittal alignment is maintained. No evidence for acute fracture or posttraumatic subluxation.     CANAL/FORAMINA: No canal or neural foraminal stenosis.    PARASPINAL: The visualized bony pelvis is intact. Mild bilateral sacroiliac DJD. Please see dedicated abdomen/pelvis CT report for abdominal and pelvic soft tissue findings.          Impression    IMPRESSION:  THORACIC SPINE:  1.  No acute fracture or traumatic subluxation of the thoracic spine by CT imaging.  2.  No high-grade spinal canal or neuroforaminal stenosis.  3.  Please see dedicated chest CT report for pulmonary and thoracic soft tissue findings.    LUMBAR SPINE:  1.  No acute fracture or traumatic subluxation of the lumbar spine by CT imaging.  2.  No high-grade spinal canal or neuroforaminal stenosis.  3.  Please see dedicated abdomen/pelvis CT report for abdominal and pelvic soft tissue findings.     CT Lumbar Spine Reconstructed    Narrative    EXAM: CT THORACIC SPINE RECONSTRUCTED, CT LUMBAR SPINE RECONSTRUCTED  LOCATION: [facility]  DATE/TIME: 10/9/2023 8:37 PM CDT    INDICATION: Traumatic back injury. Fall. Back pain. Hypotension.  COMPARISON: None.  TECHNIQUE: Dedicated axial, sagittal, and coronal images of the thoracic and lumbar spine were generated utilizing CT chest/abdomen/pelvis source data, which are reported separately. Dose reduction techniques were used.      FINDINGS:  THORACIC SPINE:  VERTEBRAE: Vertebral body heights are maintained, with mild, chronic degenerative appearing, height loss noted along the apposing endplates at T11-T12. Mild broad S-shaped curvature of the mid/lower thoracic and upper lumbar spine. Sagittal alignment is   normally maintained. No evidence for acute fracture or posttraumatic subluxation.     CANAL/FORAMINA: No high-grade spinal canal or neural foraminal stenosis.    PARASPINAL: Visualized ribs are intact. Please see dedicated chest CT report for pulmonary and thoracic soft tissue findings.      LUMBAR SPINE:  VERTEBRAE: There are 5 nonrib-bearing lumbar type vertebral bodies. L4 limbus vertebral body noted. Otherwise normal vertebral body heights. Normal sagittal alignment is maintained. No evidence for acute fracture or posttraumatic subluxation.     CANAL/FORAMINA: No canal or neural foraminal stenosis.    PARASPINAL: The visualized bony pelvis is intact. Mild bilateral sacroiliac DJD. Please see dedicated abdomen/pelvis CT report for abdominal and pelvic soft tissue findings.          Impression    IMPRESSION:  THORACIC SPINE:  1.  No acute fracture or traumatic subluxation of the thoracic spine by CT imaging.  2.  No high-grade spinal canal or neuroforaminal stenosis.  3.  Please see dedicated chest CT report for pulmonary and thoracic soft tissue findings.    LUMBAR SPINE:  1.  No acute fracture or traumatic subluxation of the lumbar spine by CT imaging.  2.  No high-grade spinal canal or neuroforaminal stenosis.  3.  Please see dedicated abdomen/pelvis CT report for abdominal and pelvic soft tissue findings.     CT Chest/Abdomen/Pelvis w Contrast    Narrative    EXAM: CT CHEST/ABDOMEN/PELVIS W CONTRAST  LOCATION: [facility]  DATE: 10/9/2023    INDICATION: Fall, hypotensive  COMPARISON: None.  TECHNIQUE: CT scan of the chest, abdomen, and pelvis was performed following injection of IV contrast. Multiplanar  reformats were obtained. Dose reduction techniques were used.   CONTRAST: 90 ml Isovue 370    FINDINGS:   LUNGS AND PLEURA: Peribronchial infiltrates in both lower lobes and to a lesser extent in the upper lobes. Debris within the lower lobe bronchi. No pleural effusion or pneumothorax.    MEDIASTINUM/AXILLAE: Multiple mildly enlarged lymph nodes are most likely reactive.    CORONARY ARTERY CALCIFICATION: None.    HEPATOBILIARY: Normal.    PANCREAS: Normal.    SPLEEN: Normal.    ADRENAL GLANDS: Normal.    KIDNEYS/BLADDER: Benign renal cysts.    BOWEL: Normal.    LYMPH NODES: Normal.    VASCULATURE: Unremarkable.    PELVIC ORGANS: Normal. No hemoperitoneum.    MUSCULOSKELETAL: Normal.      Impression    IMPRESSION:  1.  Peribronchial infiltrates in both lungs consistent with pneumonia or aspiration pneumonitis.  2.  No evidence of an acute traumatic injury in the chest, abdomen, or pelvis.

## 2023-10-10 NOTE — MEDICATION SCRIBE - ADMISSION MEDICATION HISTORY
Medication Scribe Admission Medication History    Admission medication history is complete. The information provided in this note is only as accurate as the sources available at the time of the update.    Information Source(s): Patient and Family member via in-person    Pertinent Information: Patient sets up own medications. Brother, Abimael, was in room and verified that patient takes only 4 medications in the evening. Patient reports no longer taking Aspirin or Omeprazole    Changes made to PTA medication list:  Added: None  Deleted: Asprin, Prilosec  Changed: None    Medication Affordability:  Not including over the counter (OTC) medications, was there a time in the past 3 months when you did not take your medications as prescribed because of cost?: No    Allergies reviewed with patient and updates made in EHR: yes    Medication History Completed By: Eduardo Wetzel 10/9/2023 9:18 PM    Current Facility-Administered Medications for the 10/9/23 encounter (Hospital Encounter)   Medication Note    paliperidone (INVEGA SUSTENNA) injection JEANE 156 mg 10/9/2023: Dose administered 8/24/23     PTA Med List   Medication Sig Last Dose    atorvastatin (LIPITOR) 80 MG tablet Take 1 tablet (80 mg) by mouth every evening 10/8/2023 at pm    divalproex sodium extended-release (DEPAKOTE ER) 500 MG 24 hr tablet Take 500 mg by mouth At Bedtime 10/8/2023 at pm    propranolol ER (INDERAL LA) 60 MG 24 hr capsule Take 1 capsule (60 mg) by mouth daily 10/8/2023 at pm    QUEtiapine (SEROQUEL) 200 MG tablet [QUETIAPINE (SEROQUEL) 200 MG TABLET] Take 200 mg by mouth at bedtime. Take with 300 mg tablet for total of 500 mg at bedtime 10/8/2023 at pm    QUEtiapine (SEROQUEL) 300 MG tablet [QUETIAPINE (SEROQUEL) 300 MG TABLET] Take 300 mg by mouth at bedtime. Take with 200 mg for total of 500 mg nightly 10/8/2023 at pm

## 2023-10-10 NOTE — CONSULTS
[facility]  Neurology Consultation    Patient Name:  Tripp Sanders  MRN:  [id number]    :  1970  Date of Service:  October 10, 2023  Primary care provider:  Telly Arias      Neurology consultation service was asked to see Tripp Sanders by Dr. Kitchen to evaluate encephalopathy.    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 Patient states the falls were witnessed by his brother. He states he fell twice from standing after eating 8 THC cookies; denies other substance use. Reports associated loss of consciousness. He hit the left side of his head during the fall. Patient currently at baseline mentation according to his brother by telephone, and another family member (either his sister or sister-in-law). Patient reports compliance with medications. He takes quetiapine 200mg in morning and 300mg at night. Reports baseline visual and auditory hallucinations. Patient found to have possible aspiration pneumonia and is being treated with ceftriaxone and azithromycin. Denies being wheel-chair bound. Denies use of blood thinners, history of seizures, new numbness/tingling, new focal weakness.      ROS  A comprehensive ROS was performed and pertinent findings were included in HPI.     PMH  History reviewed. No pertinent past medical history.  History reviewed. No pertinent surgical history.    Medications   I have personally reviewed the patient's medication list.     Allergies  I have personally reviewed the patient's allergy list.     Social History  THC use . Patient lives at home with cousin and brother. States they help him with activities of daily living.    Physical Examination   Vitals: BP 99/63 (BP  "Location: Left arm)   Pulse 81   Temp 98.4  F (36.9  C) (Rectal)   Resp 24   Ht 1.676 m (5' 6\")   Wt 70 kg (154 lb 5.2 oz)   SpO2 95%   BMI 24.91 kg/m    General: Lying in bed, NAD  Head: NC. Scab over left supraorbital ridge  Eyes: no icterus, op pink and moist, patient unable to keep eyes wide open without tearing  Cardiac: Extremities warm, no edema.   Respiratory: non-labored on RA, productive cough throughout the encounter  Skin: No rash or lesion on exposed skin  Neuro:  Mental status: Awake, alert, attentive, oriented to self, time, place, and circumstance. Language is fluent and coherent with intact comprehension of complex commands, naming and repetition.  Cranial nerves: PERRL, conjugate gaze, EOMI, left facial weakness, shoulder shrug strong, tongue deviated to the left, no dysarthria.   Motor: Normal bulk and tone on right. Appears to have contracture of left hand and wrist in flexion, but is able to extend fingers and wrist. No abnormal movements. 5/5 strength of right biceps, triceps, plantarflexion, dorsiflexion. Limited range of motion of left shoulder. 4/5 strength of left biceps, triceps,   Reflexes: Normo-reflexic and symmetric biceps, brachioradialis, triceps, patellae, and achilles. No clonus, toes down-going.  Sensory:Gross sensation intact to light touch of upper and lower extremities.     Investigations   I have personally reviewed pertinent labs, tests, and radiological imaging. Discussion of notable findings is included under Impression.     Was patient transferred from outside hospital?   No    Impression  #Encephalopathy, resolved  #THC intoxication, resolved  #Old right MCA stroke  #Left hemibody hemiplegia secondary to above  Mr. Sadners is a 53-year-old man with a history of a right MCA stroke in 2022 with ongoing left-sided hemiplegia, schizoaffective disorder, substance use disorder who presented after a fall.  Neurology was consulted for encephalopathy, though once he was " evaluated at bedside earlier today his encephalopathy had improved.  The patient himself attributed this to the 8 THC cookies that he had eaten earlier, and he described that a typical dose is half a cookie.  On exam he is alert, conversant, fully oriented to time, place, and situation.  I gathered collateral information from his family members via telephone who are also able to talk to him in real-time, and they both explained that he is at his baseline.    His exam does show left-sided facial weakness, and full left hemibody weakness secondary to his known old infarct.  While we would expect relatively brisk reflexes in the left hemibody, these were not appreciated on exam.    Patient at reported baseline mentation. Neurologic deficits consistent with prior stroke. No evidence of new focal weakness, speech changes, or changes on imaging. Patient states compliance with medications and hallucinations at baseline, especially if he is delayed in taking his antipsychotic medications. He is undergoing antibiotic treatment for possible aspiration pneumonia which may have played a role in the initial encephalopathy.     Recommendations  -No further work-up indicated from a neurologic perspective  -Patient aware that use/misuse of THC can contribute to lightheadedness, encephalopathy, and places him at higher risk of falling  - No current indications for MRI of the brain given that he has returned to his baseline  - Inpatient neurology will sign off at this time, please page if any new questions arise.    Thank you for involving Neurology in the care of Tripp Sanders.  Please do not hesitate to call with questions/concerns (consult pager [phone]).      Namita OREILLY  Patient was seen under the supervision of Dr. Crenshaw.    Felipe Crenshaw MD        Physician Attestation   I, Felipe Crenshaw MD, was present with the medical/RODRIGO student who participated in the service and in the documentation of the note.   I have verified the history and personally performed the physical exam and medical decision making.  I agree with the assessment and plan of care as documented in the note.      Key findings:   I have reviewed the document above, and have edited the physical examination, neurologic examination, and have written the impression/plan to create one cohesive document.  I have reviewed the imaging and labs as described above, and carried out conversation with the patient and his family members by telephone.  I spent 65 minutes on this encounter.    Please see A&P for additional details of medical decision making.    I have personally reviewed the following data over the past 24 hrs:    5.1  \   11.7 (L)   / 145 (L)     140 108 (H) 17.3 /  91   3.3 (L) 24 0.69 \     ALT: 15 AST: 58 (H) AP: 72 TBILI: 1.2   ALB: 3.7 TOT PROTEIN: 7.5 LIPASE: N/A     Trop: 11 BNP: N/A     Procal: N/A CRP: N/A Lactic Acid: 1.8       INR:  1.11 PTT:  30   D-dimer:  N/A Fibrinogen:  N/A         Felipe Crenshaw MD  Date of Service (when I saw the patient): 10/10/23

## 2023-10-10 NOTE — PLAN OF CARE
Assumed care of patient.  MRI checklist completed and faxed to MRI.     Addendum:  MRI order discontinued by Dr. Crenshaw

## 2023-10-10 NOTE — PROGRESS NOTES
"[facility]    Medicine Progress Note - Hospitalist Service    Date of Admission:  10/9/2023    Assessment & Plan      Tripp Sanders is a 53M presenting with subacute weakness, confusion; pmhx includes CVA (LUE weakness, dysphagia), schizoaffective-bipolar, GERD, alcohol dependence (in remission), anxiety; admitted for aspiration PNA.    #aspiration pneumonia  #generalised weakness  --- May have aspirated during time he was \"passed out\" after ingesting too much marijuana edibles   --- Admit CT shows peribronchial infiltrates bilateral lungs consistent with pneumonia or aspiration pneumonitis; mediastinal lymph node enlargement also seen  --- Some degree of chronic dysphagia post CVA.   ---NOT septic.  -Unasyn 3g IV q6h started on admission.  transition to oral Augmentin  -SLP evaluation reviewed.  Okay with a diet right now but recommending video swallow  --- PT and OT recommending TCU    #Acute encephalopathy  --- Resolved.  Suspect related to unintentional marijuana overuse/ingestion  --- On admit family concerned about possible subacute CVA, per concerns of family.   ---no new deficits identified, AOx4, though rapidity of speech with hushed voice.  ----Head CT negative  ---MRI ordered and pending  ---Neurology consulted  -Atorvastatin 80mg PO every day  -ASA 81mg PO every day    #hypokalemia - replace per protocol    #schizoaffective disorder  -depakote 500mg PO at bedtime  -seroquel 500mg PO at bedtime  -Was independently with family    #hyperglycemia  -No history of diabetes  -Appears stable    #normocytic anaemia  -Hemoglobin stable    #Fall   --- Suspect related to marijuana ingestion   --- CT head, C-spine, thoracic and L-spine all negative  --- PT and OT recommending TCU         Diet: Combination Diet Regular Diet; Mildly Thick (level 2)    DVT Prophylaxis: Enoxaparin (Lovenox) SQ  Schwartz Catheter: Not present  Lines: None     Cardiac Monitoring: None  Code Status: Full Code  " "    Clinically Significant Risk Factors Present on Admission        # Hypokalemia: Lowest K = 3.3 mmol/L in last 2 days, will replace as needed                           Disposition Plan      Expected Discharge Date: 10/11/2023                  Glendy Long MD  Hospitalist Service  [facility]  Securely message with [employer] (more info)  Text page via [employer] Paging/Directory   ______________________________________________________________________    Interval History   --- Feels back to normal.    ---Patient seen- speech mildly pressure  ---asking about going home, says he was seen by speech    ---able to tell me that he was seen by PT and OT and feels ok to go home    --tells me a friend brought THC edibles and he ate 8 cookies instead of the 8/19/23 he was supposed to eat then \"blacked out\" and has no memory of night and how he got here and did vomit    Physical Exam   Vital Signs: Temp: 97.8  F (36.6  C) Temp src: Axillary BP: 107/71 Pulse: 81   Resp: 20 SpO2: 95 % O2 Device: None (Room air) Oxygen Delivery: 3 LPM  Weight: 154 lbs 5.15 oz    General Appearance: Somewhat frail in appearance.  Somewhat pressured speech.  Respiratory: Fairly clear to auscultation  Cardiovascular: Regular rate and rhythm  GI: Soft and nontender without hepatosplenomegaly  Skin: Edema in left leg.  Other: Weakness on left side arm and leg.    Medical Decision Making             Data     "

## 2023-10-11 ENCOUNTER — APPOINTMENT (OUTPATIENT)
Dept: SPEECH THERAPY | Facility: HOSPITAL | Age: 53
DRG: 917 | End: 2023-10-11
Payer: COMMERCIAL

## 2023-10-11 ENCOUNTER — APPOINTMENT (OUTPATIENT)
Dept: RADIOLOGY | Facility: HOSPITAL | Age: 53
DRG: 917 | End: 2023-10-11
Attending: STUDENT IN AN ORGANIZED HEALTH CARE EDUCATION/TRAINING PROGRAM
Payer: COMMERCIAL

## 2023-10-11 ENCOUNTER — APPOINTMENT (OUTPATIENT)
Dept: PHYSICAL THERAPY | Facility: HOSPITAL | Age: 53
DRG: 917 | End: 2023-10-11
Payer: COMMERCIAL

## 2023-10-11 ENCOUNTER — APPOINTMENT (OUTPATIENT)
Dept: OCCUPATIONAL THERAPY | Facility: HOSPITAL | Age: 53
DRG: 917 | End: 2023-10-11
Payer: COMMERCIAL

## 2023-10-11 LAB — POTASSIUM SERPL-SCNC: 3.5 MMOL/L (ref 3.4–5.3)

## 2023-10-11 PROCEDURE — 36415 COLL VENOUS BLD VENIPUNCTURE: CPT | Performed by: STUDENT IN AN ORGANIZED HEALTH CARE EDUCATION/TRAINING PROGRAM

## 2023-10-11 PROCEDURE — 92611 MOTION FLUOROSCOPY/SWALLOW: CPT | Mod: GN | Performed by: SPEECH-LANGUAGE PATHOLOGIST

## 2023-10-11 PROCEDURE — 97535 SELF CARE MNGMENT TRAINING: CPT | Mod: GO

## 2023-10-11 PROCEDURE — 92526 ORAL FUNCTION THERAPY: CPT | Mod: GN | Performed by: SPEECH-LANGUAGE PATHOLOGIST

## 2023-10-11 PROCEDURE — 250N000013 HC RX MED GY IP 250 OP 250 PS 637: Performed by: INTERNAL MEDICINE

## 2023-10-11 PROCEDURE — 120N000001 HC R&B MED SURG/OB

## 2023-10-11 PROCEDURE — 74230 X-RAY XM SWLNG FUNCJ C+: CPT

## 2023-10-11 PROCEDURE — 84132 ASSAY OF SERUM POTASSIUM: CPT | Performed by: STUDENT IN AN ORGANIZED HEALTH CARE EDUCATION/TRAINING PROGRAM

## 2023-10-11 PROCEDURE — 97530 THERAPEUTIC ACTIVITIES: CPT | Mod: GP

## 2023-10-11 PROCEDURE — 99232 SBSQ HOSP IP/OBS MODERATE 35: CPT | Performed by: FAMILY MEDICINE

## 2023-10-11 PROCEDURE — 250N000013 HC RX MED GY IP 250 OP 250 PS 637: Performed by: FAMILY MEDICINE

## 2023-10-11 PROCEDURE — 250N000013 HC RX MED GY IP 250 OP 250 PS 637: Performed by: STUDENT IN AN ORGANIZED HEALTH CARE EDUCATION/TRAINING PROGRAM

## 2023-10-11 PROCEDURE — 250N000011 HC RX IP 250 OP 636: Mod: JZ | Performed by: STUDENT IN AN ORGANIZED HEALTH CARE EDUCATION/TRAINING PROGRAM

## 2023-10-11 RX ORDER — DIVALPROEX SODIUM 125 MG/1
250 CAPSULE, COATED PELLETS ORAL EVERY 12 HOURS SCHEDULED
Status: DISCONTINUED | OUTPATIENT
Start: 2023-10-11 | End: 2023-10-12 | Stop reason: HOSPADM

## 2023-10-11 RX ORDER — BARIUM SULFATE 400 MG/ML
SUSPENSION ORAL ONCE
Status: COMPLETED | OUTPATIENT
Start: 2023-10-11 | End: 2023-10-11

## 2023-10-11 RX ADMIN — AMOXICILLIN AND CLAVULANATE POTASSIUM 1 TABLET: 875; 125 TABLET, FILM COATED ORAL at 19:54

## 2023-10-11 RX ADMIN — ATORVASTATIN CALCIUM 80 MG: 40 TABLET, FILM COATED ORAL at 19:54

## 2023-10-11 RX ADMIN — AMOXICILLIN AND CLAVULANATE POTASSIUM 1 TABLET: 875; 125 TABLET, FILM COATED ORAL at 08:39

## 2023-10-11 RX ADMIN — BARIUM SULFATE: 400 SUSPENSION ORAL at 09:30

## 2023-10-11 RX ADMIN — DIVALPROEX SODIUM 250 MG: 125 CAPSULE, COATED PELLETS ORAL at 21:34

## 2023-10-11 RX ADMIN — Medication 81 MG: at 08:39

## 2023-10-11 RX ADMIN — QUETIAPINE FUMARATE 500 MG: 300 TABLET ORAL at 20:14

## 2023-10-11 RX ADMIN — ENOXAPARIN SODIUM 40 MG: 40 INJECTION SUBCUTANEOUS at 08:39

## 2023-10-11 ASSESSMENT — ACTIVITIES OF DAILY LIVING (ADL)
ADLS_ACUITY_SCORE: 32

## 2023-10-11 NOTE — PLAN OF CARE
"  Problem: Plan of Care - These are the overarching goals to be used throughout the patient stay.    Goal: Patient-Specific Goal (Individualized)  Description: You can add care plan individualizations to a care plan. Examples of Individualization might be:  \"Parent requests to be called daily at 9am for status\", \"I have a hard time hearing out of my right ear\", or \"Do not touch me to wake me up as it startles me\".  Outcome: Progressing  Flowsheets (Taken 10/10/2023 2137)  Anxieties, Fears or Concerns: na  Goal: Absence of Hospital-Acquired Illness or Injury  Outcome: Progressing  Intervention: Identify and Manage Fall Risk  Recent Flowsheet Documentation  Taken 10/11/2023 0100 by Tyler Laureano RN  Safety Promotion/Fall Prevention:   activity supervised   clutter free environment maintained   lighting adjusted   nonskid shoes/slippers when out of bed   patient and family education   safety round/check completed  Taken 10/10/2023 2140 by Tyler Laureano RN  Safety Promotion/Fall Prevention:   activity supervised   clutter free environment maintained   lighting adjusted   nonskid shoes/slippers when out of bed   patient and family education   safety round/check completed  Goal: Optimal Comfort and Wellbeing  Outcome: Progressing  Intervention: Monitor Pain and Promote Comfort  Recent Flowsheet Documentation  Taken 10/11/2023 0400 by Tyler Laureano RN  Pain Management Interventions: emotional support  Taken 10/11/2023 0000 by Tyler Laureano RN  Pain Management Interventions: emotional support  Taken 10/10/2023 2136 by Tyler Laureano RN  Pain Management Interventions: emotional support     Problem: Gas Exchange Impaired  Goal: Optimal Gas Exchange  Outcome: Progressing  Intervention: Optimize Oxygenation and Ventilation  Recent Flowsheet Documentation  Taken 10/11/2023 0400 by Tyler Laureano RN  Head of Bed (HOB) Positioning: HOB flat  Taken 10/11/2023 0100 by Tyler Laureano RN  Head of Bed (HOB) Positioning: HOB at 20-30 " degrees  Taken 10/11/2023 0000 by Tyler Laureano RN  Head of Bed (HOB) Positioning: HOB flat  Taken 10/10/2023 2140 by Tyler Laureano RN  Head of Bed (HOB) Positioning: HOB at 20-30 degrees  Taken 10/10/2023 2136 by Tyler Laureano RN  Head of Bed (HOB) Positioning: HOB flat     Problem: Fall Injury Risk  Goal: Absence of Fall and Fall-Related Injury  Outcome: Progressing  Intervention: Identify and Manage Contributors  Recent Flowsheet Documentation  Taken 10/11/2023 0100 by Tyler Laureano RN  Medication Review/Management: medications reviewed  Taken 10/10/2023 2140 by Tyler Laureano RN  Medication Review/Management: medications reviewed  Intervention: Promote Injury-Free Environment  Recent Flowsheet Documentation  Taken 10/11/2023 0100 by Tyler Laureano RN  Safety Promotion/Fall Prevention:   activity supervised   clutter free environment maintained   lighting adjusted   nonskid shoes/slippers when out of bed   patient and family education   safety round/check completed  Taken 10/10/2023 2140 by Tyler Laureano RN  Safety Promotion/Fall Prevention:   activity supervised   clutter free environment maintained   lighting adjusted   nonskid shoes/slippers when out of bed   patient and family education   safety round/check completed     Pt denies pain. Primofit in patent and flowing properly. Slept throughout the shift.

## 2023-10-11 NOTE — SIGNIFICANT EVENT
Patient took medications crushed in two spoonfuls of ice cream swallowed well after started choking   And coughing needed to be suctioned lasted for a couple minutes oxygen applied transferred to xray for video   Swallow xray, patient instructed not to eat or drink at this time.

## 2023-10-11 NOTE — PROGRESS NOTES
"[facility]    Medicine Progress Note - Hospitalist Service    Date of Admission:  10/9/2023    Assessment & Plan      Tripp Sanders is a 53M presenting with subacute weakness, confusion; pmhx includes CVA (LUE weakness, dysphagia), schizoaffective-bipolar, GERD, alcohol dependence (in remission), anxiety; admitted for aspiration PNA.    #aspiration pneumonia  #generalised weakness  #Presumed chronic dysphagia  --- May have aspirated during time he was \"passed out\" after ingesting too much marijuana edibles, although speech video swallow indicates fairly significant oral and pharyngeal dysphagia with high risk of aspiration.  --- Admit CT shows peribronchial infiltrates bilateral lungs consistent with pneumonia or aspiration pneumonitis; mediastinal lymph node enlargement also seen  --- Known chronic dysphagia post CVA.   ---NOT septic.  -Unasyn 3g IV q6h started on admission.  transition to oral Augmentin  --- Discussed extensively with speech and then also discussed extensively with patient concerns for no safe swallowing textures at this time.  I briefly discussed feeding tube and patient stated he would never like this.  He appeared agreeable to outpatient speech therapy which may or may not be helpful.  --- PT and OT recommending TCU    #Acute encephalopathy  --- Resolved.  Suspect related to unintentional marijuana overuse/ingestion  --- On admit family concerned about possible subacute CVA, per concerns of family.   ---no new deficits identified,  ----Head CT negative  ---MRI initially ordered however after neurology consulted and patient back to baseline they canceled MRI  ---Neurology consulted, signed off 10/10 has no new neurologic deficits found  -Atorvastatin 80mg PO every day  -ASA 81mg PO every day    #hypokalemia - replace per protocol    #schizoaffective disorder  -depakote 500mg PO at bedtime  -seroquel 500mg PO at bedtime  -Lives independently with " "family    #hyperglycemia  -No history of diabetes  -Appears stable    #normocytic anaemia  -Hemoglobin stable    #Fall   --- Suspect related to marijuana ingestion   --- CT head, C-spine, thoracic and L-spine all negative  --- PT and OT recommending TCU    THC use/misuse  ---states at 8 THC cookies istead of 0.5 as recommended and \"blacked out\"  ---smokes THC daily    Today I attempted to get a hold of family, both his cousin Romel as well as brother Telly.  Was unable to leave a message at either number.  Will attempt to call back tomorrow.       Diet: Combination Diet Regular Diet; Thin Liquids (level 0)    DVT Prophylaxis: Enoxaparin (Lovenox) SQ  Schwartz Catheter: Not present  Lines: None     Cardiac Monitoring: None  Code Status: Full Code      Clinically Significant Risk Factors        # Hypokalemia: Lowest K = 3.3 mmol/L in last 2 days, will replace as needed                             Disposition Plan      Expected Discharge Date: 10/12/2023      Destination: home with family;home with help/services;inpatient rehabilitation facility  Discharge Comments: declining TCU          Glendy Long MD  Hospitalist Service  [facility]  Securely message with [email or web address] (more info)  Text page via [employer] Paging/Directory   ______________________________________________________________________    Interval History   --- Patient seen and chart reviewed    ---Extensive discussion with SLP today re; significant oropharyngeal dysphagia.  --- Patient is not clear that he has had these problems the whole time although is able to tell me that he had issues with coughing for about 2 months in 2022 that he was sure were related to \"buying bad marijuana.\"  He then tells me he smokes marijuana daily and is more worried that anything with swallowing is related to marijuana.  --- I had extensive discussion with him about if he makes his own medical decisions and he wants me to call and discuss things with more Quon.  " "He tells me Romel helps him with decisions although \"is not my payee.\"  When I ask if I should call any of his siblings he says no.  His dad is passed away and his mom is in a nursing home.  --- Wants to leave the hospital.  Utterly uninterested in TCU.  Coughing noted requiring suction today..  He is still diffusely weak.    Physical Exam   Vital Signs: Temp: 97.6  F (36.4  C) Temp src: Oral BP: 116/67 Pulse: 104   Resp: 18 SpO2: 94 % O2 Device: Nasal cannula Oxygen Delivery: 1 LPM  Weight: 148 lbs 2.39 oz    General Appearance: Somewhat frail in appearance.  Somewhat pressured speech.  Coughing more today.  Respiratory: Fairly clear to auscultation; no wheezes or definitive crackles that I can hear.  Cardiovascular: Regular rate and rhythm  GI: Soft and nontender without hepatosplenomegaly  Skin: Unchanged mild edema in left leg.  Other: Weakness on left side arm and leg.    Medical Decision Making             Data     "

## 2023-10-11 NOTE — PROGRESS NOTES
"Care Management Follow Up    Length of Stay (days): 2    Expected Discharge Date: 10/12/2023     Concerns to be Addressed: discharge planning     Patient plan of care discussed at interdisciplinary rounds: Yes    Anticipated Discharge Disposition:  Recommendation is TCU, patient declining.  Anticipate home with home care     Anticipated Discharge Services:  home care PT, OT, SLP  Anticipated Discharge DME:  per therapy    Patient/family educated on Medicare website which has current facility and service quality ratings:  NA  Education Provided on the Discharge Plan:  yes, per care team  Patient/Family in Agreement with the Plan:  yes, per care team    Referrals Placed by CM/SW:  home care  Private pay costs discussed: Not applicable    Additional Information:  Therapy recommendation is TCU, patient declining.  Patient walking >100 feet though has 15 stairs to enter home.  Patient interested in home care.  CM placed order for home care PT, OT, SLP.    CM attempted to get in contact with family to ask if patient has a legal guardian.  LEONARDA called brother, no answer, left voicemail requesting call back.    Cousin Pat called.  Pat feels patient would benefit from home care, he had promised patient he would not put him in a TCU.  Pat reports they are working with [facility] to try to get set up with services so Pat can be patient's PCA.  Pat reports that at this time patient is his own legal decision maker and of sound mind per his psychiatrist.  The worry is about poor decision making related to chemical dependency.      Patient has been accepted to [facility] home care for PT, OT, SLP.    Social Hx: \"Tripp lives with his brother Telly and his cousin Shahrzad as well as Shahrzad's spouse Yocasta. Tripp reports that Shahrzad (cousin) assists him with ADLs including showering and dressing. Family assists with IADLs. Tripp states that he ambulates short distances without any assistive devices. Shahrzad would " "like to be Tripp's PCA but does not know how to start this process. Encouraged him to talk to his PCP regarding this.\"    CM will continue to monitor medical progression and aid in discharge planning.     Della Devine RN      "

## 2023-10-11 NOTE — PLAN OF CARE
Problem: Fall Injury Risk  Goal: Absence of Fall and Fall-Related Injury  Outcome: Progressing  Intervention: Identify and Manage Contributors  Recent Flowsheet Documentation  Taken 10/11/2023 1300 by Cindy Nascimento RN  Medication Review/Management: medications reviewed  Taken 10/11/2023 0830 by Cindy Nascimento RN  Medication Review/Management: medications reviewed  Intervention: Promote Injury-Free Environment  Recent Flowsheet Documentation  Taken 10/11/2023 1300 by Cindy Nascimento RN  Safety Promotion/Fall Prevention:   activity supervised   clutter free environment maintained   lighting adjusted   nonskid shoes/slippers when out of bed   patient and family education   safety round/check completed  Taken 10/11/2023 0830 by Cindy Nascimento RN  Safety Promotion/Fall Prevention:   activity supervised   clutter free environment maintained   lighting adjusted   nonskid shoes/slippers when out of bed   patient and family education   safety round/check completed   Goal Outcome Evaluation:      History of old CVA with left side weakness denies pain left arm contracted, alert, coughs with any movement up with transfer belt  And assist of two. Had coughing spell after medications crushed in ice cream this am, needed to be suctioned after, writer spoke with Dr Long about concern with patient eating? Speech therapy to see.  Try diet as ordered, Dr to touch base with family about discharge.  Lungs diminished upper airway congestion continues. Talks about how he smokes weed from morning until night.

## 2023-10-11 NOTE — PROGRESS NOTES
VIDEO SWALLOW STUDY     10/11/23 0931   Appointment Info   Signing Clinician's Name / Credentials (SLP) Jade Alvarez [facility]   General Information   Onset of Illness/Injury or Date of Surgery 10/10/23   Referring Physician Dwayne Jaimes MD   Pertinent History of Current Problem 53M presenting with subacute weakness, confusion; pmhx includes CVA (LUE weakness, dysphagia), schizoaffective-bipolar, GERD, alcohol dependence (in remission), anxiety; admitted for aspiration PNA.   General Observations Patient alert, interactive, coughing and spitting into emesis bag upon SLP arrival prior to exam.   Additional Occupational Profile Info/Pertinent History of Current Problem RN reported patient coughing after taking pills crushed with 2 spoons of ice cream this AM and upon returning from VFSS today. Patient required oral suction to clear pills and barium after swallow study.   Type of Evaluation   Type of Evaluation Swallow Evaluation   General Swallowing Observations   Swallowing Evaluation Videofluoroscopic swallow study (VFSS)   VFSS Evaluation   Radiologist Dr. Patterson   Views Taken left lateral   Physical Location of Procedure [facility]   VFSS Textures Trialed thin liquids;slightly thick liquids;mildly thick liquids;pureed;solid foods   VFSS Eval: Thin Liquid Texture Trial   Mode of Presentation, Thin Liquid spoon;cup;straw;fed by clinician;self-fed   Order of Presentation 1 (spoon), 2, 7, 12 (cup), 5, 6 (straw)   Preparatory Phase poor bolus control  (piecemeal swallows)   Oral Phase, Thin Liquid impaired AP movement;residue in oral cavity;premature pharyngeal entry   Bolus Location When Swallow Triggered pyriforms   Pharyngeal Phase, Thin Liquid impaired hyolaryngeal excursion;impaired epiglottic movement;impaired tongue base retraction;residue in vallecula;residue in pyriform sinus;impaired pharyngoesophageal segment opening   Rosenbek's Penetration Aspiration Scale: Thin Liquid  Trial Results 5 - contrast contacts vocal cords, visible residue remains (penetration)  (possible aspiration; difficult to view due to patient motion)   Response to Aspiration absent response   Diagnostic Statement High aspiration risk. Poor epiglottic movement with posterior motion to contact PPW. Epiglottis did not invert. Chin tuck posture did not eliminate risk for penetration/aspiration and may have worsened overall swallow function. Laryngeal penetration during and after swallows due to pharyngeal residue. Cued cough appeared to clear majority of material in laryngeal vestibule.   VFSS Eval: Slightly Thick Liquids   Mode of Presentation cup;self-fed   Order of Presentation 8   Preparatory Phase poor bolus control  (piecemeal swallows)   Oral Phase impaired AP movement;premature pharyngeal entry;residue in oral cavity   Bolus Location When Swallow Triggered pyriforms   Pharyngeal Phase impaired hyolaryngel excursion;impaired epiglottic movement;impaired tongue base retraction;residue in vallecula;residue in pyriform sinus;impaired pharyngoesophageal segment opening  (moderate residue)   Rosenbek's Penetration Aspiration Scale 3 - contrast remains above the vocal cords, visable residue remains (penetration)   VFSS Eval: Mildly Thick Liquids   Mode of Presentation cup;self-fed   Order of Presentation 9, 10, 13, 14   Preparatory Phase poor bolus control  (piecemeal swallows)   Oral Phase impaired AP movement;residue in oral cavity;premature pharyngeal entry   Bolus Location When Swallow Triggered pyriforms   Pharyngeal Phase impaired hyolaryngel excursion;impaired epiglottic movement;impaired tongue base retraction;pharyngeal wall coating;residue in vallecula;residue in pyriform sinus;impaired pharyngoesophageal segment opening  (moderate-severe vallecular; mild PS residue; minimal PPW residue)   Rosenbek's Penetration Aspiration Scale 5 - contrast contacts vocal cords, visible residue remains (penetration)    Response to Aspiration absent response   Diagnostic Statement High aspiration risk. Poor epiglottic movement with posterior motion to contact PPW. Epiglottis did not invert. Chin tuck posture did not eliminate risk for penetration/aspiration and may have worsened overall swallow function. Laryngeal penetration during and after swallows due to pharyngeal residue. Cued cough appeared to clear majority of material in laryngeal vestibule.   VFSS Evaluation: Puree Solid Texture Trial   Mode of Presentation, Puree spoon;fed by clinician   Order of Presentation 3, 11   Preparatory Phase poor bolus control;prolonged bolus preparation  (piecemeal deglutition)   Oral Phase, Puree impaired AP movement;premature pharyngeal entry;residue in oral cavity   Bolus Location When Swallow Triggered valleculae   Pharyngeal Phase, Puree impaired hyolaryngel excursion;impaired epiglottic movement;impaired tongue base retraction;residue in vallecula;residue in pyriform sinus;impaired pharyngoesophageal segment opening  (severe vallecular residue; moderate PS residue)   Rosenbek's Penetration Aspiration Scale: Puree Food Trial Results 1 - no aspiration, contrast does not enter airway   VFSS Evaluation: Solid Food Texture Trial   Mode of Presentation, Solid spoon;fed by clinician   Order of Presentation 4   Preparatory Phase poor bolus control;prolonged bolus preparation  (piecemeal swallows)   Oral Phase, Solid impaired AP movement;residue in oral cavity;premature pharyngeal entry   Bolus Location When Swallow Triggered valleculae   Pharyngeal Phase, Solid impaired hyolaryngel excursion;impaired epiglottic movement;impaired tongue base retraction;residue in vallecula;residue in pyriform sinus;impaired pharyngoesophageal segment opening  (moderate vallecular residue; trace PS residue)   Rosenbek's Penetration Aspiration Scale: Solid Food Trial Results 1 - no aspiration, contrast does not enter airway   Esophageal Phase of Swallow   Patient  reports or presents with symptoms of esophageal dysphagia Yes   Esophageal sweep performed during today s vidofluoroscopic exam  No   Esophageal comments Not able to complete due to equipment limitations and patient weakness.   Swallowing Recommendations   Diet Consistency Recommendations regular diet;thin liquids (level 0)  (pending [facility] discussion with MD)   Supervision Level for Intake 1:1 supervision needed   Mode of Delivery Recommendations bolus size, small;no straws;slow rate of intake   Swallowing Maneuver Recommendations alternate food and liquid intake;supraglottic swallow   Monitoring/Assistance Required (Eating/Swallowing) stop eating activities when fatigue is present;monitor for cough or change in vocal quality with intake   Recommended Feeding/Eating Techniques (Swallow Eval) maintain upright posture during/after eating for 30 minutes;minimize distractions during oral intake;provide 6 smaller meals throughout day;set-up and prepare tray   Medication Administration Recommendations, Swallowing (SLP) Crushed   Comment, Swallowing Recommendations May consider repeat VFSS in 4-6 weeks pending progress.   General Therapy Interventions   Planned Therapy Interventions Dysphagia Treatment   Dysphagia treatment Oropharyngeal exercise training;Modified diet education;Instruction of safe swallow strategies   Clinical Impression   Criteria for Skilled Therapeutic Interventions Met (SLP Eval) Yes, treatment indicated   SLP Diagnosis Dysphagia   Clinical Impression Comments Moderate-severe oropharyngeal dysphagia under fluoroscopy today in setting of generalized weakness, aspiration pneumonia and prior CVA. Suspect acute on chronic dysphagia component. Patient also with hx of GERD and suspect esophageal dysphagia component. Deficits include poor oral bolus control, delayed swallow response, poor epiglottic movement (posterior motion only with no inversion), reduced hyolaryngeal excursion, reduced BOT retraction,  reduced pharyngeal constriction, impaired UES function. These deficits resulted in variable levels of pharyngeal residuals (mild to severe and increasingly over course of study), laryngeal penetration of thin, slightly thick and mildly thick liquids during and after swallows (due to pharyngeal residuals). Possible eventual trace aspiration of thin and mildly thick liquid upon image review (although difficult to view due to patient motion). Chin tuck posture did not eliminate laryngeal penetration risk or pharyngeal residue and appeared to worsen overall swallow function. Cued cough appeared to clear material in laryngeal vestibule, although patient at risk for further laryngeal penetration given pharyneal residue. Patient noted to squeeze vallecular residue up and into oral cavity x1 during study. Patient at risk for aspiration across PO intake based on exam today. Discussed case with MD. If patient wishes to continue oral diet, could consider continuing regular diet and thin liquids given 1:1 supervision/assist to implement swallow strategies (fully upright position, no straws, small single sips by spoon or cup, cough/clear throat volitionally after sips, alternate liquids/solids). Patient at risk for aspiration with swallow strategies and with diet modifications (aspiration of thin liquids may result in less pulmonary injury than thickened liquids). To avoid aspiration, recommend NPO status with alternate nutrition/hydration/medication source if this would align with patient's GOC. This would not prevent aspiration related to feeding tube or reflux aspiration. Defer to MD for diet order. Patient may benefit from esophagram and/or GI consult given hx of GERD. SLP to continue follow.   SLP Total Evaluation Time   Evaluation, videofluoroscopic eval of swallow function Minutes (34483) 10   SLP Goals   Therapy Frequency (SLP Eval) 5 times/week   SLP Predicted Duration/Target Date for Goal Attainment 11/15/23   SLP  Goals Swallow   SLP: Safely tolerate diet without signs/symptoms of aspiration Regular diet;Thin liquids;With use of swallow precautions;With use of compensatory swallow strategies;With assistance/supervision   Interventions   Interventions Quick Adds Swallowing Dysfunction   Swallowing Dysfunction &/or Oral Function for Feeding   Treatment of Swallowing Dysfunction &/or Oral Function for Feeding Minutes (61579) 8   Symptoms Noted During/After Treatment None   Treatment Detail/Skilled Intervention Provided education about risk for aspiration and swallow strategies.   SLP Discharge Planning   SLP Plan diet f/u; swallow strategy training; trial OPE   SLP Discharge Recommendation home with home health;sub acute care setting   SLP Rationale for DC Rec Below baseline for swallow function (suspect acute on chronic dysphagia).   SLP Brief overview of current status  Patient at risk for aspiration across PO intake based on exam today. Discussed case with MD. If patient wishes to continue oral diet, could consider continuing regular diet and thin liquids given 1:1 supervision/assist to implement swallow strategies (fully upright position, no straws, small single sips by spoon or cup, cough/clear throat volitionally after sips, alternate liquids/solids). Patient at risk for aspiration with swallow strategies and with diet modifications (aspiration of thin liquids may result in less pulmonary injury than thickened liquids). To avoid aspiration, recommend NPO status with alternate nutrition/hydration/medication source if this would align with patient's GOC. This would not prevent aspiration related to feeding tube or reflux aspiration. Defer to MD for diet order. Patient may benefit from esophagram and/or GI consult given hx of GERD. SLP to continue to follow.   Total Session Time   Total Session Time (sum of timed and untimed services) 18

## 2023-10-12 ENCOUNTER — APPOINTMENT (OUTPATIENT)
Dept: PHYSICAL THERAPY | Facility: HOSPITAL | Age: 53
DRG: 917 | End: 2023-10-12
Payer: COMMERCIAL

## 2023-10-12 VITALS
HEIGHT: 72 IN | TEMPERATURE: 97.5 F | SYSTOLIC BLOOD PRESSURE: 109 MMHG | RESPIRATION RATE: 20 BRPM | WEIGHT: 148.15 LBS | DIASTOLIC BLOOD PRESSURE: 72 MMHG | BODY MASS INDEX: 20.07 KG/M2 | OXYGEN SATURATION: 95 % | HEART RATE: 98 BPM

## 2023-10-12 DIAGNOSIS — Z09 HOSPITAL DISCHARGE FOLLOW-UP: ICD-10-CM

## 2023-10-12 LAB — GLUCOSE BLDC GLUCOMTR-MCNC: 103 MG/DL (ref 70–99)

## 2023-10-12 PROCEDURE — 99239 HOSP IP/OBS DSCHRG MGMT >30: CPT | Performed by: FAMILY MEDICINE

## 2023-10-12 PROCEDURE — 250N000013 HC RX MED GY IP 250 OP 250 PS 637: Performed by: INTERNAL MEDICINE

## 2023-10-12 PROCEDURE — 97530 THERAPEUTIC ACTIVITIES: CPT | Mod: GP

## 2023-10-12 PROCEDURE — 250N000013 HC RX MED GY IP 250 OP 250 PS 637: Performed by: FAMILY MEDICINE

## 2023-10-12 PROCEDURE — 250N000011 HC RX IP 250 OP 636: Mod: JZ | Performed by: STUDENT IN AN ORGANIZED HEALTH CARE EDUCATION/TRAINING PROGRAM

## 2023-10-12 PROCEDURE — 250N000013 HC RX MED GY IP 250 OP 250 PS 637: Performed by: STUDENT IN AN ORGANIZED HEALTH CARE EDUCATION/TRAINING PROGRAM

## 2023-10-12 PROCEDURE — 97116 GAIT TRAINING THERAPY: CPT | Mod: GP

## 2023-10-12 RX ORDER — PROPRANOLOL HCL 60 MG
60 CAPSULE, EXTENDED RELEASE 24HR ORAL EVERY EVENING
Status: DISCONTINUED | OUTPATIENT
Start: 2023-10-12 | End: 2023-10-12 | Stop reason: HOSPADM

## 2023-10-12 RX ADMIN — ENOXAPARIN SODIUM 40 MG: 40 INJECTION SUBCUTANEOUS at 08:31

## 2023-10-12 RX ADMIN — AMOXICILLIN AND CLAVULANATE POTASSIUM 1 TABLET: 875; 125 TABLET, FILM COATED ORAL at 08:35

## 2023-10-12 RX ADMIN — DIVALPROEX SODIUM 250 MG: 125 CAPSULE, COATED PELLETS ORAL at 10:06

## 2023-10-12 RX ADMIN — Medication 81 MG: at 08:35

## 2023-10-12 ASSESSMENT — ACTIVITIES OF DAILY LIVING (ADL)
ADLS_ACUITY_SCORE: 33
ADLS_ACUITY_SCORE: 41
ADLS_ACUITY_SCORE: 33
ADLS_ACUITY_SCORE: 42

## 2023-10-12 NOTE — DISCHARGE INSTRUCTIONS
Sit upright when eating try to eat soft foods  Avoid alcohol and weed   See primary Dr in one week   Home care will call with time and date for visits

## 2023-10-12 NOTE — PLAN OF CARE
Goal Outcome Evaluation:    Problem: Gas Exchange Impaired  Goal: Optimal Gas Exchange  Outcome: Progressing  Intervention: Optimize Oxygenation and Ventilation  Recent Flowsheet Documentation  Taken 10/11/2023 2000 by Ravi Black RN  Head of Bed (HOB) Positioning: HOB at 60-90 degrees  Taken 10/11/2023 1810 by Ravi Black RN  Head of Bed (HOB) Positioning: HOB at 20-30 degrees  Taken 10/11/2023 1628 by Ravi Black RN  Head of Bed (HOB) Positioning: HOB at 20-30 degrees     Problem: Fall Injury Risk  Goal: Absence of Fall and Fall-Related Injury  Outcome: Progressing  Intervention: Identify and Manage Contributors  Recent Flowsheet Documentation  Taken 10/11/2023 1628 by Ravi Black RN  Medication Review/Management: medications reviewed  Intervention: Promote Injury-Free Environment  Recent Flowsheet Documentation  Taken 10/11/2023 1628 by Ravi Black, LATISHA  Safety Promotion/Fall Prevention: activity supervised     Pt A/Ox4. VSS. Supervision needed with food/drinks. Frequent coughs after each bite of food. LS are clear and diminished. Pt needs reminders to take sips of fluids due to failing swallow study this afternoon and risk for aspiration. Pt uses suction independently. Ate 100% of dinner. Left side weakness noted. Reposition every 2 hours. Takes meds crushed in pudding. Used bedside urinal. Personal wc in bathroom. Pt verbalized being anxious to go home tomorrow; declines TCU, rather have Home Care.

## 2023-10-12 NOTE — PROGRESS NOTES
CLINICAL NUTRITION SERVICES - ASSESSMENT NOTE     Nutrition Prescription    RECOMMENDATIONS FOR MDs/PROVIDERS TO ORDER:  Multivitamin    Malnutrition Status:    Moderate in chronic illness    Recommendations already ordered by Registered Dietitian (RD):  None at this time    Future/Additional Recommendations:  Monitor intake, weight, labs     REASON FOR ASSESSMENT  Tripp Sanders is a/an 53 year old male assessed by the dietitian for Admission Nutrition Risk Screen for positive weight loss and poor appetite.    NUTRITION HISTORY  Met with pt, so-so appetite, typically eats 2 meals per day. Denied issues chewing or with nausea and vomiting, noted being seen by speech for dysphagia. Will eat cereal with skim milk, sometimes a chicken pot pie or other microwave meal. States smokes weed to increase his appetite but does not give other specifics of what he eats. Does not give specifics on weight loss. Does not believe in supplements. Is wanting to gain weight, but not fat. Encouraged pt to eat more frequently than 2 meals per day. Per pt plan to discharge today.    53M presenting with subacute weakness, confusion; pmhx includes CVA (LUE weakness, dysphagia), schizoaffective-bipolar, GERD, alcohol dependence (in remission), anxiety; admitted for aspiration PNA.     Video swallow 10/11: If patient wishes to continue oral diet, could consider continuing regular diet and thin liquids given 1:1 supervision/assist to implement swallow strategies (fully upright position, no straws, small single sips by spoon or cup, cough/clear throat volitionally after sips, alternate liquids/solids). Patient at risk for aspiration with swallow strategies and with diet modifications (aspiration of thin liquids may result in less pulmonary injury than thickened liquids). To avoid aspiration, recommend NPO status with alternate nutrition/hydration/medication source if this would align with patient's GOC. * Noted pt would not want a TF per MD  "notes    CURRENT NUTRITION ORDERS  Diet: Regular  Intake/Tolerance: 100% of dinner last night, 50% of breakfast today per flowsheets    LABS  Labs reviewed    MEDICATIONS  Medications reviewed    ANTHROPOMETRICS  Height: 182.9 cm (6' 0\")  Most Recent Weight: 67.2 kg (148 lb 2.4 oz)    IBW: 81 kg  BMI: Normal BMI  Weight History:   Wt Readings from Last 20 Encounters:   10/10/23 67.2 kg (148 lb 2.4 oz)   08/24/23 69.9 kg (154 lb)   06/15/23 73.8 kg (162 lb 12 oz)   04/12/23 80.7 kg (178 lb)   11/22/22 93 kg (205 lb)   10/11/22 93.2 kg (205 lb 6.4 oz)   02/14/22 105.7 kg (233 lb)   12/13/21 106.1 kg (234 lb)   10/12/21 109.3 kg (241 lb)   08/03/21 109.9 kg (242 lb 4 oz)   07/01/21 111.6 kg (246 lb)   05/24/21 113.4 kg (250 lb)   04/20/21 112 kg (247 lb)   03/16/21 112.9 kg (249 lb)   02/11/21 110.6 kg (243 lb 12 oz)   01/06/21 106.6 kg (235 lb)   12/14/20 103.9 kg (229 lb)   05/26/20 102.5 kg (226 lb)   04/21/20 98 kg (216 lb)   03/18/20 100.7 kg (222 lb)   Weight loss of 14# (8.6%) x 4 months    Dosing Weight: 67.2 kg    ASSESSED NUTRITION NEEDS  Estimated Energy Needs: 4890-6389 kcals/day (30 - 35 kcals/kg )  Justification: Increased needs  Estimated Protein Needs:  grams protein/day (1.2 - 1.5 grams of pro/kg)  Justification: Increased needs  Estimated Fluid Needs: 1323-4361 mL/day (1 mL/kcal)   Justification: Maintenance    PHYSICAL FINDINGS  See malnutrition section below.    MALNUTRITION:  % Weight Loss:  > 7.5% in 3 months (severe malnutrition)  % Intake:  Decreased intake does not meet criteria for malnutrition   Subcutaneous Fat Loss:  Orbital region mild depletion and Upper arm region mild depletion  Muscle Loss:  Temporal region mild depletion, Clavicle bone region mild depletion, and Dorsal hand region moderate depletion  Fluid Retention:  trace L legs, ankles, feet    Malnutrition Diagnosis: Moderate malnutrition  In Context of:  Chronic illness or disease    NUTRITION DIAGNOSIS  Malnutrition " related to poor appetite as evidenced by weight loss of 8.6% x 4 months, mild loss of muscle and fat, trace edema.      INTERVENTIONS  Implementation  Nutrition Education: No education needs assessed at this time   None at this time     Goals  Maintain weight  Patient to consume % of nutritionally adequate meals three times per day, or the equivalent with supplements/snacks.     Monitoring/Evaluation  Progress toward goals will be monitored and evaluated per protocol.

## 2023-10-12 NOTE — PLAN OF CARE
Speech Language Therapy Discharge Summary    Reason for therapy discharge:    Discharged to home.    Progress towards therapy goal(s). See goals on Care Plan in [facility] electronic health record for goal details.  Goals not met.  Barriers to achieving goals:   discharge from facility.    Therapy recommendation(s):    Patient at risk for aspiration across PO intake based on video swallow study 10/11/23. Discussed case with MD. If patient wishes to continue oral diet, could consider continuing regular diet and thin liquids given 1:1 supervision/assist to implement swallow strategies (fully upright position, no straws, small single sips by spoon or cup, cough/clear throat volitionally after sips, alternate liquids/solids). Patient at risk for aspiration with swallow strategies and with diet modifications (aspiration of thin liquids may result in less pulmonary injury than thickened liquids). To avoid aspiration, recommend NPO status with alternate nutrition/hydration/medication source if this would align with patient's GOC. This would not prevent aspiration related to feeding tube or reflux aspiration. Defer to MD for diet order. Patient may benefit from esophagram and/or GI consult given hx of GERD. SLP to continue to follow.

## 2023-10-12 NOTE — PROGRESS NOTES
Care Management Discharge Note    Discharge Date: 10/12/2023       Discharge Disposition: Home Care    Discharge Services: None    Discharge DME: None    Discharge Transportation: family    Private pay costs discussed: Not applicable    Does the patient's insurance plan have a 3 day qualifying hospital stay waiver?  Yes     Which insurance plan 3 day waiver is available? Alternative insurance waiver    Will the waiver be used for post-acute placement? No    PAS Confirmation Code: [id number]  Patient/family educated on Medicare website which has current facility and service quality ratings: yes    Education Provided on the Discharge Plan: Yes  Persons Notified of Discharge Plans: per care team.    Patient/Family in Agreement with the Plan: yes    Handoff Referral Completed: Yes    Additional Information:  CM spoke with MD, plan for discharge home today with home care.   Patient discharging to home with his cousin and brother.    CM called cousin and said he can come pick patient up now.  CM notified RN, RN asked for an hour to get things ready.  CM called cousin again, no answer, unable to leave voicemail, left SMS message with phone number to call back.      Patient accepted to [facility] PT, OT, SLP.      No further CM needs identified.      Della Devine RN

## 2023-10-12 NOTE — PLAN OF CARE
Problem: Pulmonary Impairment  Goal: Effective Airway Clearance  Outcome: Progressing   Goal Outcome Evaluation:  Tripp's lungs are coarse with rhonchi.  They clear a little with coughing, but he has a hard time giving a strong enough cough to clear.  He is on 1lt nasal cannula oxygen satting 94%.

## 2023-10-12 NOTE — PLAN OF CARE
Physical Therapy Discharge Summary    Reason for therapy discharge:    Discharged to home.    Progress towards therapy goal(s). See goals on Care Plan in [facility] electronic health record for goal details.  Goals partially met.  Barriers to achieving goals:   discharge from facility.    Therapy recommendation(s):    Continued therapy is recommended.  Rationale/Recommendations:  recommending TCU d/t poor endurance and limited mobility.

## 2023-10-12 NOTE — DISCHARGE SUMMARY
[facility]  Hospitalist Discharge Summary      Date of Admission:  10/9/2023  Date of Discharge:  10/12/2023  1:26 PM  Discharging Provider: Glendy Long MD  Discharge Service: Hospitalist Service    Discharge Diagnoses     Acute encephalopathy, resolved.  Secondary to her marijuana ingestion  Aspiration pneumonitis  Severe dysphagia, presumed chronic  Weakness, declined TCU  Schizoaffective disorder  Fall, secondary to above.  Work-up negative  Marijuana use    Clinically Significant Risk Factors          Follow-ups Needed After Discharge   Follow-up Appointments     Follow-up and recommended labs and tests       Follow up with primary care provider, Telly Arias, within 7 days for   hospital follow- up.    Follow up with home care for all therapes but most importantly SPEECH   THERAPY to decrease your risk of aspiration              Discharge Disposition   Discharged to home with home care  Condition at discharge: Stable    Hospital Course   Tripp Sanders is a 53M with history schizoaffective disorder, daily marijuana use, and previous CVA with some known underlying dysphagia presented to the hospital with a fall and acute encephalopathy.  Patient was brought into the hospital where he was treated with CT scans for trauma protocol which were negative, neurology consult, antibiotics, speech evaluation, and improved/stabilized.  Admitted to eating 8 marijuana cookies when he should have had 0.5 of them before blacking out and this was presumed to be because of encephalopathy.  Incidental findings during time of work-up included aspiration pneumonia/pneumonitis which was not clearly clinically significant to him.  Speech was involved and underwent video swallow showing chronic aspiration with all textures.  Discussed with patient and family and he declines feeding tube placement but was agreeable to speech therapy as an outpatient as discussed with him risks of chronic aspiration including  risk of pneumonia and death.  Family was aware of this as well.  Was found to be profoundly weak with TCU recommended but she declined and family was in support of this.  Overall remained stable and not hypoxic while here was placed on oral antibiotics for aspiration pneumonitis discharged in good condition with detailed hospital course per below    #aspiration pneumonia  #generalised weakness  #Presumed chronic dysphagia  --- Suspect chronic aspiration since stroke.  --- Admit CT shows peribronchial infiltrates bilateral lungs consistent with pneumonia or aspiration pneumonitis; mediastinal lymph node enlargement also seen  --- Known chronic dysphagia post CVA.   ---NOT septic.  -Unasyn 3g IV q6h started on admission.  transitioned to oral Augmentin and will finish 5-7 day course  --- Discussed extensively with speech and then also discussed extensively with patient concerns for no safe swallowing textures at this time.  I briefly discussed feeding tube and patient stated he would never like this.  He appeared agreeable to outpatient speech therapy which may or may not be helpful..  Speech agreed that this was worth trying.  Discussed this with his cousin who is one of his informal caregivers who has noted increasing cough and he is aware of risks of aspiration and importance for follow-up with outpatient speech  --- PT and OT recommending TCU-he declined and we set up home care    #Acute encephalopathy  --- Resolved.  Suspect related to unintentional marijuana overuse/ingestion  --- On admit family concerned about possible subacute CVA, per concerns of family.   ---no new deficits identified,  ----Head CT negative  ---MRI initially ordered however after neurology consulted and patient back to baseline they canceled MRI  ---Neurology consulted, signed off 10/10 has no new neurologic deficits found  -Atorvastatin 80mg PO every day  -ASA 81mg PO every day    #schizoaffective disorder  -depakote 500mg PO at  "bedtime  -seroquel 500mg PO at bedtime  -Lives independently with family  -He is his own medical decision maker.  Discussed this with his cousin that patient makes poor medical decisions.  He is attempting to understand paperwork to become guardian/PCA the patient does live with him and he does help care for him    THC use/misuse  ---states at 8 THC cookies istead of 0.5 as recommended and \"blacked out\"  ---smokes THC daily      Consultations This Hospital Stay   NEUROLOGY IP STROKE CONSULT  PHYSICAL THERAPY ADULT IP CONSULT  OCCUPATIONAL THERAPY ADULT IP CONSULT  SPEECH LANGUAGE PATH ADULT IP CONSULT  CARE MANAGEMENT / SOCIAL WORK IP CONSULT    Code Status   Full Code    Time Spent on this Encounter   I, Glendy Long MD, personally saw the patient today and spent greater than 30 minutes discharging this patient.       Glendy Long MD  [address] [address]  Phone: [phone]  Fax: [phone]  ______________________________________________________________________    Physical Exam   Vital Signs: Temp: 97.5  F (36.4  C) Temp src: Oral BP: 109/72 Pulse: 98   Resp: 20 SpO2: 95 % O2 Device: Nasal cannula Oxygen Delivery: 1 LPM  Weight: 148 lbs 2.39 oz  General Appearance: Pleasant male, somewhat frail in appearance.  No coughing while I am in there today.  Pressured speech unchanged from previous  Respiratory: Mild rhonchi bilaterally  Cardiovascular: Regular rate and rhythm  GI: Soft nontender with hepatosplenomegaly  Skin: 1 static cut from fall above his right eyebrow.  Not bleeding.  No significant lower extremity edema  Other: Neurologically grossly intact.  No focal deficits appreciated.       Primary Care Physician   Telly Arias    Discharge Orders      Home Care Referral      Reason for your hospital stay    Altered mental status, aspiration pneumonitis, swallow dysfunction, marijuana abuse     Follow-up and recommended labs and tests     Follow up " with primary care provider, Telly Arias, within 7 days for hospital follow- up.    Follow up with home care for all therapes but most importantly SPEECH THERAPY to decrease your risk of aspiration     Activity    Your activity upon discharge: activity as tolerated     Diet    Follow this diet upon discharge: Orders Placed This Encounter      Combination Diet Regular Diet; Thin Liquids (level 0)       Significant Results and Procedures   Most Recent 3 CBC's:  Recent Labs   Lab Test 10/10/23  0552 10/09/23  1911 06/15/23  1048   WBC 5.1 7.0 5.7   HGB 11.7* 13.2* 14.7   MCV 94 94 95   * 154 228     Most Recent 3 BMP's:  Recent Labs   Lab Test 10/12/23  0812 10/11/23  0658 10/10/23  1527 10/10/23  0552 10/09/23  2119 10/09/23  1911 01/22/20  1616 12/23/19  1519   NA  --   --   --  140  --  136  --  140   POTASSIUM  --  3.5 3.9 3.3*  --  3.8   < > 3.5   CHLORIDE  --   --   --  108*  --  100  --  108*   CO2  --   --   --  24  --  23  --  23   BUN  --   --   --  17.3  --  21.7*  --  14   CR  --   --   --  0.69 0.84 0.94  --  1.17   ANIONGAP  --   --   --  8  --  13  --  9   WALT  --   --   --  8.8  --  9.6  --  10.1   *  --   --  91  --  133*   < > 115    < > = values in this interval not displayed.   ,   Results for orders placed or performed during the hospital encounter of 10/09/23   XR Chest Port 1 View    Narrative    EXAM: XR CHEST PORT 1 VIEW  LOCATION: [facility] [place]  DATE: 10/9/2023    INDICATION: fall  COMPARISON: 12/23/2019      Impression    IMPRESSION: Negative chest.   XR Pelvis Port 1/2 Views    Narrative    EXAM: XR PELVIS PORT 1/2 VIEWS  LOCATION: [facility] [place]  DATE: 10/9/2023    INDICATION: fall, low blood pressure, pain  COMPARISON: None.      Impression    IMPRESSION: Limited evaluation due to osteopenia, positioning, and overlying bowel gas. No definite fracture is identified. There is normal joint alignment. Mild degenerative changes  throughout the pelvis.   POC US ABDOMEN LIMITED    Narrative    Rubén Kitchen MD     10/9/2023 10:09 PM  POC US ABDOMEN LIMITED    Date/Time: 10/9/2023 10:06 PM    Performed by: Rubén Kitchen MD  Authorized by: Rubén Kitchen MD    Comments:      E-FAST negative.  Technically difficult examination.  Images uploaded   CT Chest/Abdomen/Pelvis w Contrast    Narrative    EXAM: CT CHEST/ABDOMEN/PELVIS W CONTRAST  LOCATION: [facility]  DATE: 10/9/2023    INDICATION: Fall, hypotensive  COMPARISON: None.  TECHNIQUE: CT scan of the chest, abdomen, and pelvis was performed following injection of IV contrast. Multiplanar reformats were obtained. Dose reduction techniques were used.   CONTRAST: 90 ml Isovue 370    FINDINGS:   LUNGS AND PLEURA: Peribronchial infiltrates in both lower lobes and to a lesser extent in the upper lobes. Debris within the lower lobe bronchi. No pleural effusion or pneumothorax.    MEDIASTINUM/AXILLAE: Multiple mildly enlarged lymph nodes are most likely reactive.    CORONARY ARTERY CALCIFICATION: None.    HEPATOBILIARY: Normal.    PANCREAS: Normal.    SPLEEN: Normal.    ADRENAL GLANDS: Normal.    KIDNEYS/BLADDER: Benign renal cysts.    BOWEL: Normal.    LYMPH NODES: Normal.    VASCULATURE: Unremarkable.    PELVIC ORGANS: Normal. No hemoperitoneum.    MUSCULOSKELETAL: Normal.      Impression    IMPRESSION:  1.  Peribronchial infiltrates in both lungs consistent with pneumonia or aspiration pneumonitis.  2.  No evidence of an acute traumatic injury in the chest, abdomen, or pelvis.   Head CT w/o contrast    Narrative    EXAM: CT HEAD W/O CONTRAST, CT CERVICAL SPINE W/O CONTRAST  LOCATION: [facility]  DATE/TIME: 10/9/2023 8:34 PM CDT    INDICATION: Traumatic head and neck injury. Fall, hypotension.  COMPARISON: None available.  TECHNIQUE:   1. Routine CT head without IV contrast. Multiplanar reformats. Dose reduction techniques were used.  2. Routine  CT cervical spine without IV contrast. Multiplanar reformats. Dose reduction techniques were used.    FINDINGS:   HEAD CT:   INTRACRANIAL CONTENTS: No intracranial hemorrhage, extraaxial collection, or mass effect.  No CT evidence of acute infarct. Moderate zone of encephalomalacia and marginal gliosis in the right basal ganglia and overlying periventricular white matter and   corona radiata, insula, and overlying frontal operculum. Ex vacuo dilatation of the adjacent right lateral ventricle. Mild presumed chronic small vessel ischemic changes. Mild to moderate generalized volume loss. No hydrocephalus.     VISUALIZED ORBITS/SINUSES/MASTOIDS: No intraorbital abnormality. Mild mucosal thickening scattered about the paranasal sinuses. No middle ear or mastoid effusion.    BONES/SOFT TISSUES: No acute calvarial fracture or scalp hematoma.      CERVICAL SPINE CT:   VERTEBRAE: Vertebral body heights are preserved. Exaggeration of the usual cervical lordosis with additional minimal multilevel degenerative sagittal malalignment. No evidence for acute fracture or posttraumatic subluxation.     CANAL/FORAMINA: No high-grade spinal canal or neural foraminal stenosis. Moderate degenerative neuroforaminal stenosis on the right at C5-C6.    PARASPINAL: Paraspinous soft tissues are grossly unremarkable. Visualized lung fields are unremarkable.          Impression    IMPRESSION:  HEAD CT:  1.  No acute intracranial abnormality or acute chondral fracture.  2.  Moderate zone of encephalomalacia and marginal gliosis in the right cerebral hemisphere as described above, most consistent with chronic right MCA territory infarction.  3.  Additional background mild to moderate chronic age-related changes.    CERVICAL SPINE CT:  1.  No CT evidence for acute fracture or post traumatic subluxation.  2.  No high-grade spinal canal or neuroforaminal stenosis.    Cervical spine CT w/o contrast    Narrative    EXAM: CT HEAD W/O CONTRAST, CT  CERVICAL SPINE W/O CONTRAST  LOCATION: [facility]  DATE/TIME: 10/9/2023 8:34 PM CDT    INDICATION: Traumatic head and neck injury. Fall, hypotension.  COMPARISON: None available.  TECHNIQUE:   1. Routine CT head without IV contrast. Multiplanar reformats. Dose reduction techniques were used.  2. Routine CT cervical spine without IV contrast. Multiplanar reformats. Dose reduction techniques were used.    FINDINGS:   HEAD CT:   INTRACRANIAL CONTENTS: No intracranial hemorrhage, extraaxial collection, or mass effect.  No CT evidence of acute infarct. Moderate zone of encephalomalacia and marginal gliosis in the right basal ganglia and overlying periventricular white matter and   corona radiata, insula, and overlying frontal operculum. Ex vacuo dilatation of the adjacent right lateral ventricle. Mild presumed chronic small vessel ischemic changes. Mild to moderate generalized volume loss. No hydrocephalus.     VISUALIZED ORBITS/SINUSES/MASTOIDS: No intraorbital abnormality. Mild mucosal thickening scattered about the paranasal sinuses. No middle ear or mastoid effusion.    BONES/SOFT TISSUES: No acute calvarial fracture or scalp hematoma.      CERVICAL SPINE CT:   VERTEBRAE: Vertebral body heights are preserved. Exaggeration of the usual cervical lordosis with additional minimal multilevel degenerative sagittal malalignment. No evidence for acute fracture or posttraumatic subluxation.     CANAL/FORAMINA: No high-grade spinal canal or neural foraminal stenosis. Moderate degenerative neuroforaminal stenosis on the right at C5-C6.    PARASPINAL: Paraspinous soft tissues are grossly unremarkable. Visualized lung fields are unremarkable.          Impression    IMPRESSION:  HEAD CT:  1.  No acute intracranial abnormality or acute chondral fracture.  2.  Moderate zone of encephalomalacia and marginal gliosis in the right cerebral hemisphere as described above, most consistent with chronic right MCA  territory infarction.  3.  Additional background mild to moderate chronic age-related changes.    CERVICAL SPINE CT:  1.  No CT evidence for acute fracture or post traumatic subluxation.  2.  No high-grade spinal canal or neuroforaminal stenosis.    POC US ABDOMEN LIMITED    Narrative    Rubén Kitchen MD     10/9/2023 10:09 PM  POC US ABDOMEN LIMITED    Date/Time: 10/9/2023 10:06 PM    Performed by: Rubén Kitchen MD  Authorized by: Rubén Kitchen MD    Procedure details:     Indications comment:  Hypotension  Comments:      E-FAST negative.  Images saved and uploaded.  Technically difficult   exam   CT Thoracic Spine Reconstructed    Narrative    EXAM: CT THORACIC SPINE RECONSTRUCTED, CT LUMBAR SPINE RECONSTRUCTED  LOCATION: [facility]  DATE/TIME: 10/9/2023 8:37 PM CDT    INDICATION: Traumatic back injury. Fall. Back pain. Hypotension.  COMPARISON: None.  TECHNIQUE: Dedicated axial, sagittal, and coronal images of the thoracic and lumbar spine were generated utilizing CT chest/abdomen/pelvis source data, which are reported separately. Dose reduction techniques were used.     FINDINGS:  THORACIC SPINE:  VERTEBRAE: Vertebral body heights are maintained, with mild, chronic degenerative appearing, height loss noted along the apposing endplates at T11-T12. Mild broad S-shaped curvature of the mid/lower thoracic and upper lumbar spine. Sagittal alignment is   normally maintained. No evidence for acute fracture or posttraumatic subluxation.     CANAL/FORAMINA: No high-grade spinal canal or neural foraminal stenosis.    PARASPINAL: Visualized ribs are intact. Please see dedicated chest CT report for pulmonary and thoracic soft tissue findings.      LUMBAR SPINE:  VERTEBRAE: There are 5 nonrib-bearing lumbar type vertebral bodies. L4 limbus vertebral body noted. Otherwise normal vertebral body heights. Normal sagittal alignment is maintained. No evidence for acute fracture or posttraumatic  subluxation.     CANAL/FORAMINA: No canal or neural foraminal stenosis.    PARASPINAL: The visualized bony pelvis is intact. Mild bilateral sacroiliac DJD. Please see dedicated abdomen/pelvis CT report for abdominal and pelvic soft tissue findings.          Impression    IMPRESSION:  THORACIC SPINE:  1.  No acute fracture or traumatic subluxation of the thoracic spine by CT imaging.  2.  No high-grade spinal canal or neuroforaminal stenosis.  3.  Please see dedicated chest CT report for pulmonary and thoracic soft tissue findings.    LUMBAR SPINE:  1.  No acute fracture or traumatic subluxation of the lumbar spine by CT imaging.  2.  No high-grade spinal canal or neuroforaminal stenosis.  3.  Please see dedicated abdomen/pelvis CT report for abdominal and pelvic soft tissue findings.     CT Lumbar Spine Reconstructed    Narrative    EXAM: CT THORACIC SPINE RECONSTRUCTED, CT LUMBAR SPINE RECONSTRUCTED  LOCATION: [facility]  DATE/TIME: 10/9/2023 8:37 PM CDT    INDICATION: Traumatic back injury. Fall. Back pain. Hypotension.  COMPARISON: None.  TECHNIQUE: Dedicated axial, sagittal, and coronal images of the thoracic and lumbar spine were generated utilizing CT chest/abdomen/pelvis source data, which are reported separately. Dose reduction techniques were used.     FINDINGS:  THORACIC SPINE:  VERTEBRAE: Vertebral body heights are maintained, with mild, chronic degenerative appearing, height loss noted along the apposing endplates at T11-T12. Mild broad S-shaped curvature of the mid/lower thoracic and upper lumbar spine. Sagittal alignment is   normally maintained. No evidence for acute fracture or posttraumatic subluxation.     CANAL/FORAMINA: No high-grade spinal canal or neural foraminal stenosis.    PARASPINAL: Visualized ribs are intact. Please see dedicated chest CT report for pulmonary and thoracic soft tissue findings.      LUMBAR SPINE:  VERTEBRAE: There are 5 nonrib-bearing lumbar type  vertebral bodies. L4 limbus vertebral body noted. Otherwise normal vertebral body heights. Normal sagittal alignment is maintained. No evidence for acute fracture or posttraumatic subluxation.     CANAL/FORAMINA: No canal or neural foraminal stenosis.    PARASPINAL: The visualized bony pelvis is intact. Mild bilateral sacroiliac DJD. Please see dedicated abdomen/pelvis CT report for abdominal and pelvic soft tissue findings.          Impression    IMPRESSION:  THORACIC SPINE:  1.  No acute fracture or traumatic subluxation of the thoracic spine by CT imaging.  2.  No high-grade spinal canal or neuroforaminal stenosis.  3.  Please see dedicated chest CT report for pulmonary and thoracic soft tissue findings.    LUMBAR SPINE:  1.  No acute fracture or traumatic subluxation of the lumbar spine by CT imaging.  2.  No high-grade spinal canal or neuroforaminal stenosis.  3.  Please see dedicated abdomen/pelvis CT report for abdominal and pelvic soft tissue findings.     XR Video Swallow with SLP or OT    Narrative    EXAM: XR VIDEO SWALLOW WITH SLP OR OT  LOCATION: [facility]  DATE: 10/11/2023    INDICATION: Difficulty swallowing.  COMPARISON: CT CAP 10/09/2023    TECHNIQUE: Routine swallow study with speech pathology using multiple barium thicknesses.    FINDINGS:   FLUOROSCOPIC TIME: 4.9 minutes  NUMBER OF IMAGES: 14 cine loops    Swallow study with Speech Pathology using multiple barium thicknesses.     Oral phase of swallowing is abnormal with poor control of the bolus with episodes of tongue pumping.     There is overflow of barium into the vallecula and piriform sinuses with variable amounts of retention within the vallecula and piriform sinuses with all compounds.    There is posterior translation of the epiglottis without a normal flip.    There are multiple episodes of penetration during and after swallowing from the residua which slowly descends to the level of the cords. No spontaneous  cough.    Residua within the vallecula is symptomatic with patient making multiple attempts to regurgitate this into the  mouth.    Swallowing with a chin tuck does not decreased the amount of penetration or residual within the vallecula and piriform sinus.    Please see speech pathology note.         Discharge Medications   Discharge Medication List as of 10/12/2023  1:02 PM        START taking these medications    Details   amoxicillin-clavulanate (AUGMENTIN) 875-125 MG tablet Take 1 tablet by mouth every 12 hours for 3 days, Disp-6 tablet, R-0, E-Prescribe           CONTINUE these medications which have NOT CHANGED    Details   atorvastatin (LIPITOR) 80 MG tablet Take 1 tablet (80 mg) by mouth every evening, Disp-90 tablet, R-3, E-Prescribe      divalproex sodium extended-release (DEPAKOTE ER) 500 MG 24 hr tablet Take 500 mg by mouth At Bedtime, Historical      propranolol ER (INDERAL LA) 60 MG 24 hr capsule Take 1 capsule (60 mg) by mouth daily, Disp-90 capsule, R-3, E-Prescribe      !! QUEtiapine (SEROQUEL) 200 MG tablet [QUETIAPINE (SEROQUEL) 200 MG TABLET] Take 200 mg by mouth at bedtime. Take with 300 mg tablet for total of 500 mg at bedtime, Historical      !! QUEtiapine (SEROQUEL) 300 MG tablet [QUETIAPINE (SEROQUEL) 300 MG TABLET] Take 300 mg by mouth at bedtime. Take with 200 mg for total of 500 mg nightly, Historical       !! - Potential duplicate medications found. Please discuss with provider.        Allergies   Allergies   Allergen Reactions    Trazodone Unknown     priapism

## 2023-10-12 NOTE — TREATMENT PLAN
Discharge instructions reviewed with patient and family instructed to sit upright  When eating no straws soft foods, home medications reviewed. Patient will be having  Home Pt and nurse to see, refusing TCU needs assistance with ADLS.  Denies pain instructed to avoid or stop weed and alcohol   Transferred in wheelchair with assist.

## 2023-10-13 ENCOUNTER — PATIENT OUTREACH (OUTPATIENT)
Dept: CARE COORDINATION | Facility: CLINIC | Age: 53
End: 2023-10-13
Payer: COMMERCIAL

## 2023-10-13 LAB
ATRIAL RATE - MUSE: 92 BPM
DIASTOLIC BLOOD PRESSURE - MUSE: 56 MMHG
INTERPRETATION ECG - MUSE: NORMAL
P AXIS - MUSE: 77 DEGREES
PR INTERVAL - MUSE: 140 MS
QRS DURATION - MUSE: 80 MS
QT - MUSE: 362 MS
QTC - MUSE: 447 MS
R AXIS - MUSE: 83 DEGREES
SYSTOLIC BLOOD PRESSURE - MUSE: 87 MMHG
T AXIS - MUSE: 85 DEGREES
VENTRICULAR RATE- MUSE: 92 BPM

## 2023-10-13 NOTE — PLAN OF CARE
Occupational Therapy Discharge Summary    Reason for therapy discharge:    Discharged to home.    Progress towards therapy goal(s). See goals on Care Plan in [facility] electronic health record for goal details.  Goals partially met.  Barriers to achieving goals:   discharge from facility.    Therapy recommendation(s):    Home with assist

## 2023-10-13 NOTE — PROGRESS NOTES
Clinic Care Coordination Contact  [facility]: Post-Discharge Note  SITUATION                                                      Admission:    Admission Date: 10/09/23   Reason for Admission: 1. Aspiration pneumonia, unspecified aspiration pneumonia type, unspecified laterality, unspecified part of lung (H)   2. Acute respiratory failure with hypoxia and hypercapnia (H)   3. Fall, initial encounter   4. Altered mental status, unspecified altered mental status type  Discharge:   Discharge Date: 10/12/23  Discharge Diagnosis:   Acute encephalopathy, resolved.  Secondary to her marijuana ingestion    Aspiration pneumonitis    Severe dysphagia, presumed chronic    Weakness, declined TCU    Schizoaffective disorder    Fall, secondary to above.  Work-up negative    Marijuana use    BACKGROUND                                                      Per hospital discharge summary and inpatient provider notes:    Tripp Sanders is a 53 year old male with a pertinent history of right MCA stroke, left hemiplegia, schizoaffective disorder, alcohol abuse, drug abuse,  who presents to this ED via personal vehicle with roommate for evaluation of fall.      Nurse reports the patient fell today and hit his head sustaining a left eyebrow laceration. Patient has been having an abnormal mentation.      Patient's brother reports the patient had 2 unwitnessed falls today and was found by his roommates. They reports he is wheel chair bound. Patient was last well a couple days ago. Patient does not take all of his medications regularly.       ASSESSMENT           Discharge Assessment  How are you doing now that you are home?: Patient states he is so-so. Patient feels like symptoms are a little better since leaving the hospital.  Denies any new or worsening symptoms. Denies any further falls at home.  How are your symptoms? (Red Flag symptoms escalate to triage hotline per guidelines): Improved  Do you feel your condition is stable  enough to be safe at home until your provider visit?: Yes  Does the patient have their discharge instructions? : No - Review discharge instructions  Does the patient have questions regarding their discharge instructions? : No  Were you started on any new medications or were there changes to any of your previous medications? : Yes  Does the patient have all of their medications?: Yes  Do you have questions regarding any of your medications? : No  Do you have all of your needed medical supplies or equipment (DME)?  (i.e. oxygen tank, CPAP, cane, etc.): Yes  Discharge follow-up appointment scheduled within 14 calendar days? : No  Is patient agreeable to assistance with scheduling? : No         Post-op (Clinicians Only)  Did the patient have surgery or a procedure: No    Patient seemed in a rush to end call and asked RN if we could end the call.  RN did provide [facility]'s phone number as listed in AVS and 24/7 nurse line information for patient.     Patient declined assistance in scheduling hospital follow-up and declined MTM referral.     PLAN                                                      Outpatient Plan:  Follow up with primary care provider, Telly Arias, within 7 days for   hospital follow- up.    Follow up with home care for all therapes but most importantly SPEECH   THERAPY to decrease your risk of aspiration           Future Appointments   Date Time Provider Department Center   10/19/2023  3:30 PM SPRO MA/LPN [facility] SPRO         For any urgent concerns, please contact our 24 hour nurse triage line: [phone])         Anne Zamarripa RN

## 2023-10-16 ENCOUNTER — MEDICAL CORRESPONDENCE (OUTPATIENT)
Dept: HEALTH INFORMATION MANAGEMENT | Facility: CLINIC | Age: 53
End: 2023-10-16

## 2023-10-16 ENCOUNTER — TELEPHONE (OUTPATIENT)
Dept: FAMILY MEDICINE | Facility: CLINIC | Age: 53
End: 2023-10-16
Payer: COMMERCIAL

## 2023-10-16 NOTE — TELEPHONE ENCOUNTER
Home Health Care    Reason for call:  orders    Orders are needed for this patient.  PT: 1x a week for 4 weeks then every other week for 2 more visits  OT: Ingrid  Skilled Nursing: Ingrid, Pt is not always taking his meds correctly since Pt is bipolar, Pt has stage 2 pressure sores, need approval for barrier cream.    : Ingrid  Speech Therapy: Humairaal for swallowing, Pt was in the hospital recently for Aspiration pneumonia.    Pt Provider: Dr. Arias    Phone Number Homecare Nurse can be reached at: [phone]     Can we leave a detailed message on this number? YES      Okay to leave a detailed message?: Yes at Home number on file [phone] (home)

## 2023-10-17 NOTE — TELEPHONE ENCOUNTER
Phone Number Homecare Nurse can be reached at: [phone]            Can we leave a detailed message on this number? YES    Author called and notified provider per MD approval as listed below via voicemail or verbally. Task complete.

## 2023-10-19 ENCOUNTER — OFFICE VISIT (OUTPATIENT)
Dept: FAMILY MEDICINE | Facility: CLINIC | Age: 53
End: 2023-10-19
Payer: COMMERCIAL

## 2023-10-19 VITALS
HEART RATE: 93 BPM | TEMPERATURE: 97.2 F | WEIGHT: 148.75 LBS | BODY MASS INDEX: 20.17 KG/M2 | DIASTOLIC BLOOD PRESSURE: 59 MMHG | RESPIRATION RATE: 16 BRPM | OXYGEN SATURATION: 97 % | SYSTOLIC BLOOD PRESSURE: 85 MMHG

## 2023-10-19 DIAGNOSIS — J96.02 ACUTE RESPIRATORY FAILURE WITH HYPOXIA AND HYPERCAPNIA (H): ICD-10-CM

## 2023-10-19 DIAGNOSIS — J96.01 ACUTE RESPIRATORY FAILURE WITH HYPOXIA AND HYPERCAPNIA (H): ICD-10-CM

## 2023-10-19 DIAGNOSIS — F25.0 SCHIZOAFFECTIVE DISORDER, BIPOLAR TYPE (H): Primary | ICD-10-CM

## 2023-10-19 DIAGNOSIS — I63.511 ACUTE RIGHT MCA STROKE (H): ICD-10-CM

## 2023-10-19 DIAGNOSIS — F19.10 DRUG ABUSE (H): ICD-10-CM

## 2023-10-19 DIAGNOSIS — J69.0 ASPIRATION PNEUMONIA, UNSPECIFIED ASPIRATION PNEUMONIA TYPE, UNSPECIFIED LATERALITY, UNSPECIFIED PART OF LUNG (H): ICD-10-CM

## 2023-10-19 PROCEDURE — 99495 TRANSJ CARE MGMT MOD F2F 14D: CPT | Performed by: FAMILY MEDICINE

## 2023-10-19 PROCEDURE — 96372 THER/PROPH/DIAG INJ SC/IM: CPT | Performed by: FAMILY MEDICINE

## 2023-10-19 NOTE — COMMUNITY RESOURCES LIST (ENGLISH)
10/19/2023   [facility]  N/A  For questions about this resource list or additional care needs, please contact your primary care clinic or care manager.  Phone: [phone]   Email: N/A   Address: [address] [place] [address]   Hours: N/A        Financial Stability       Rent and mortgage payment assistance  1  [facility] - [place] Office - Supportive Housing Assistance Program (SHAP) - Rent and mortgage payment assistance Distance: 1.78 miles      Phone/Virtual   [address] [place] [address]  Language: English, Hmong, Raiza, Mongolian  Hours: Mon - Fri 8:30 AM - 5:00 PM  Fees: Free   Phone: (779) 773-8336 Email: sonal@ong.org Website: http://www.ong.org/[facility]-impact-areas/     2  [facility] Distance: 2.1 miles      Phone/Virtual   [address] [place] [address]  Language: English  Hours: Mon - Fri 9:00 AM - 4:00 PM  Fees: Free   Phone: (403) 839-2299 Email: fidelina@[facility].org Website: http://[facility].org/Critical access hospital/[facility]/          Food and Nutrition       Food pantry  3  [facility] - Food Distribution Program Distance: 1.16 miles      In-Person   [address] [place] [address]  Language: English, Hmong, Occitan  Hours: Tue 3:00 PM - 5:00 PM  Fees: Free   Phone: (581) 283-5552 Email: info@[employer].org Website: http://[facility].org/programs/ziwbne-cmynrdguu-yxuooi/     4  [facility] Distance: 1.76 miles      Pickup   [address] [place] [address]  Language: American Sign Language, English, Hmong, Mongolian, Occitan  Hours: Mon - Fri 12:00 PM - 1:00 PM  Fees: Free   Phone: (565) 795-3377 Email: info@ymcamn.org Website: https://www.Railroad Empirecamn.org/locations/_[facility]_ymca?utm_source=google&utm_medium=local&utm_campaign=local%20search     SNAP application assistance  5   [facility] Distance: 2.1 miles      Phone/Virtual   [address] [place] [address]  Language: English  Hours: Mon - Thu 9:00 AM - 4:00 PM , Fri 9:00 AM - 2:00 PM , Sun 10:00 AM - 12:00 PM  Fees: Free   Phone: (743) 299-9707 Email: fidelina@[facility] Center[facility].[employer]) Website: http://Restaro.org/[facility]/gb-txxf-hacfiSummit Healthcare Regional [facility]/     6  [place] Department of Human Services - MNFoodHelper (SNAP) Distance: 2.91 miles      Phone/Virtual   [address] [address] [place] [address]  Language: English, Hmong, American, Kyrgyz, Australian, Citizen of [place]  Hours: Mon - Fri 9:00 AM - 5:00 PM  Fees: Free   Phone: (477) 404-5560 Website: https://[place].gov/dhs/people-we-serve/adults/economic-assistance/food-nutrition/programs-and-services/supplemental-nutrition-assistance-program.jsp     Soup kitchen or free meals  7  [facility] - Loaves and Fishes - Loaves and Fishes Distance: 1.95 miles      [facility]   [address] [place] [address]  Language: English  Hours: Wed 5:30 PM - 6:30 PM  Fees: Free   Phone: (454) 408-6076 Email: office@[facility]Saint [facility].org Website: https://www.[facility].org     8  [facility] Distance: 2.1 miles      [facility]   [address] [place] [address]  Language: English  Hours: Mon - Fri 11:45 AM - 12:45 PM  Fees: Free   Phone: (297) 195-5709 Email: fidelina@[facility] Website: http://Restaro.org/[facility]/aw-kgrz-lmlgw-[facility])/          Hotlines and Helplines       Hotline - Housing crisis  9  [employer] [facility] [facility] Distance: 11.24 miles      Phone/Virtual   [address] [place] [address]  Language: English  Hours: Mon - Sun Open 24 Hours   Phone: (812) 137-7347 Email: communications@oscs-[place].org Website: https://oscs-[place].org/oursaviourshousing/     10  [place] [facility] Distance: 12.84 miles      Phone/Virtual   [address] [place] [address]  Language:  English  Hours: Mon - Sun Open 24 Hours   Phone: (656) 216-1348 Email: info@[employer] Website: http://www.Spherix.org          Housing       Coordinated Entry access point  11  [facility] Distance: 3.57 miles      In-Person, Phone/Virtual   [address] [place] [address]  Language: English, Zimbabwean  Hours: Mon - Fri 8:30 AM - 4:30 PM  Fees: Free   Phone: (316) 893-3951 Email: info@mnBrocade Communications Systems.[employer] Website: https://www.[facility].org/locations/[facility]-clinic/     12  [facility] - Coordinated Access to Housing and Shelter (CAHS) - Coordinated Access Distance: 5.96 miles      In-Person, Phone/Virtual   [address] [place] [address]  Language: English  Hours: Mon - Fri 8:00 AM - 4:30 PM  Fees: Free   Phone: (686) 573-9038 Website: https://www.[facility]./residents/assistance-support/assistance/housing-services-support     Drop-in center or day shelter  13  [facility] Distance: 2.09 miles      In-Person   [address] [place] [address]  Language: English  Hours: Mon - Fri 9:00 AM - 4:00 PM  Fees: Free   Phone: (845) 699-2702 Email: hugo@[employer] Website: http://[employer]     14  [facility] Distance: 11.27 miles      In-Person   [address] [place] [address]  Language: English, Marshallese, Zimbabwean  Hours: Mon - Sat 7:00 AM - 3:00 PM  Fees: Free, Self Pay   Phone: (291) 819-7646 Email: info@[employer] Website: https://www.[employer].org/locations/opportunity-center/     Housing search assistance  15  [facility] - Housing Search Assistance Distance: 3.17 miles      Phone/Virtual   [address] [place] [address]  Language: Ukrainian, English, Hmong, Raiza, Marshallese, Zimbabwean  Hours: Mon - Fri Appt. Only  Fees: Free   Phone: (715) 177-4083 Website: https://[facility].org/      16  [facility] - Online Housing Search Assistance Distance: 6.61 miles      Phone/Virtual   [address] [place] [address]  Language: English  Hours: Mon - Sun Open 24 Hours  Fees: Free   Phone: (248) 276-8102 Email: gaetano@[facility].[employer] Website: https://Peak [employer]/     Shelter for families  17  [facility] Distance: 13.51 miles      In-Person   [address] [place] [address]  Language: English  Hours: Mon - Fri 3:00 PM - 9:00 AM , Sat - Sun Open 24 Hours  Fees: Free   Phone: (338) 942-7504 Ext.1 Website: https://www.saintandrews.Taptica/2020/07/03/emergency-family-shelter/     Shelter for individuals  18  [facility] [place] [facility] - [facility] [place] [facility] Distance: 3.63 miles      In-Person   [address] [place] [address]  Language: English  Hours: Mon - Sun 5:00 PM - 10:00 AM  Fees: Free, Self Pay   Phone: (109) 649-8977 Email: info@[employer] Website: https://www.[facility] Unlimited[facility].org/locations/[facility]-s[place]/     19  [facility] Distance: 11.24 miles      In-Person   [address] [place] [address]  Language: English  Hours: Mon - Sun Open 24 Hours  Fees: Free   Phone: (346) 552-8543 Email: communications@RE2s-mn.org Website: https://[facility].org/oursaviourshousing/          Transportation       Free or low-cost transportation  20  [facility] [place] [employer] Bus Distance: 5.16 miles      In-Person   [address] [place] [address]  Language: English  Hours: Tue 9:00 AM - 2:00 PM  Fees: Self Pay   Phone: (318) 696-6607 Email: info@[employer] Website: http://www.Hochy eto.org/     21  [employer] - Community Bus Loop - Free or low-cost transportation Distance: 6.15 miles      In-Person   [address] [place] [address]  Language: English  Hours: Mon - Fri 9:00 AM - 5:00 PM  Fees: Free   Phone:  (189) 783-6496 Email: info@CEINT Website: https://www.CEINT/     Transportation to medical appointments  22  [facility] - Non-Emergency Medical Transportation Distance: 4 miles      In-Person   [address] [place] [address]  Language: English  Hours: Mon - Fri 8:00 AM - 4:00 PM Appt. Only  Fees: Self Pay   Phone: (713) 776-5184 Website: http://www.[employer].org/Medical-Services/Emergency-medical-services/Non-emergency-transportation/     23  [employer] Distance: 4.88 miles      In-Person   [address] [address]  Language: English  Hours: Mon - Thu 6:00 AM - 6:00 PM , Fri 6:00 AM - 5:00 PM  Fees: Insurance, Self Pay   Phone: (471) 567-3514 Email: office@bCommunities Website: https://www.bCommunities/          Important Numbers & Websites       Emergency Services   911  [facility] Services   311  Poison Control   (185) 987-6606  Suicide Prevention Lifeline   (769) 632-3923 (TALK)  Child Abuse Hotline   (571) 437-3878 (4-A-Child)  Sexual Assault Hotline   (214) 862-8794 (HOPE)  National Runaway Safeline   (734) 592-9460 (RUNAWAY)  All-Options Talkline   (125) 912-5018  Substance Abuse Referral   (806) 950-8583 (HELP)
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
## 2023-10-20 ENCOUNTER — TELEPHONE (OUTPATIENT)
Dept: FAMILY MEDICINE | Facility: CLINIC | Age: 53
End: 2023-10-20
Payer: COMMERCIAL

## 2023-10-20 ENCOUNTER — MEDICAL CORRESPONDENCE (OUTPATIENT)
Dept: HEALTH INFORMATION MANAGEMENT | Facility: CLINIC | Age: 53
End: 2023-10-20
Payer: COMMERCIAL

## 2023-10-20 NOTE — TELEPHONE ENCOUNTER
MD asking nursing to apply barrier cream twice daily and follow up if painful or worsening, is that correct? Thank you!

## 2023-10-20 NOTE — TELEPHONE ENCOUNTER
Is there discharge from the wound site?  Is the area erythematous or red does the patient have a fever?  Skilled nursing approved for vitals nutrition and medication

## 2023-10-20 NOTE — TELEPHONE ENCOUNTER
Home Care is calling regarding an established patient with [facility].       Requesting orders from: Telly Arias  Provider is following patient: Yes  Is this a 60-day recertification request?  No    Orders Requested    Skilled Nursing  Request for initial certification (first set of orders)   Frequency:  1  x/wk for 6 wks  For vitals, nutrition, and medication    Sydni also stated that pt has a small wound on coccyx. Some redness and tearing. Sydni would like to know what provider recommends for this. Dressing? Barrier cream or both? Sydni can order if given verbal orders.      Confirmed ok to leave a detailed message with call back.  Contact information confirmed and updated as needed.    Rj Lu, LATISHA Troy, from [facility].  [phone]

## 2023-10-20 NOTE — TELEPHONE ENCOUNTER
Is there discharge from the wound site? RN states no discharge, dry crease. The area is not red. Its like a small dry cut in the skin, no fever.     Skilled nursing approved for vitals nutrition and medication.

## 2023-10-20 NOTE — TELEPHONE ENCOUNTER
Wang daily the barrier cream should be applied if the area becomes extremely tender he needs to be seen in clinic.  Is he complaining of pain at the site.?

## 2023-10-26 ENCOUNTER — MEDICAL CORRESPONDENCE (OUTPATIENT)
Dept: HEALTH INFORMATION MANAGEMENT | Facility: CLINIC | Age: 53
End: 2023-10-26
Payer: COMMERCIAL

## 2023-10-26 ENCOUNTER — TELEPHONE (OUTPATIENT)
Dept: FAMILY MEDICINE | Facility: CLINIC | Age: 53
End: 2023-10-26
Payer: COMMERCIAL

## 2023-10-26 NOTE — TELEPHONE ENCOUNTER
Reason for Call:  Appointment Request    Patient requesting this type of appt:  Patient needs to get a injection for his bipolar (invega).    Requested provider: Telly Arias    Reason patient unable to be scheduled:  Patient does not have an order for this and will need one for scheduling.    When does patient want to be seen/preferred time:  Injection needs to be done on Tuesday (30 days from the last shot)     Comments: Please send out order for patient so that he can schedule.    Okay to leave a detailed message?: Yes at Other phone number:  [phone]    Call taken on 10/26/2023 at 8:09 AM by Jada Falcon

## 2023-10-26 NOTE — TELEPHONE ENCOUNTER
Please remind the patient that he got an Invega shot when he was here and saw Dr. Pugh on 10/19/2023.  Please schedule him to see me around 11/19/2023-okay to use a reserved spot, and we will get him his next Invega injection at that time.

## 2023-10-27 ENCOUNTER — MEDICAL CORRESPONDENCE (OUTPATIENT)
Dept: HEALTH INFORMATION MANAGEMENT | Facility: CLINIC | Age: 53
End: 2023-10-27
Payer: COMMERCIAL

## 2023-10-27 NOTE — TELEPHONE ENCOUNTER
Lvm x 1- Please see message below from Dr. Arias and schedule accordingly.    Thank you,  Luz Maria Wu

## 2023-10-30 ENCOUNTER — TELEPHONE (OUTPATIENT)
Dept: FAMILY MEDICINE | Facility: CLINIC | Age: 53
End: 2023-10-30
Payer: COMMERCIAL

## 2023-10-30 NOTE — TELEPHONE ENCOUNTER
Reason for Call:  Home Health Care    Celena with [facility] called regarding (reason for call): Verbal Order    Orders are needed for this patient. OT    OT: 1 visit every other week x 3 visits.  The visits will be to work on upper body strengthening, ADLs, and fine motor coordination.     Pt Provider: Telly Arias MD    Phone Number Homecare Nurse can be reached at: [phone]     Can we leave a detailed message on this number? YES     Will route encounter to Dr. Arias for review and verbal order.      Call taken on 10/30/2023 at 9:46 AM by Figueroa Barron RN

## 2023-10-31 ENCOUNTER — TELEPHONE (OUTPATIENT)
Dept: FAMILY MEDICINE | Facility: CLINIC | Age: 53
End: 2023-10-31
Payer: COMMERCIAL

## 2023-10-31 NOTE — TELEPHONE ENCOUNTER
Reason for Call:  Home Health Care    Carly with [facility] called regarding (reason for call): ST    Orders are needed for this patient. Speech Therapy     ST: Every other week for 6 weeks to address diet texture and swallow strategy.      Pt Provider: Juana Varner MD    Phone Number Homecare Nurse can be reached at: [phone]    Can we leave a detailed message on this number? YES         Call taken on 10/31/2023 at 10:19 AM by Figueroa Barron RN

## 2023-11-03 DIAGNOSIS — I63.511 ACUTE RIGHT MCA STROKE (H): ICD-10-CM

## 2023-11-03 RX ORDER — ATORVASTATIN CALCIUM 80 MG/1
80 TABLET, FILM COATED ORAL EVERY EVENING
Qty: 90 TABLET | Refills: 0 | Status: SHIPPED | OUTPATIENT
Start: 2023-11-03 | End: 2024-01-17

## 2023-11-08 ENCOUNTER — MEDICAL CORRESPONDENCE (OUTPATIENT)
Dept: HEALTH INFORMATION MANAGEMENT | Facility: CLINIC | Age: 53
End: 2023-11-08
Payer: COMMERCIAL

## 2023-11-08 ENCOUNTER — TELEPHONE (OUTPATIENT)
Dept: GASTROENTEROLOGY | Facility: CLINIC | Age: 53
End: 2023-11-08
Payer: COMMERCIAL

## 2023-11-08 NOTE — TELEPHONE ENCOUNTER
Caller: Tripp  Reason for Reschedule/Cancellation (please be detailed, any staff messages or encounters to note?): Wayne out      Prior to reschedule please review:  Ordering Provider: STEVE KEN   Sedation per order: CS  Does patient have any ASC Exclusions, please identify?: no      Notes on Cancelled Procedure:  Procedure: Lower Endoscopy [Colonoscopy]   Date: 12/26  Location: [facility]; [address] [place] [address], [address], [place] [address]  Surgeon: Wayne      Rescheduled: No    LVM and sent message    Case in depot

## 2023-11-10 ENCOUNTER — HOSPITAL ENCOUNTER (OUTPATIENT)
Facility: AMBULATORY SURGERY CENTER | Age: 53
End: 2023-11-10
Attending: SURGERY
Payer: COMMERCIAL

## 2023-11-13 ENCOUNTER — TELEPHONE (OUTPATIENT)
Dept: GASTROENTEROLOGY | Facility: CLINIC | Age: 53
End: 2023-11-13
Payer: COMMERCIAL

## 2023-11-17 ENCOUNTER — TELEPHONE (OUTPATIENT)
Dept: FAMILY MEDICINE | Facility: CLINIC | Age: 53
End: 2023-11-17

## 2023-11-17 ENCOUNTER — APPOINTMENT (OUTPATIENT)
Dept: RADIOLOGY | Facility: HOSPITAL | Age: 53
End: 2023-11-17
Attending: EMERGENCY MEDICINE
Payer: COMMERCIAL

## 2023-11-17 ENCOUNTER — HOSPITAL ENCOUNTER (EMERGENCY)
Facility: HOSPITAL | Age: 53
Discharge: HOME | End: 2023-11-17
Attending: EMERGENCY MEDICINE | Admitting: EMERGENCY MEDICINE
Payer: COMMERCIAL

## 2023-11-17 VITALS
OXYGEN SATURATION: 98 % | RESPIRATION RATE: 20 BRPM | WEIGHT: 145 LBS | TEMPERATURE: 97.8 F | HEART RATE: 97 BPM | SYSTOLIC BLOOD PRESSURE: 96 MMHG | BODY MASS INDEX: 19.64 KG/M2 | DIASTOLIC BLOOD PRESSURE: 59 MMHG | HEIGHT: 72 IN

## 2023-11-17 DIAGNOSIS — S62.102A WRIST FRACTURE, LEFT, CLOSED, INITIAL ENCOUNTER: ICD-10-CM
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
## 2023-11-17 PROCEDURE — 250N000013 HC RX MED GY IP 250 OP 250 PS 637: Performed by: EMERGENCY MEDICINE

## 2023-11-17 PROCEDURE — 25500 CLTX RDL SHFT FX W/O MNPJ: CPT | Mod: LT

## 2023-11-17 PROCEDURE — 73110 X-RAY EXAM OF WRIST: CPT | Mod: LT

## 2023-11-17 PROCEDURE — 73130 X-RAY EXAM OF HAND: CPT | Mod: LT

## 2023-11-17 PROCEDURE — 99284 EMERGENCY DEPT VISIT MOD MDM: CPT | Mod: 25

## 2023-11-17 RX ORDER — OXYCODONE AND ACETAMINOPHEN 5; 325 MG/1; MG/1
1 TABLET ORAL ONCE
Status: COMPLETED | OUTPATIENT
Start: 2023-11-17 | End: 2023-11-17

## 2023-11-17 RX ORDER — OXYCODONE AND ACETAMINOPHEN 5; 325 MG/1; MG/1
1 TABLET ORAL EVERY 6 HOURS PRN
Qty: 12 TABLET | Refills: 0 | Status: SHIPPED | OUTPATIENT
Start: 2023-11-17 | End: 2023-11-20

## 2023-11-17 RX ADMIN — OXYCODONE HYDROCHLORIDE AND ACETAMINOPHEN 1 TABLET: 5; 325 TABLET ORAL at 11:39

## 2023-11-17 ASSESSMENT — ENCOUNTER SYMPTOMS: ARTHRALGIAS: 1

## 2023-11-17 NOTE — ED PROVIDER NOTES
EMERGENCY DEPARTMENT ENCOUNTER      NAME: Tripp Sanders  AGE: 53 year old male  YOB: 1970  MRN: [id number]  EVALUATION DATE & TIME: No admission date for patient encounter.    PCP: Telly Arias    ED PROVIDER: Nuno Salinas M.D.      Chief Complaint   Patient presents with    Wrist Pain         FINAL IMPRESSION:  1.  Acute fall.  2.  Acute left wrist radial styloid fracture.    ED COURSE & MEDICAL DECISION MAKING:    10:09 AM I met with the patient to gather history and to perform my initial exam. We discussed plans for the ED course, including diagnostic testing and treatment. PPE worn: cloth mask.  Patient tripped and fell hitting his left hand on a cabinet now presents with left hand and wrist pain.  No other complaints at this time.  Blood pressure 90/52 but normal heart rate at 95.  No lightheadedness, chest pain, other symptoms.  11:20 AM Rechecked and updated the patient. Discussed imaging results.  Fiberglas volar splint placed by myself.  1 dose of Percocet given patient.  Patient discharged home with prescription for the same.  Patient in agreement with the plan.      Pertinent Labs & Imaging studies reviewed. (See chart for details)  53 year old male presents to the Emergency Department for evaluation of left hand and wrist pain after fall.    At the conclusion of the encounter I discussed the results of all of the tests and the disposition. The questions were answered. The patient or family acknowledged understanding and was agreeable with the care plan.              Medical Decision Making    History:  Supplemental history from: N/A  External Record(s) reviewed: Both inpatient and outpatient [facility] records were reviewed.    Work Up:  Chart documentation includes differential considered and any EKGs or imaging independently interpreted by provider, where specified.  Differential diagnosis includes fracture, dislocation, contusion, etc.  In additional to work up documented, I  considered the following work up: Documented in chart, if applicable.    External consultation:  Discussion of management with another provider: Documented in chart, if applicable    Complicating factors:  Care impacted by chronic illness: Cerebrovascular Disease  Care affected by social determinants of health: N/A    Disposition considerations: Dissipate discharge home after evaluation.          MEDICATIONS GIVEN IN THE EMERGENCY:  Medications - No data to display    NEW PRESCRIPTIONS STARTED AT TODAY'S ER VISIT  New Prescriptions    No medications on file          =================================================================    HPI    Patient information was obtained from: Patient    Use of : N/A      Tripp Sanders is a 53 year old male with a pertinent history of left hemiplegia following a right MCA stroke and alcohol and drug abuse who presents to this ED by wheelchair for evaluation of wrist pain.    The patient reports tripping and falling last night. When he fell he hit his left hand and wrist on a cabinet. He had immediate onset of pain which has continued into today. He did not sustain any other injuries. He is right-handed.    He does not identify any waxing or waning symptoms otherwise, exacerbating or alleviating features, associated symptoms except as mentioned.    REVIEW OF SYSTEMS   Review of Systems   Musculoskeletal:  Positive for arthralgias (left hand/wrist).   All other systems reviewed and are negative.      PAST MEDICAL HISTORY:  History reviewed. No pertinent past medical history.    PAST SURGICAL HISTORY:  History reviewed. No pertinent surgical history.        CURRENT MEDICATIONS:    atorvastatin (LIPITOR) 80 MG tablet  divalproex sodium extended-release (DEPAKOTE ER) 500 MG 24 hr tablet  propranolol ER (INDERAL LA) 60 MG 24 hr capsule  QUEtiapine (SEROQUEL) 200 MG tablet  QUEtiapine (SEROQUEL) 300 MG tablet  reason aspirin not prescribed, intentional,  reason aspirin not  prescribed, intentional,        ALLERGIES:  Allergies   Allergen Reactions    Trazodone Unknown     priapism       FAMILY HISTORY:  History reviewed. No pertinent family history.    SOCIAL HISTORY:   Social History     Socioeconomic History    Marital status: Single     Spouse name: None    Number of children: None    Years of education: None    Highest education level: None   Tobacco Use    Smoking status: Every Day     Types: Cigarettes, Other    Smokeless tobacco: Never    Tobacco comments:     no passive exposure, smokes marijuana.   Vaping Use    Vaping Use: Never used    Passive vaping exposure: Yes   Substance and Sexual Activity    Alcohol use: Not Currently     Alcohol/week: 17.0 standard drinks of alcohol     Comment: Alcoholic Drinks/day: Quit 11/15/2019    Drug use: Yes     Types: Marijuana     Comment: Drug use: To help with sleep. Take 3 times a week    Sexual activity: Not Currently     Partners: Female     Social Determinants of Health     Financial Resource Strain: Low Risk  (10/19/2023)    Financial Resource Strain     Within the past 12 months, have you or your family members you live with been unable to get utilities (heat, electricity) when it was really needed?: No   Food Insecurity: High Risk (10/19/2023)    Food Insecurity     Within the past 12 months, did you worry that your food would run out before you got money to buy more?: Yes     Within the past 12 months, did the food you bought just not last and you didn t have money to get more?: Yes   Transportation Needs: High Risk (10/19/2023)    Transportation Needs     Within the past 12 months, has lack of transportation kept you from medical appointments, getting your medicines, non-medical meetings or appointments, work, or from getting things that you need?: Yes   Housing Stability: High Risk (10/19/2023)    Housing Stability     Do you have housing? : No     Are you worried about losing your housing?: Yes   Uses tobacco.  History of  alcoholism.  History of marijuana use.    VITALS:  BP 90/52   Pulse 95   Temp 97.8  F (36.6  C) (Oral)   Resp 20   Ht 1.829 m (6')   Wt 65.8 kg (145 lb)   SpO2 97%   BMI 19.67 kg/m      PHYSICAL EXAM    Vital Signs:  BP 90/52   Pulse 95   Temp 97.8  F (36.6  C) (Oral)   Resp 20   Ht 1.829 m (6')   Wt 65.8 kg (145 lb)   SpO2 97%   BMI 19.67 kg/m    General:  On entering the room he is in no apparent distress.    Neck:  Neck supple with full range of motion and nontender.    Back:  Back and spine are nontender.  No costovertebral angle tenderness.    HEENT:  Oropharynx clear with moist mucous membranes.  HEENT unremarkable.    Pulmonary:  Chest clear to auscultation without rhonchi rales or wheezing.    Cardiovascular:  Cardiac regular rate and rhythm without murmurs rubs or gallops.    Abdomen:  Abdomen soft nontender.  There is no rebound or guarding.    Muskuloskeletal:  He moves all 4 without any difficulty and has normal neurovascular exams.  Extremities without clubbing, cyanosis, or edema.  Legs and calves are nontender.  Full range of motion of the left wrist and hand.  Good pulse cap refill.  Sensation intact soft touch.  Tender over the wrist and dorsal aspect of the hand.  No visible deformity.  Neuro:  He is alert and oriented ×3 and moves all extremities symmetrically.    Psych:  Normal affect.    Skin:  Unremarkable and warm and dry.       LAB:  All pertinent labs reviewed and interpreted.  Labs Ordered and Resulted from Time of ED Arrival to Time of ED Departure - No data to display    RADIOLOGY:  Reviewed all pertinent imaging. Please see official radiology report.  XR Hand Left G/E 3 Views   Final Result   IMPRESSION:    1.  Nondisplaced intra-articular fracture of the left radial styloid.   2.  No joint malalignment.   3.  Moderate bone demineralization.       XR Wrist Left G/E 3 Views   Final Result   IMPRESSION:    1.  Nondisplaced intra-articular fracture of the left radial styloid.    2.  No joint malalignment.   3.  Moderate bone demineralization.                  EKG:      PROCEDURES:         I, Nick Dey, am serving as a scribe to document services personally performed by Dr. Salinas based on my observation and the provider's statements to me. I, Nuno Salinas MD attest that Nick Dey is acting in a scribe capacity, has observed my performance of the services and has documented them in accordance with my direction.    Nuno Salinas M.D.  Emergency Medicine  [facility] EMERGENCY DEPARTMENT  [address] [place] [id number] [phone]  Dept: [phone]       Nuno Salinas MD  11/17/23 5419

## 2023-11-17 NOTE — ED TRIAGE NOTES
W/c to triage.  Pt with hx of stroke and L arm deficit.  Reprots tripped last night and hit L hand on cabinet.  C/o pain to L hand and wrist.  Denies any other pain.  Pt staes he would also like to be checked for pneumonia.  Pt reprots tried tylenol this morning about 0800 without relief.      Triage Assessment (Adult)       Row Name 11/17/23 0951          Triage Assessment    Airway WDL WDL        Respiratory WDL    Respiratory WDL WDL        Skin Circulation/Temperature WDL    Skin Circulation/Temperature WDL WDL        Cardiac WDL    Cardiac WDL WDL        Peripheral/Neurovascular WDL    Peripheral Neurovascular WDL WDL        Cognitive/Neuro/Behavioral WDL    Cognitive/Neuro/Behavioral WDL WDL

## 2023-11-17 NOTE — DISCHARGE INSTRUCTIONS
Keep the splint on.  Keep it clean and dry.  Ice off-and-on to the wrist whenever hurting for the next several days.  Over-the-counter Tylenol or ibuprofen as tolerated every 6 hours as needed for pain.  1 Percocet every 6 hours if needed for stronger pain.  Do not drive with this.  Have someone drive you home today.  You should see your clinic doctor for follow-up within the next week.

## 2023-11-17 NOTE — TELEPHONE ENCOUNTER
Celena OT from [facility] calling to report that Tripp had a fall yesterday and hurt his left wrist and arm and they swelled up a bit so family is taking Tripp to [facility]. Just an update for Dr. Arias.

## 2023-11-17 NOTE — TELEPHONE ENCOUNTER
Patient called to schedule a hospital f/up due to fractured left wrist.     LLUVIA BradleyN RN  [facility]

## 2023-11-20 ENCOUNTER — OFFICE VISIT (OUTPATIENT)
Dept: FAMILY MEDICINE | Facility: CLINIC | Age: 53
End: 2023-11-20
Payer: COMMERCIAL

## 2023-11-20 VITALS
DIASTOLIC BLOOD PRESSURE: 70 MMHG | SYSTOLIC BLOOD PRESSURE: 130 MMHG | WEIGHT: 150 LBS | HEIGHT: 72 IN | HEART RATE: 90 BPM | BODY MASS INDEX: 20.32 KG/M2 | RESPIRATION RATE: 14 BRPM | TEMPERATURE: 99 F | OXYGEN SATURATION: 99 %

## 2023-11-20 DIAGNOSIS — F12.10 MARIJUANA ABUSE: ICD-10-CM

## 2023-11-20 DIAGNOSIS — S52.515D CLOSED NONDISPLACED FRACTURE OF STYLOID PROCESS OF LEFT RADIUS WITH ROUTINE HEALING, SUBSEQUENT ENCOUNTER: ICD-10-CM

## 2023-11-20 DIAGNOSIS — F10.10 ALCOHOL ABUSE: ICD-10-CM

## 2023-11-20 DIAGNOSIS — G81.94 LEFT HEMIPLEGIA (H): ICD-10-CM

## 2023-11-20 DIAGNOSIS — F25.0 SCHIZOAFFECTIVE DISORDER, BIPOLAR TYPE (H): Primary | ICD-10-CM

## 2023-11-20 DIAGNOSIS — R13.10 DYSPHAGIA, UNSPECIFIED TYPE: ICD-10-CM

## 2023-11-20 PROCEDURE — 96372 THER/PROPH/DIAG INJ SC/IM: CPT | Performed by: FAMILY MEDICINE

## 2023-11-20 PROCEDURE — 99215 OFFICE O/P EST HI 40 MIN: CPT | Mod: 25 | Performed by: FAMILY MEDICINE

## 2023-11-20 NOTE — PROGRESS NOTES
ASSESMENT AND PLAN:  Diagnoses and all orders for this visit:  Schizoaffective disorder, bipolar type (H)  Invega injection given today in clinic.  He will follow-up monthly to continue to get his Invega injection.  Extensive counseling and coordination of care with the patient today, unfortunately his usual caregiver-his cousin-is not with him today.  Patient's had multiple recent complications including a hospitalization, now with a fall with a fracture.  Please see previous clinic note from Dr. Pugh as well, I have discussed the case with her.  I think the patient needs to be in a different level of care living environment, potentially long-term care with nursing.  I did  the patient about this today, he wants to try to stay living with his cousin for now.  I would like our care coordination team to get involved to try to help facilitate a medium term transition to a long-term care facility if patient becomes agreeable, there had also been concerns about the ability of his cousin to continue to take care of him at this level of need.  -     Primary Care - Care Coordination Referral; Future  Left hemiplegia (H)  Stable.  -     Primary Care - Care Coordination Referral; Future  Dysphagia, unspecified type  With recent hospitalization for aspiration pneumonia.  Counseling done with the patient that this is again the reason that I would recommend long-term care with nursing services available that would include specialized diet, speech therapy, etc.  Patient declines that for now.  Marijuana abuse  Ongoing.  Patient counseled on the importance of cessation.  Alcohol abuse  In early remission, patient reports he has not used alcohol in the last few weeks.  Patient counseled on the extreme importance of avoiding alcohol given it is a main  in exacerbation of his medical problems.  It also makes it impossible to care for him at home.  Closed nondisplaced fracture of styloid process of left radius with  routine healing, subsequent encounter  Reviewed the x-ray findings and ER notes with the patient and the caregiver that is with him today.  Patient needs to be seen at [facility] for definitive care.  Splint is doing its job for now and intact neurovascular function distally as detailed below on exam.  There are significant transportation barriers and other barriers as detailed above, I would like our care coordination team to help and make sure that he does get in with [facility], I am hoping he gets up there later this afternoon at their walk-in care.  I gave the patient and his caregiver a printout with the address, name, phone number of the [facility] urgent care where he can follow-up on his fracture for ongoing management.  -     Primary Care - Care Coordination Referral; Future      Reviewed the risks and benefits of the treatment plan with the patient and/or caregiver and we discussed indications for routine and emergent follow-up.    43 minutes spent on the date of the encounter doing chart review, patient visit and documentation and face to face counseling on above.      SUBJECTIVE: 53-year-old male in for follow-up on a recent emergency room visit where he was diagnosed with a left radial styloid fracture with intra-articular involvement after recent fall.  Patient's had a difficult last several months.  He has a history of stroke and has some left hemiplegia that has slowly been improving but he is still disabled from it.  He also has a history of severe schizoaffective disorder, has been well managed on Invega injections as long as he takes them.  Also well managed as long as he does not use marijuana and alcohol.  Patient has been using marijuana regularly but has not used alcohol again since his hospital visit.  He is in a very difficult living situation, living with a cousin, cousin overwhelmed by the increased level of needs that the patient has and expressed concern about  inability to be able to manage this any longer at home-see previous clinic note for details.  Patient very much wants to stay there.  He does not want to go into a nursing facility at this point.    No past medical history on file.  Patient Active Problem List   Diagnosis    Bipolar affective disorder (H)    Primary insomnia    Alcohol abuse    Anxiety    Schizoaffective disorder, bipolar type (H)    Sinus tachycardia    Hypertriglyceridemia    Erectile dysfunction, unspecified erectile dysfunction type    Drug abuse (H)    Acute right MCA stroke (H)    Left hemiplegia (H)    Marijuana abuse    Alcohol dependence, uncomplicated (H)    Acute respiratory failure with hypoxia and hypercapnia (H)    Aspiration pneumonia, unspecified aspiration pneumonia type, unspecified laterality, unspecified part of lung (H)    Dysphagia, unspecified type     Current Outpatient Medications   Medication Sig Dispense Refill    atorvastatin (LIPITOR) 80 MG tablet Take 1 tablet (80 mg) by mouth every evening 90 tablet 0    divalproex sodium extended-release (DEPAKOTE ER) 500 MG 24 hr tablet Take 500 mg by mouth At Bedtime      oxyCODONE-acetaminophen (PERCOCET) 5-325 MG tablet Take 1 tablet by mouth every 6 hours as needed for pain 12 tablet 0    propranolol ER (INDERAL LA) 60 MG 24 hr capsule Take 1 capsule (60 mg) by mouth daily 90 capsule 3    QUEtiapine (SEROQUEL) 200 MG tablet [QUETIAPINE (SEROQUEL) 200 MG TABLET] Take 200 mg by mouth at bedtime. Take with 300 mg tablet for total of 500 mg at bedtime      QUEtiapine (SEROQUEL) 300 MG tablet [QUETIAPINE (SEROQUEL) 300 MG TABLET] Take 300 mg by mouth at bedtime. Take with 200 mg for total of 500 mg nightly      reason aspirin not prescribed, intentional, Please choose reason not prescribed from choices below.      reason aspirin not prescribed, intentional, Please choose reason not prescribed from choices below.       History   Smoking Status    Former    Types: Cigarettes, Other  "  Smokeless Tobacco    Never       OBJECTICE: /70   Pulse 90   Temp 99  F (37.2  C) (Oral)   Resp 14   Ht 1.829 m (6' 0.01\")   Wt 68 kg (150 lb)   SpO2 99%   BMI 20.34 kg/m       No results found for this or any previous visit (from the past 24 hour(s)).     GEN-alert, appropriately communicative, no acute distress   Musculoskeletal-left arm in a splint that is well applied.   Neurovascular-he is able to wiggle his fingers and there is good warmth and capillary refill on the fingertips distal to the fracture.    Telly Arias MD   "

## 2023-11-20 NOTE — COMMUNITY RESOURCES LIST (ENGLISH)
11/20/2023   [facility]  N/A  For questions about this resource list or additional care needs, please contact your primary care clinic or care manager.  Phone: [phone]   Email: N/A   Address: [address] [place] [id number]   Hours: N/A        Financial Stability       Rent and mortgage payment assistance  1  [facility] ([facility]) - [place] Office - Supportive Housing Assistance Program (SHAP) - Rent and mortgage payment assistance Distance: 1.78 miles      Phone/Virtual   [address] [place] [address]  Language: English, Hmong, Raiza, Uruguayan  Hours: Mon - Fri 8:30 AM - 5:00 PM  Fees: Free   Phone: (430) 139-3489 Email: sonal@[employer] Website: http://www.[employer].org/[facility]-impact-areas/     2  [facility] Distance: 2.1 miles      Phone/Virtual   [address] [place] [address]  Language: English  Hours: Mon - Fri 9:00 AM - 4:00 PM  Fees: Free   Phone: (397) 280-1842 Email: fidelina@[facility].org Website: http://[facility].org/[facility]/     Utility payment assistance  3  [facility] Distance: 1.66 miles      Phone/Virtual   [address] [place] [address]  Language: English, Egyptian  Hours: Mon - Thu 8:00 AM - 4:00 PM , Fri 8:00 AM - 12:00 PM  Fees: Free   Phone: (629) 997-7953 Email: ecc@[employer].org Website: http://[employer].org/     4  [facility] ([facility]) - [place] Office - Supportive Housing Assistance Program (SHAP) - Utility payment assistance Distance: 1.78 miles      Phone/Virtual   [address] [place] [id number]  Language: English, Hmong, Raiza, Uruguayan  Hours: Mon - Fri 8:30 AM - 5:00 PM  Fees: Free   Phone: (184) 928-5847 Email: sonal@[employer] Website: http://www.hmong.org/hap-impact-areas/          Food and Nutrition       Food pantry  5  [facility] - Food Distribution Program  Distance: 1.16 miles      In-Person   [address] [place] [address]  Language: English, Hmong, Togolese  Hours: Tue 3:00 PM - 5:00 PM  Fees: Free   Phone: (957) 656-3191 Email: info@[facility].[employer] Website: http://[facility].org/programs/enqbkz-demnsijbf-qnyplw/     6  [facility] Distance: 1.76 miles      [address] [address]  Language: American Sign Language, English, Hmong, Faroese, Togolese  Hours: Mon - Fri 12:00 PM - 1:00 PM  Fees: Free   Phone: (131) 610-7188 Email: info@[employer] Website: https://www.[facility].[employer]/locations/[facility]_ymca?utm_source=GEOCOMtms&utm_medium=local&utm_campaign=local%20search     SNAP application assistance  7  [facility] [place] Distance: 2.1 miles      Phone/Virtual   [address] [place] [address]  Language: English  Hours: Mon - Thu 9:00 AM - 4:00 PM , Fri 9:00 AM - 2:00 PM , Sun 10:00 AM - 12:00 PM  Fees: Free   Phone: (851) 194-2335 Email: fidelina@[facility].org Website: http://[facility].org/[facility]/     8  [place] Department of Human Services - Gem[employer] (SNAP) Distance: 2.91 miles      Phone/Virtual   [address] [address] [place] [address]  Language: English, Hmong, Citizen of Guinea-Biss[place], Faroese, Togolese, Mohawk  Hours: Mon - Fri 9:00 AM - 5:00 PM  Fees: Free   Phone: (267) 120-5089 Website: https://[place].gov/dhs/people-we-serve/adults/economic-assistance/food-nutrition/programs-and-services/supplemental-nutrition-assistance-program.jsp     Soup kitchen or free meals  9  [facility] - Loaves [employer] Fishes - Loaves and Fishes Distance: 1.95 miles      Pickup   [address] [place] [address]  Language: English  Hours: Wed 5:30 PM - 6:30 PM  Fees: Free   Phone: (637) 722-2165 Email: office@[facility].org Website: https://www.chuyandfishesmn.org     10  [facility]  Distance: 2.1 miles      [facility]   [address] [place] [address]  Language: English  Hours: Mon - Fri 11:45 AM - 12:45 PM  Fees: Free   Phone: (753) 126-3649 Email: fidelina@[facility].org Website: http://[facility].org/[facility]/np-smou-cxooc-ave/          Transportation       Free or low-cost transportation  11  [facility] Bus Distance: 5.16 miles      In-Person   [address] [place] [address]  Language: English  Hours: Tue 9:00 AM - 2:00 PM  Fees: Self Pay   Phone: (985) 145-6713 Email: info@[employer] Website: http://www.[employer].org/     12  [employer] [facility] Bus Loop - Free or low-cost transportation Distance: 6.15 miles      In-Person   [address] [facility] [address] [place] [address]  Language: English  Hours: Mon - Fri 9:00 AM - 5:00 PM  Fees: Free   Phone: (821) 758-6527 Email: info@[employer]ally Website: https://www.Locally/     Transportation to medical appointments  13  [employer] - Non-Emergency Medical Transportation Distance: 4 miles      In-Person   [address] [place] [address]  Language: English  Hours: Mon - Fri 8:00 AM - 4:00 PM Appt. Only  Fees: Self Pay   Phone: (823) 884-6111 Website: http://www.[facility].org/Medical-Services/Emergency-medical-services/Non-emergency-transportation/     14  [employer] Ride Distance: 4.88 miles      In-Person   [address] [address]  Language: English  Hours: Mon - Thu 6:00 AM - 6:00 PM , Fri 6:00 AM - 5:00 PM  Fees: Insurance, Self Pay   Phone: (291) 341-9350 Email: office@[employer] Website: https://www.[employer]/          Important Numbers & Websites       Emergency Services   911  City Services   311  Poison Control   (474) 187-9380  Suicide Prevention Lifeline   (165) 320-4525 (TALK)  Child Abuse Hotline   (557) 616-1358 (4-A-Child)  Sexual Assault Hotline   (121) 569-6563 (HOPE)  [facility] Safeline   (389) 485-1778  (RUNAWAY)  All-Options Talkline   (178) 703-4658  Substance Abuse Referral   (695) 227-6376 (HELP)

## 2023-11-22 ENCOUNTER — PATIENT OUTREACH (OUTPATIENT)
Dept: CARE COORDINATION | Facility: CLINIC | Age: 53
End: 2023-11-22
Payer: COMMERCIAL

## 2023-11-22 ENCOUNTER — MEDICAL CORRESPONDENCE (OUTPATIENT)
Dept: HEALTH INFORMATION MANAGEMENT | Facility: CLINIC | Age: 53
End: 2023-11-22

## 2023-11-22 DIAGNOSIS — Z53.9 DIAGNOSIS NOT YET DEFINED: Primary | ICD-10-CM

## 2023-11-22 PROCEDURE — G0180 MD CERTIFICATION HHA PATIENT: HCPCS | Performed by: FAMILY MEDICINE

## 2023-11-22 NOTE — PROGRESS NOTES
Clinic Care Coordination Contact  [facility]/Voicemail    Clinical Data: Care Coordinator Outreach    Outreach Documentation Number of Outreach Attempt   11/22/2023  10:10 AM 1     CHW called patient at [phone] x2. Rings twice, then busy signal. Unable to leave voicemail.    Chart Review: Referral from PCP  Reason for Referral: Complex Medical Concerns/Education  Complex Medical Concerns: See 11/20/23 clinic note.    Plan: Care Coordinator will try to reach patient again in 1-2 business days.    Tabitha Patton  Clinic Care Coordination  [facility]    Phone: [phone]

## 2023-11-22 NOTE — LETTER
[facility] CARE COORDINATION  [address]  [place] [address]    November 29, 2023    Tripp Sanders  [address]  [place] [address]      Dear Tripp,    I am a clinic community health worker who works with Telly Arias MD with the [facility]. I have been trying to reach you recently to introduce Clinic Care Coordination. Below is a description of clinic care coordination and how I can further assist you.       The clinic care coordination team is made up of a registered nurse, , financial resource worker and community health worker who understand the health care system. The goal of clinic care coordination is to help you manage your health and improve access to the health care system. Our team works alongside your provider to assist you in determining your health and social needs. We can help you obtain health care and community resources, providing you with necessary information and education. We can work with you through any barriers and develop a care plan that helps coordinate and strengthen the communication between you and your care team.  Our services are voluntary and are offered without charge to you personally.    Please feel free to contact me with any questions or concerns regarding care coordination and what we can offer.      We are focused on providing you with the highest-quality healthcare experience possible.    Sincerely,     Tabitha Patton  Clinic Care Coordination  [facility]    Phone: [phone]

## 2023-11-22 NOTE — Clinical Note
Tay Arias,  Thank you for your CCC referral. I was unable to reach patient and will mail care coordination introduction letter. Please place new referral as needed, thank you.   HUNG [place]

## 2023-11-24 NOTE — PROGRESS NOTES
Clinic Care Coordination Contact  [facility]/Voicemail    Clinical Data: Care Coordinator Outreach    Outreach Documentation Number of Outreach Attempt   11/22/2023  10:10 AM 1   11/24/2023   2:01 PM 2     Left message on patient's voicemail with call back information and requested return call.    Chart Review: Referral from PCP  Reason for Referral: Complex Medical Concerns/Education  Complex Medical Concerns: See 11/20/23 clinic note.    Plan: Care Coordinator will send care coordination introduction letter with care coordinator contact information and explanation of care coordination services via mail. Care Coordinator will do no further outreaches at this time.    Tabitha Patton  [facility] Care Coordination  [facility]    Phone: [phone]

## 2023-11-27 ENCOUNTER — TELEPHONE (OUTPATIENT)
Dept: FAMILY MEDICINE | Facility: CLINIC | Age: 53
End: 2023-11-27
Payer: COMMERCIAL

## 2023-11-27 NOTE — TELEPHONE ENCOUNTER
Shannon padron agent from [facility] is calling to report a fall done the 11/16 and resulted in a left wrist fracture. Did go to ER and is now on a cast as well.

## 2023-12-04 ENCOUNTER — TELEPHONE (OUTPATIENT)
Dept: FAMILY MEDICINE | Facility: CLINIC | Age: 53
End: 2023-12-04
Payer: COMMERCIAL

## 2023-12-04 NOTE — TELEPHONE ENCOUNTER
On 11/20/23 they faxed over a form needed for home care x3.  Home care hs not received a response that the forms were even received.  Home Care will refax today and they at least need confirmation that forms will be completed and if not by Dr Arias, who should they be faxing to as patient was seen within the past month and Dr Arias is listed as PCP.  Camila Rivas RN  [facility]

## 2023-12-05 NOTE — TELEPHONE ENCOUNTER
Document already returned via fax. Unable to locate document in clinic as it must have been sent to scanning.

## 2023-12-05 NOTE — TELEPHONE ENCOUNTER
Nothing is in the current blue folder, I think I did these papers last week in the blue folder's last week that were done on Thursday.  Thanks.

## 2023-12-11 ENCOUNTER — TELEPHONE (OUTPATIENT)
Dept: FAMILY MEDICINE | Facility: CLINIC | Age: 53
End: 2023-12-11
Payer: COMMERCIAL

## 2023-12-11 NOTE — TELEPHONE ENCOUNTER
Home Care is calling regarding an established patient with [facility].       Requesting orders from: Telly Arias  Provider is following patient: Yes  Is this a 60-day recertification request?  Yes    Orders Requested    Physical Therapy  Request for recertification   Frequency: once per week every other week for 2 weeks.  They also wanted skilled nursing eval and treat and social work eval and treat.      Confirmed ok to leave a detailed message with call back.  Contact information confirmed and updated as needed.    When leaving a VM with VERBAL ORDERS, please leave full name and credentials.    Jonah Elam, RN-[facility]

## 2023-12-12 ENCOUNTER — MEDICAL CORRESPONDENCE (OUTPATIENT)
Dept: HEALTH INFORMATION MANAGEMENT | Facility: CLINIC | Age: 53
End: 2023-12-12
Payer: COMMERCIAL

## 2023-12-12 DIAGNOSIS — Z53.9 DIAGNOSIS NOT YET DEFINED: Primary | ICD-10-CM

## 2023-12-12 PROCEDURE — G0180 MD CERTIFICATION HHA PATIENT: HCPCS | Performed by: FAMILY MEDICINE

## 2023-12-21 ENCOUNTER — MEDICAL CORRESPONDENCE (OUTPATIENT)
Dept: HEALTH INFORMATION MANAGEMENT | Facility: CLINIC | Age: 53
End: 2023-12-21
Payer: COMMERCIAL

## 2023-12-27 ENCOUNTER — TELEPHONE (OUTPATIENT)
Dept: FAMILY MEDICINE | Facility: CLINIC | Age: 53
End: 2023-12-27

## 2023-12-27 NOTE — TELEPHONE ENCOUNTER
Pedro Luis from [facility] calling to clarify medications. She went to assessed pt yesterday at his home and noticed that pt was using marijuana. Pedro Luis wanted to clarify if pt is on medical marijuana. Per chart review, writer does not see information regarding medical marijuana. RN would like to report to PCP that pt is using marijuana. RN would also like PCP to know that pt had a fall in November and fracture his left hand. RN advised him to follow-up with ortho.    Rn encouraged him to set up an appt with PC to discuss medication. Pt has an appt today with PCP at 3:00 pm.    Rj Lu BSN RN  [facility]

## 2024-01-17 DIAGNOSIS — I63.511 ACUTE RIGHT MCA STROKE (H): ICD-10-CM

## 2024-01-17 RX ORDER — ATORVASTATIN CALCIUM 80 MG/1
80 TABLET, FILM COATED ORAL EVERY EVENING
Qty: 90 TABLET | Refills: 3 | Status: SHIPPED | OUTPATIENT
Start: 2024-01-17

## 2024-02-06 DIAGNOSIS — R00.0 SINUS TACHYCARDIA: ICD-10-CM

## 2024-02-06 DIAGNOSIS — F41.9 ANXIETY: ICD-10-CM

## 2024-02-07 RX ORDER — PROPRANOLOL HCL 60 MG
60 CAPSULE, EXTENDED RELEASE 24HR ORAL DAILY
Qty: 90 CAPSULE | Refills: 0 | Status: SHIPPED | OUTPATIENT
Start: 2024-02-07 | End: 2024-02-29

## 2024-02-27 ENCOUNTER — TELEPHONE (OUTPATIENT)
Dept: FAMILY MEDICINE | Facility: CLINIC | Age: 54
End: 2024-02-27
Payer: COMMERCIAL

## 2024-02-27 DIAGNOSIS — F41.9 ANXIETY: ICD-10-CM

## 2024-02-27 DIAGNOSIS — R00.0 SINUS TACHYCARDIA: ICD-10-CM

## 2024-02-27 NOTE — TELEPHONE ENCOUNTER
Medication Question or Refill    Contacts         Type Contact Phone/Fax    02/27/2024 11:39 AM CST Phone (Incoming) Marilyn Tripp KIT (Self) [phone] (M)            What medication are you calling about (include dose and sig)?: propranolol ER (INDERAL LA) 80 MG 24 hr capsule     Preferred Pharmacy:   [id number] - [place], [place] - [address]   [address]  [place] [address]  Phone: [phone] Fax: [phone]      Controlled Substance Agreement on file:   CSA -- Patient Level:    CSA: None found at the patient level.       Who prescribed the medication?: PCP    Do you need a refill? Yes    When did you use the medication last?     Patient offered an appointment? No    Do you have any questions or concerns?  Yes: [facility] system down. Please fax hard copy to pharmacy. Pt stated that he takes the 80 mg not the 60 mg. PCP please review.      Okay to leave a detailed message?: Yes at Home number on file [phone] (home)

## 2024-02-28 ENCOUNTER — TELEPHONE (OUTPATIENT)
Dept: FAMILY MEDICINE | Facility: CLINIC | Age: 54
End: 2024-02-28
Payer: COMMERCIAL

## 2024-02-28 DIAGNOSIS — F41.9 ANXIETY: Primary | ICD-10-CM

## 2024-02-28 DIAGNOSIS — R00.0 SINUS TACHYCARDIA: ICD-10-CM

## 2024-02-28 NOTE — TELEPHONE ENCOUNTER
Medication Question or Refill    Contacts         Type Contact Phone/Fax    02/28/2024 09:38 AM CST Phone (Incoming) Tripp Sanders (Self) [phone] (M)            What medication are you calling about (include dose and sig)?: propanalol 80 mg     Preferred Pharmacy:   [id number] - [place], [place] - [address]   [address] [place] [address]  Phone: [phone] Fax: [phone]      Controlled Substance Agreement on file:   CSA -- Patient Level:    CSA: None found at the patient level.       Who prescribed the medication?: pcp    Do you need a refill? Yes    When did you use the medication last? Pt is out, can't remember last day     Patient offered an appointment? No    Do you have any questions or concerns?  No      Okay to leave a detailed message?: Yes at Cell number on file:    Telephone Information:   Mobile [phone]

## 2024-02-29 RX ORDER — PROPRANOLOL HYDROCHLORIDE 80 MG/1
80 CAPSULE, EXTENDED RELEASE ORAL DAILY
Qty: 90 CAPSULE | Refills: 3 | Status: SHIPPED | OUTPATIENT
Start: 2024-02-29 | End: 2024-03-18

## 2024-02-29 NOTE — TELEPHONE ENCOUNTER
Patient has called both Lynsey Hwang and Drea lawn regarding this. See 2/28/24 TE for response.     Chana Jerry RN  [facility]

## 2024-02-29 NOTE — TELEPHONE ENCOUNTER
"Patient already has an appointment with  on 3/18/24 but informed patient that writer will send this to the provider for advisement.     Patient has an active prescription for 60 mg XR propranolol. It appears that this was picked up on 2/5/24 and that patient has been on 60 mg XR for awhile. Patient initially denies this dose at all. Then later reports that \"a TCU adjusted my dose but I want to take the dose that  gave me of 80 mg\".     Patient is not a great historian. His brother supposedly helps with his medications but nether could verify if patient has been taking any propranolol at all recently either the 60 or 80 mg. \"The 80 mg bottle is just empty. I want to get back on the original plan.\"    Chana Jerry RN  [facility]    "

## 2024-02-29 NOTE — TELEPHONE ENCOUNTER
Called and informed patient that the 80 mg XR propranolol was sent in to their pharmacy. Patient said thank you and then hung up on writer.     Chana Jerry RN  [facility]

## 2024-03-18 ENCOUNTER — OFFICE VISIT (OUTPATIENT)
Dept: FAMILY MEDICINE | Facility: CLINIC | Age: 54
End: 2024-03-18
Payer: COMMERCIAL

## 2024-03-18 VITALS
BODY MASS INDEX: 20.06 KG/M2 | RESPIRATION RATE: 16 BRPM | DIASTOLIC BLOOD PRESSURE: 53 MMHG | WEIGHT: 148.08 LBS | HEIGHT: 72 IN | HEART RATE: 78 BPM | TEMPERATURE: 98 F | SYSTOLIC BLOOD PRESSURE: 79 MMHG | OXYGEN SATURATION: 98 %

## 2024-03-18 DIAGNOSIS — Z23 NEED FOR VACCINATION: ICD-10-CM

## 2024-03-18 DIAGNOSIS — R00.0 SINUS TACHYCARDIA: ICD-10-CM

## 2024-03-18 DIAGNOSIS — N40.0 PROSTATE HYPERTROPHY: ICD-10-CM

## 2024-03-18 DIAGNOSIS — F41.9 ANXIETY: ICD-10-CM

## 2024-03-18 DIAGNOSIS — F19.10 DRUG ABUSE (H): ICD-10-CM

## 2024-03-18 DIAGNOSIS — K21.00 GASTROESOPHAGEAL REFLUX DISEASE WITH ESOPHAGITIS WITHOUT HEMORRHAGE: ICD-10-CM

## 2024-03-18 DIAGNOSIS — F10.20 ALCOHOL DEPENDENCE, UNCOMPLICATED (H): ICD-10-CM

## 2024-03-18 DIAGNOSIS — F25.0 SCHIZOAFFECTIVE DISORDER, BIPOLAR TYPE (H): Primary | ICD-10-CM

## 2024-03-18 DIAGNOSIS — G81.94 LEFT HEMIPLEGIA (H): ICD-10-CM

## 2024-03-18 PROBLEM — J96.01 ACUTE RESPIRATORY FAILURE WITH HYPOXIA AND HYPERCAPNIA (H): Status: RESOLVED | Noted: 2023-10-09 | Resolved: 2024-03-18

## 2024-03-18 PROBLEM — J96.02 ACUTE RESPIRATORY FAILURE WITH HYPOXIA AND HYPERCAPNIA (H): Status: RESOLVED | Noted: 2023-10-09 | Resolved: 2024-03-18

## 2024-03-18 PROCEDURE — 90686 IIV4 VACC NO PRSV 0.5 ML IM: CPT | Performed by: FAMILY MEDICINE

## 2024-03-18 PROCEDURE — 91320 SARSCV2 VAC 30MCG TRS-SUC IM: CPT | Performed by: FAMILY MEDICINE

## 2024-03-18 PROCEDURE — 99214 OFFICE O/P EST MOD 30 MIN: CPT | Mod: 25 | Performed by: FAMILY MEDICINE

## 2024-03-18 PROCEDURE — 90480 ADMN SARSCOV2 VAC 1/ONLY CMP: CPT | Performed by: FAMILY MEDICINE

## 2024-03-18 PROCEDURE — G0008 ADMIN INFLUENZA VIRUS VAC: HCPCS | Performed by: FAMILY MEDICINE

## 2024-03-18 RX ORDER — TAMSULOSIN HYDROCHLORIDE 0.4 MG/1
0.4 CAPSULE ORAL DAILY
Qty: 90 CAPSULE | Refills: 3 | Status: SHIPPED | OUTPATIENT
Start: 2024-03-18

## 2024-03-18 RX ORDER — PROPRANOLOL HCL 60 MG
60 CAPSULE, EXTENDED RELEASE 24HR ORAL DAILY
Qty: 90 CAPSULE | Refills: 3 | Status: SHIPPED | OUTPATIENT
Start: 2024-03-18 | End: 2024-05-30

## 2024-03-18 RX ORDER — OMEPRAZOLE 40 MG/1
40 CAPSULE, DELAYED RELEASE ORAL DAILY
Qty: 90 CAPSULE | Refills: 3 | Status: SHIPPED | OUTPATIENT
Start: 2024-03-18 | End: 2024-09-01 | Stop reason: ALTCHOICE

## 2024-03-18 NOTE — PROGRESS NOTES
ASSESMENT AND PLAN:  Diagnoses and all orders for this visit:  Schizoaffective disorder, bipolar type (H)  Patient counseled today, he does not want to restart Invega.  He has now been off of it for several months and has not had any recurrence of psychosis.  Drug abuse (H)  Continues to use marijuana but no other drugs currently.  Counseled on the importance of ongoing sobriety, the patient had exacerbation of psychosis and anxiety in the past with marijuana  Alcohol dependence, uncomplicated, in remission (H)  Congratulated on his ongoing sobriety.  Left hemiplegia (H)  Stable.  Secondary to stroke.  Gastroesophageal reflux disease with esophagitis without hemorrhage  Counseled on importance of taking his medication daily.  Follow-up if symptoms do not resolve with this treatment.  -     omeprazole (PRILOSEC) 40 MG DR capsule; Take 1 capsule (40 mg) by mouth daily  Prostate hypertrophy  He did have a normal PSA within the last year.  Restart tamsulosin which the patient had tolerated well in the past.  Follow-up if not improving.-     tamsulosin (FLOMAX) 0.4 MG capsule; Take 1 capsule (0.4 mg) by mouth daily  Need for vaccination  -     COVID-19 12+ (2023-24) (PFIZER)  Anxiety  -     propranolol ER (INDERAL LA) 60 MG 24 hr capsule; Take 1 capsule (60 mg) by mouth daily  Sinus tachycardia  Some hypotension on his blood pressure readings today in the clinic.  Asymptomatic.  Will reduce his dose of propranolol.  Encouraged the patient to have good oral hydration.  -     propranolol ER (INDERAL LA) 60 MG 24 hr capsule; Take 1 capsule (60 mg) by mouth daily  Other orders  -     INFLUENZA VACCINE >6 MONTHS (AFLURIA/FLUZONE)      Reviewed the risks and benefits of the treatment plan with the patient and/or caregiver and we discussed indications for routine and emergent follow-up.        SUBJECTIVE: Patient noted to have low blood pressure today on his vitals.  He denies feeling lightheaded or dizzy.  Patient has chronic  "weakness since his stroke.  Patient has also had some chronic issues with bilateral ankle swelling.  He is no longer taking Invega injections.  He reports that he has been off of them now for about 3 to 4 months.  He has not had any recurrence of hallucinations or of the \"electrical\" sensations he was getting in his head.  He has done a good job of staying completely away from alcohol.  However, he reports that he has used some oral marijuana products over this last few weeks intermittently.  Patient reports that he had been on Flomax in the past and this helped him urinate better and less frequently and he would like to go back on it.  Patient also reports has been getting some daily heartburn symptoms of sour stomach taste in his throat and excessive burping.  He has been using over-the-counter products such as Tums with some relief.  No blood in the stool or black tarry stools.    No past medical history on file.  Patient Active Problem List   Diagnosis    Bipolar affective disorder (H)    Primary insomnia    Alcohol abuse    Anxiety    Schizoaffective disorder, bipolar type (H)    Sinus tachycardia    Hypertriglyceridemia    Erectile dysfunction, unspecified erectile dysfunction type    Drug abuse (H)    Acute right MCA stroke (H)    Left hemiplegia (H)    Marijuana abuse    Alcohol dependence, uncomplicated (H)    Aspiration pneumonia, unspecified aspiration pneumonia type, unspecified laterality, unspecified part of lung (H)    Dysphagia, unspecified type     Current Outpatient Medications   Medication Sig Dispense Refill    atorvastatin (LIPITOR) 80 MG tablet Take 1 tablet (80 mg) by mouth every evening 90 tablet 3    divalproex sodium extended-release (DEPAKOTE ER) 500 MG 24 hr tablet Take 500 mg by mouth At Bedtime      omeprazole (PRILOSEC) 40 MG DR capsule Take 1 capsule (40 mg) by mouth daily 90 capsule 3    propranolol ER (INDERAL LA) 60 MG 24 hr capsule Take 1 capsule (60 mg) by mouth daily 90 capsule " 3    QUEtiapine (SEROQUEL) 200 MG tablet [QUETIAPINE (SEROQUEL) 200 MG TABLET] Take 200 mg by mouth at bedtime. Take with 300 mg tablet for total of 500 mg at bedtime      QUEtiapine (SEROQUEL) 300 MG tablet [QUETIAPINE (SEROQUEL) 300 MG TABLET] Take 300 mg by mouth at bedtime. Take with 200 mg for total of 500 mg nightly      tamsulosin (FLOMAX) 0.4 MG capsule Take 1 capsule (0.4 mg) by mouth daily 90 capsule 3    reason aspirin not prescribed, intentional, Please choose reason not prescribed from choices below. (Patient not taking: Reported on 3/18/2024)      reason aspirin not prescribed, intentional, Please choose reason not prescribed from choices below. (Patient not taking: Reported on 3/18/2024)       History   Smoking Status    Former    Types: Cigarettes, Other   Smokeless Tobacco    Never       OBJECTICE: BP (!) 79/53   Pulse 78   Temp 98  F (36.7  C) (Oral)   Resp 16   Ht 1.829 m (6')   Wt 67.2 kg (148 lb 1.3 oz)   SpO2 98%   BMI 20.08 kg/m       No results found for this or any previous visit (from the past 24 hour(s)).     CV-regular rate and rhythm with no murmur   RESP-lungs clear to auscultation   ABDOMINAL-soft, nontender   EXTREM-mild bilateral lower leg edema, nonpitting, no calf tenderness        Telly Arias MD

## 2024-05-06 ENCOUNTER — TELEPHONE (OUTPATIENT)
Dept: FAMILY MEDICINE | Facility: CLINIC | Age: 54
End: 2024-05-06
Payer: COMMERCIAL

## 2024-05-06 NOTE — TELEPHONE ENCOUNTER
Spoke to patient and the soonest he can come in is Friday,  5/10.  Patient is scheduled with Dr. Castillo on 5/10/2024      Patient mentioned he thinks he is having issues with his BP and that is the reason he is feeling dizzy.      Can a triage nurse call patient to see if it is ok to wait until Friday?  He is aware we will call to triage him.    LuzM aria Wu

## 2024-05-06 NOTE — TELEPHONE ENCOUNTER
Contacted patient to triage for dizziness/troubles urinating x several weeks and schedule sooner appointment.  Patient declined triage and sooner appointment. Patient has no transportation. Patient abruptly hung up / ended call.    Silvia Casillas RN  [facility]

## 2024-05-06 NOTE — TELEPHONE ENCOUNTER
Reason for Call:  Appointment Request    Patient requesting this type of appt:  dizziness/troubles urinating x several weeks    Requested provider: Telly Arias    Reason patient unable to be scheduled: Not within requested timeframe    When does patient want to be seen/preferred time:  soon    Comments:     Okay to leave a detailed message?: Yes at Cell number on file:    Telephone Information:   Mobile [phone]       Call taken on 5/6/2024 at 10:04 AM by Ann Mccann

## 2024-05-14 NOTE — TELEPHONE ENCOUNTER
Pt calling back after call got disconnected. Pt states that he will wait until he sees Dr. Arias on May 30 th to determine next step.

## 2024-05-22 NOTE — PROGRESS NOTES
"ASSESMENT AND PLAN:  Diagnoses and all orders for this visit:    Schizoaffective disorder, bipolar type (H)  Excellent response to Invega.  He has had sustained resolution of the hallucinations and significant improvement in the manic symptoms.  We reviewed the test results from his recent lab draw of the blood work ordered by his psychiatrist.  Results have been forwarded there and I also encouraged the patient to take the paper copy of his test results with him to his follow-up appointment at his psychiatry clinic next week.  Patient counseled again on the risks and benefits of his medication therapy and the benefits of the Invega injection or extreme and he would like to get another injection today which was given today in clinic and he will follow-up again in 4 weeks for his next injection and follow-up appointment with me.    Counseled the patient on the dietary approach to the hypertriglyceridemia and that his psychiatrist may make some adjustments in his medications based on the lab work related to triglycerides and prolactin.      Reviewed the risks and benefits of the treatment plan with the patient and/or caregiver and we discussed indications for routine and emergent follow-up.  Over 15 minutes of total face-to-face time, more than half in counseling and coordination of care on the above.         SUBJECTIVE: 49-year-old male with schizoaffective disorder-bipolar type.  He has had complete resolution of the hallucinations he was having previously, please see previous notes for details, is very happy with the results of his monthly Invega injections.  He feels like he has gotten his life back.  He says \"back then my life was becoming not worth living\" referring to when he was overtly symptomatic with the psychotic and manic symptoms.  However, he has been worrying about his blood test results.  He had a elevation in his triglyceride level and prolactin level from baseline on lab work that had been ordered " by his psychiatry clinic recently.    No past medical history on file.  Patient Active Problem List   Diagnosis     Bipolar affective disorder (H)     Primary insomnia     Alcohol abuse     Anxiety     Schizoaffective disorder, bipolar type (H)     Marijuana abuse in remission     Current Outpatient Medications   Medication Sig Dispense Refill     lurasidone (LATUDA) 40 mg Tab tablet Take 40 mg by mouth at bedtime.       omeprazole (PRILOSEC) 40 MG capsule TAKE ONE CAPSULE BY MOUTH ONE TIME DAILY 90 capsule 2     QUEtiapine (SEROQUEL) 200 MG tablet Take 200 mg by mouth at bedtime. Take with 300 mg tablet for total of 500 mg at bedtime       QUEtiapine (SEROQUEL) 300 MG tablet Take 300 mg by mouth at bedtime. Take with 200 mg for total of 500 mg nightly       Current Facility-Administered Medications   Medication Dose Route Frequency Provider Last Rate Last Dose     paliperidone palmitate IM injection 234 mg (INVEGA SUSTENNA)  234 mg Intramuscular Q30 Days Telly Arias MD   234 mg at 02/13/20 1617     Social History     Tobacco Use   Smoking Status Never Smoker   Smokeless Tobacco Never Used       OBJECTICE: /72   Pulse 97   Temp 98.4  F (36.9  C) (Oral)   Resp 20   Ht 6' (1.829 m)   Wt 217 lb (98.4 kg)   SpO2 96%   BMI 29.43 kg/m       No results found for this or any previous visit (from the past 24 hour(s)).     GEN-alert, appropriate, in no apparent distress   CV-regular rate and rhythm with no murmur   RESP-lungs clear to auscultation   Psychiatric-appearance is well-groomed, speech is only mildly pressured, mood is not depressed, affect is fairly normal, thought content negative for suicidal or homicidal ideation, thought processing negative for paranoid or delusional thinking.    Telly Arias           No

## 2024-05-30 ENCOUNTER — OFFICE VISIT (OUTPATIENT)
Dept: FAMILY MEDICINE | Facility: CLINIC | Age: 54
End: 2024-05-30
Payer: COMMERCIAL

## 2024-05-30 VITALS
WEIGHT: 141.08 LBS | HEART RATE: 68 BPM | DIASTOLIC BLOOD PRESSURE: 57 MMHG | RESPIRATION RATE: 16 BRPM | SYSTOLIC BLOOD PRESSURE: 86 MMHG | OXYGEN SATURATION: 98 % | BODY MASS INDEX: 19.11 KG/M2 | HEIGHT: 72 IN

## 2024-05-30 DIAGNOSIS — F41.9 ANXIETY: ICD-10-CM

## 2024-05-30 DIAGNOSIS — F25.0 SCHIZOAFFECTIVE DISORDER, BIPOLAR TYPE (H): Primary | ICD-10-CM

## 2024-05-30 DIAGNOSIS — R00.0 SINUS TACHYCARDIA: ICD-10-CM

## 2024-05-30 DIAGNOSIS — N40.0 PROSTATE HYPERTROPHY: ICD-10-CM

## 2024-05-30 PROCEDURE — 96372 THER/PROPH/DIAG INJ SC/IM: CPT | Performed by: FAMILY MEDICINE

## 2024-05-30 PROCEDURE — 99214 OFFICE O/P EST MOD 30 MIN: CPT | Mod: 25 | Performed by: FAMILY MEDICINE

## 2024-05-30 RX ORDER — PROPRANOLOL HYDROCHLORIDE 10 MG/1
10 TABLET ORAL 2 TIMES DAILY
Qty: 60 TABLET | Refills: 11 | Status: SHIPPED | OUTPATIENT
Start: 2024-05-30 | End: 2024-09-01 | Stop reason: SINTOL

## 2024-05-30 ASSESSMENT — ANXIETY QUESTIONNAIRES
GAD7 TOTAL SCORE: 12
2. NOT BEING ABLE TO STOP OR CONTROL WORRYING: MORE THAN HALF THE DAYS
5. BEING SO RESTLESS THAT IT IS HARD TO SIT STILL: NEARLY EVERY DAY
6. BECOMING EASILY ANNOYED OR IRRITABLE: MORE THAN HALF THE DAYS
3. WORRYING TOO MUCH ABOUT DIFFERENT THINGS: MORE THAN HALF THE DAYS
8. IF YOU CHECKED OFF ANY PROBLEMS, HOW DIFFICULT HAVE THESE MADE IT FOR YOU TO DO YOUR WORK, TAKE CARE OF THINGS AT HOME, OR GET ALONG WITH OTHER PEOPLE?: SOMEWHAT DIFFICULT
GAD7 TOTAL SCORE: 12
7. FEELING AFRAID AS IF SOMETHING AWFUL MIGHT HAPPEN: NOT AT ALL
GAD7 TOTAL SCORE: 12
IF YOU CHECKED OFF ANY PROBLEMS ON THIS QUESTIONNAIRE, HOW DIFFICULT HAVE THESE PROBLEMS MADE IT FOR YOU TO DO YOUR WORK, TAKE CARE OF THINGS AT HOME, OR GET ALONG WITH OTHER PEOPLE: SOMEWHAT DIFFICULT
1. FEELING NERVOUS, ANXIOUS, OR ON EDGE: MORE THAN HALF THE DAYS
7. FEELING AFRAID AS IF SOMETHING AWFUL MIGHT HAPPEN: NOT AT ALL
4. TROUBLE RELAXING: SEVERAL DAYS

## 2024-05-30 NOTE — PROGRESS NOTES
ASSESMENT AND PLAN:  Diagnoses and all orders for this visit:  Schizoaffective disorder, bipolar type (H)  Worsening.  Patient would like to go back on Invega.  Counseled the patient that ideally this would include a discussion with his psychiatrist but he cannot get in until mid July.  Chart review done with the patient and previously he was on his current medications plus a monthly 156 mg Invega injection and did very well with this.  We do have the medication in the clinic today and he would like to restart it.  After our discussion today we decided to go ahead and restart it and then he will follow-up with his psychiatrist as directed and will follow-up with me closely in the meantime, following for a 1 month nurse only visit for his next injection and then again to be reevaluated by me here in the clinic in 2 months.  -     paliperidone (INVEGA SUSTENNA) injection JEANE 156 mg  Anxiety  His blood pressure is not tolerating the current dose of propranolol so we will discontinue the 60 mg daily extended release and replace it with 10 mg regular release twice daily for a total significant daily dose reduction.  -     propranolol (INDERAL) 10 MG tablet; Take 1 tablet (10 mg) by mouth 2 times daily  Prostate hypertrophy  Good response to tamsulosin, counseling done with the patient and he will continue that medication.  Sinus tachycardia  -     propranolol (INDERAL) 10 MG tablet; Take 1 tablet (10 mg) by mouth 2 times daily      Reviewed the risks and benefits of the treatment plan with the patient and/or caregiver and we discussed indications for routine and emergent follow-up.  The longitudinal plan of care for the diagnosis(es)/condition(s) as documented were addressed during this visit. Due to the added complexity in care, I will continue to support Tripp in the subsequent management and with ongoing continuity of care.        SUBJECTIVE: Patient reports that over the last couple of months he has noticed some  worsening of his schizoaffective symptoms.  He is starting to get racing thoughts and thoughts that are difficult to control and invasive thinking.  These symptoms are consistent with when he has had exacerbations in the past.  He has not had any hallucinations like he has in the past.  He is taking his quetiapine and Depakote and propranolol.  However, he has been noticing that he is feeling dizzy and lightheaded, especially when going from sitting to standing.  No episodes of syncope.  Patient has noticed improved urination since starting on tamsulosin.    No past medical history on file.  Patient Active Problem List   Diagnosis    Bipolar affective disorder (H)    Primary insomnia    Alcohol abuse    Anxiety    Schizoaffective disorder, bipolar type (H)    Sinus tachycardia    Hypertriglyceridemia    Erectile dysfunction, unspecified erectile dysfunction type    Drug abuse (H)    Acute right MCA stroke (H)    Left hemiplegia (H)    Marijuana abuse    Alcohol dependence, uncomplicated (H)    Aspiration pneumonia, unspecified aspiration pneumonia type, unspecified laterality, unspecified part of lung (H)    Dysphagia, unspecified type    Prostate hypertrophy     Current Outpatient Medications   Medication Sig Dispense Refill    atorvastatin (LIPITOR) 80 MG tablet Take 1 tablet (80 mg) by mouth every evening 90 tablet 3    divalproex sodium extended-release (DEPAKOTE ER) 500 MG 24 hr tablet Take 500 mg by mouth At Bedtime      omeprazole (PRILOSEC) 40 MG DR capsule Take 1 capsule (40 mg) by mouth daily 90 capsule 3    propranolol (INDERAL) 10 MG tablet Take 1 tablet (10 mg) by mouth 2 times daily 60 tablet 11    QUEtiapine (SEROQUEL) 200 MG tablet [QUETIAPINE (SEROQUEL) 200 MG TABLET] Take 200 mg by mouth at bedtime. Take with 300 mg tablet for total of 500 mg at bedtime      QUEtiapine (SEROQUEL) 300 MG tablet [QUETIAPINE (SEROQUEL) 300 MG TABLET] Take 300 mg by mouth at bedtime. Take with 200 mg for total of 500  mg nightly      tamsulosin (FLOMAX) 0.4 MG capsule Take 1 capsule (0.4 mg) by mouth daily 90 capsule 3    reason aspirin not prescribed, intentional, Please choose reason not prescribed from choices below. (Patient not taking: Reported on 3/18/2024)       History   Smoking Status    Former    Types: Cigarettes, Other   Smokeless Tobacco    Never       OBJECTICE: BP (!) 86/57 (BP Location: Right arm, Patient Position: Sitting, Cuff Size: Adult Regular)   Pulse 68   Resp 16   Ht 1.829 m (6')   Wt 64 kg (141 lb 1.3 oz)   SpO2 98%   BMI 19.13 kg/m       No results found for this or any previous visit (from the past 24 hour(s)).     CV-regular rate and rhythm with no murmur   RESP-lungs clear to auscultation   Psychiatric-appearance is well-groomed, speech is somewhat pressured, mood is somewhat depressed, affect is normal, thought content negative for suicidal or homicidal ideation, thought processing negative for paranoid or delusional thinking.    Signed Electronically by: Telly Arias MD

## 2024-08-08 ENCOUNTER — TRANSFERRED RECORDS (OUTPATIENT)
Dept: HEALTH INFORMATION MANAGEMENT | Facility: CLINIC | Age: 54
End: 2024-08-08
Payer: COMMERCIAL

## 2024-08-26 ENCOUNTER — OFFICE VISIT (OUTPATIENT)
Dept: FAMILY MEDICINE | Facility: CLINIC | Age: 54
End: 2024-08-26
Payer: COMMERCIAL

## 2024-08-26 VITALS
SYSTOLIC BLOOD PRESSURE: 90 MMHG | OXYGEN SATURATION: 98 % | WEIGHT: 136.12 LBS | HEIGHT: 72 IN | BODY MASS INDEX: 18.44 KG/M2 | HEART RATE: 105 BPM | DIASTOLIC BLOOD PRESSURE: 62 MMHG | RESPIRATION RATE: 18 BRPM | TEMPERATURE: 98.9 F

## 2024-08-26 DIAGNOSIS — T17.908D ASPIRATION INTO AIRWAY, SUBSEQUENT ENCOUNTER: Primary | ICD-10-CM

## 2024-08-26 DIAGNOSIS — F41.9 ANXIETY: ICD-10-CM

## 2024-08-26 DIAGNOSIS — A41.9 SEPSIS, DUE TO UNSPECIFIED ORGANISM, UNSPECIFIED WHETHER ACUTE ORGAN DYSFUNCTION PRESENT (H): ICD-10-CM

## 2024-08-26 DIAGNOSIS — F31.9 BIPOLAR AFFECTIVE DISORDER, REMISSION STATUS UNSPECIFIED (H): ICD-10-CM

## 2024-08-26 DIAGNOSIS — F25.0 SCHIZOAFFECTIVE DISORDER, BIPOLAR TYPE (H): ICD-10-CM

## 2024-08-26 PROCEDURE — 99213 OFFICE O/P EST LOW 20 MIN: CPT | Performed by: FAMILY MEDICINE

## 2024-08-26 NOTE — PROGRESS NOTES
Assessment & Plan     Schizoaffective disorder, bipolar type (H)  Anxiety  Stable, but needs to establish care with psychiatry asap.   - Adult Mental Health  Referral  - Primary Care - Care Coordination Referral    Recent hospitalization for pneumonia, sepsis  Acute worsening of  chronic dysphagia; aspiration, recent G tube placement  Continuing to improve. Care coordination referral to help ensure follow up with multiple specialties at [facility] and [facility], see patient instructions  - Primary Care - Care Coordination Referral      Lives with cousin Pat and Pat's wife Yocasta, and they are helping with medications, G-tube management, appointments. Patient sometimes will make an appointment on his own, and then does not tell his caregivers, and then miss appointment. Patient is in agreement with allowing family caregivers to help.     Patient Instructions   [facility] psychiatry referral has been placed- you should get a call within a couple of days to schedule    I have referred to our care coordination team to help make sure all of the appointments and follow up are getting done    Appointments needed - that have been ordered through regions and should be scheduled with regions:    Echocardiogram of the heart in 3 months  Swallow evaluation sometime after Sept 3  PMR clinic appointment in 6-8 weeks  G tube should be replaced around mid Nov 2024  PT/OT/speech for neuroscience center           MED REC REQUIRED  Post Medication Reconciliation Status:  Discharge medications reconciled, continue medications without change        Kimberli Almaguer is a 54 year old, presenting for the following health issues:  Hospital F/U        8/26/2024     2:31 PM   Additional Questions   Roomed by rachel dumont   Accompanied by cousin     CATRACHITA     Cousin here - lives w patient, caretaker for past 2 years (prior to that, was living in assisted living facility).     Hospitalized regions 7/29-8/8, presented with hypotension,  Patient : Mirta Sanz Age: 26 month old Sex: female   MRN: [id number] Encounter Date: 12/22/2018    G24/G24    History     Chief Complaint   Patient presents with   • Fever 9 Weeks to 74 years   • Diarrhea Pediatric     HPI  12/22/2018  11:46 PM Mirta Sanz is a 26 month old female who presents to the ED with her mother for evaluation of a fever of 103.6 F that began at 12/14. The pt's mother states she has been giving the pt Tylenol and ibuprofen. Associated symptoms include rhinorrhea, diarrhea, a decreased appetite, and a cough. The pt's mother states that the pt is not up to date on her immunizations, and that her other daughter has recently been sick with similar symptoms, although she did not have diarrhea or a cough. The pt's mother denies ear pain, a rash, or any other associated symptoms. There are no further complaints or modifying factors at this time.         PCP: No primary care provider.    No Known Allergies    No current outpatient medications.     History reviewed. No pertinent past medical history.    History reviewed. No pertinent past surgical history.    History reviewed. No pertinent family history.        Social History     Tobacco Use   • Smoking status: Never used   Substance Use Topics   • Alcohol use: Never used   • Drug use: Never used       Review of Systems   Constitutional: Positive for appetite change (decreased), fatigue and fever (103.6 F). Negative for activity change and irritability.   HENT: Positive for rhinorrhea. Negative for ear discharge, ear pain, mouth sores and sore throat.    Eyes: Negative for discharge and redness.   Respiratory: Positive for cough. Negative for wheezing.    Gastrointestinal: Positive for diarrhea. Negative for vomiting.   Genitourinary: Negative for dysuria and frequency.   Musculoskeletal: Negative for neck stiffness.   Skin: Negative for rash.   Neurological: Negative for seizures.   All other systems reviewed and are  hypoxia, found to have pneumonia, sepsis. Issues during hospital stay:    CAP/ARDS - H influenza pneumonia on admission; extubated 8/1, completed abx    DVT - eliquis x 6 months    Hypotension - acute due to sepsis on chronic; stopped propranolol, and started metoprolol tartrate instead, but this also was held after transfer to acute physical med/rehab stay due to hypotension.    Palliative care referral placed while inpatient due to continue to discuss feeding tube goals    Aspiration - bedside swallow showed aspiration risk; feeding tube and eventually G tube placed. NPO except for small tastes for enjoyment; repeat swallow study advised after 9/3 to allow time for healing/strengthening. It was felt that he may have had some dysphagia/aspiration risk at baseline after his stroke a couple of years ago, and then dysphagia worsened due deconditioning/weakness following major illness, intubated, in ICU. Doing swallow exercises at home as advised by speech therapy    Encephalopathy on admission thought due to sepsis, also concern for recent EtOH use (patient denied on admission, but there was some concern expressed by family)    HypoNa, anemia, thrombocytopenia    Malnutrition - G tube placed and started on tube feeds; cousin is managing feeds, flushes, g tube site care    Chronic L hemiparesis - old R MCA stroke    EF 45-50% pericardial effusion, tachycardia - repeat TTE advised in 3 mos    BPH - flomax    Patient admitted for intensive inpatient rehab w PMR at [facility] 8/8, PEG tube placed 8/13. Just discharged from inpatient rehab a couple of days ago    PMR outpatient clinic follow up advised in 6-8 weeks    Doing home exercises - PT and swallow. Was supposed to start outpatient therapy, but patient made appt without caregiver help and forgot appt details.     Psychiatry was consulted during PMR rehab stay, and felt there was not a need to restart invega (per PMR summary) and follow up with outpatient psychiatrist.  negative.      Physical Exam     ED Triage Vitals   ED Triage Vitals Group      Temp 12/22/18 2240 (!) 103 °F (39.4 °C)      Heart Rate 12/22/18 2239 168      Resp 12/22/18 2239 22      BP --       SpO2 12/22/18 2239 93 %      EtCO2 mmHg --       Height 12/22/18 2240 2' 11\" (0.889 m)      Weight 12/22/18 2240 23 lb (10.4 kg)      Weight Scale Used 12/22/18 2240 ED Actual       Physical Exam   Constitutional: She appears well-developed and well-nourished. She is active. She appears ill. No distress.   HENT:   Head: Atraumatic.   Right Ear: Tympanic membrane normal.   Left Ear: Tympanic membrane normal.   Nose: Nasal discharge (crusty) present.   Mouth/Throat: Mucous membranes are moist. Oropharynx is clear. Pharynx is normal.   Eyes: Conjunctivae are normal. Pupils are equal, round, and reactive to light. Right eye exhibits no discharge. Left eye exhibits no discharge.   Neck: Normal range of motion. Neck supple. No neck rigidity or neck adenopathy.   Cardiovascular: Normal rate and regular rhythm.   No murmur heard.  Pulmonary/Chest: Effort normal and breath sounds normal. No respiratory distress. She has no wheezes.   Abdominal: Soft. She exhibits no distension. There is no tenderness. No hernia.   Neurological: She is alert. GCS eye subscore is 4. GCS verbal subscore is 5. GCS motor subscore is 6.   Skin: No rash noted. She is not diaphoretic.   Resilient skin turgor    Nursing note and vitals reviewed.      ED Course     Procedures    Lab Results     Results for orders placed or performed during the hospital encounter of 12/22/18   Influenza Rapid A/B   Result Value Ref Range    SOURCE NASOPHARYNGEAL SWAB     INFLUENZA A NEGATIVE NEGATIVE    INFLUENZA B ANTIGEN NEGATIVE NEGATIVE   Rapid strep A POC   Result Value Ref Range    GRP A STREP Negative     Internal Procedural Controls Acceptable Yes        Radiology Results     Imaging Results          XR Chest AP or PA (Final result)  Result time 12/23/18 01:53:26     Continues on Seroquel.     Per summary in epic:  DISCHARGE RECOMMENDATIONS: Hydroxyzine 25 mg per feeding tube t.i.d. as needed for anxiety; thiamine 100 mg daily; apixaban 5 mg b.i.d.; atorvastatin 80 mg daily; quetiapine 500 mg at bedtime, 50 mg b.i.d.; valproic acid 250 mg b.i.d.; acetaminophen p.r.n.; and lidocaine ointment to the PEG site 4 times daily as needed. He has been provided with feeding tubes and bags and gauze pads. He will receive Fibersource 8 cans into the feeding tube, two 4 times daily; water flushes 30 mL before and after the bolus; omeprazole 20 mg daily; oxycodone 2.5 mg t.i.d. as needed for severe pain, wean off; and senna 1 tablet b.i.d.    He can be up ad estephania. Regular feeding tube site care. Abdominal binder until the feeding tube site nontender. [facility] to follow regarding tube feedings. No alcohol or tobacco. Sutures can be removed from T-fasteners in 7 to 10 days, Interventional Radiology, [phone]. G-tube change around 11/19/2024. N.p.o. other than for pleasure tasting as noted above. Outpatient PT, OT, and speech at the [facility] center. Follow up Dr. Halle Hickey, 6 to 8 weeks. Follow up primary care, Telly Arias MD, in the next 1 to 2 weeks. Followup TTE in 3 months. Follow up Psychiatry, [facility]. Family to continue to provide PCA care.     Hospital Follow-up Visit:    Hospital/Nursing Home/IP Rehab Facility:  [facility]  Date of Admission: 7/29/24  Date of Discharge: 8/8/24 - transferred to intensive inpatient PMR  Reason(s) for Admission: hypoxia, respiratory failure, pneumonia, sepsis  Was the patient in the ICU or did the patient experience delirium during hospitalization?  Yes     Do you have any other stressors you would like to discuss with your provider? No    Problems taking medications regularly:  None  Medication changes since discharge: None  Problems adhering to non-medication therapy:  None    Summary of hospitalization:  [facility]  Final result                 Impression:    IMPRESSION:      Findings suggestive of viral or reactive airways disease. No evidence of  focal consolidation.    I have personally reviewed the images and modified the resident's report as  necessary.               Narrative:    XR CHEST AP OR PA 12/23/2018 12:26 AM    HISTORY: Fever.    COMPARISON: None available.    TECHNIQUE: AP portable supine view of the chest and upper abdomen.    FINDINGS:      The cardiothymic silhouette and pulmonary vasculature are within normal  limits. There are increased bilateral perihilar and peribronchial markings.  There is hyperinflation with flattening of the diaphragms. No focal  consolidation. No pleural effusion or pneumothorax.    Nonobstructive bowel gas pattern of the upper abdomen.                      Preliminary result                 Impression:          Findings suggestive of viral or reactive airways disease. No evidence of  focal consolidation.               Narrative:    XR CHEST AP OR PA 12/23/2018 12:26 AM    HISTORY: Fever.    COMPARISON: None available.    TECHNIQUE: AP portable supine view of the chest and upper abdomen.    FINDINGS:      The cardiothymic silhouette and pulmonary vasculature are within normal  limits. There are increased bilateral perihilar and peribronchial   markings.  There is hyperinflation with flattening of the diaphragms. No focal  consolidation. No pleural effusion or pneumothorax.    Nonobstructive bowel gas pattern of the upper abdomen.                                    ED Medication Orders (From admission, onward)    Start Ordered     Status Ordering Provider    12/22/18 2345 12/22/18 2344  ibuprofen (MOTRIN,ADVIL) 100 MG/5ML suspension 104 mg  ONCE      Last MAR action:  Given ALBINA ZUNIGA  Vitals  Vitals:    12/22/18 2239 12/22/18 2240 12/23/18 0318   Pulse: 168  138   Resp: 22 21   Temp:  (!) 103 °F (39.4 °C) 97 °F (36.1 °C)   TempSrc:  Oral Oral   SpO2: 93%      Weight:  10.4 kg    Height:  2' 11\" (0.889 m)        ED Course  Initial Impression: Pt presents with a fever of 103.6 F, a cough, and diarrhea. Plan for treatment with ibuprofen, as well as strep and influenza testing. The pt is in no distress.     12:55 AM I rechecked the pt who is sleeping. I updated the pt's mother on the CXR results, which shows evidence of a viral respiratory infection. I offered the pt's mother the option of a catheterized urine sample to evaluate for a UTI, which she declined at this time. I will continue to monitor the pt in the department to see how she tolerates PO. The pt's mother understands and agrees with the plan. All questions were addressed at this time.     3:20 AM The pt's nurse stated that the pt's mother would like to leave at this time.     3:22 AM I rechecked the pt who is sleeping. I informed the pt that we still have not tested the pt for a UTI. The pt's mother states that she is comfortable leaving the department at this time. I observed the pt standing on the bed. I advised the pt's mother to return the pt to the ED for any new or worsening symptoms. The pt understands and agrees with the plan. All questions were addressed at this time.       MDM  Critical Care time spent on this patient outside of billable procedures:  None  Pt appears well after fever control.  Has tolerated PO.  Suspect viral URI.   source has not been ruled out but mother prefers to defer straight cath.  Mom requesting d/c.      Clinical Impression  ED Diagnoses        Final diagnoses    Viral URI with cough          Diarrhea, unspecified type                Follow Up:  [facility]  EMERGENCY DEPARTMENT    Go to  If symptoms worsen        Summary of your Discharge Medications      Take these Medications      Details   ibuprofen 100 MG/5ML suspension  Commonly known as:  MOTRIN,ADVIL   Take 5.2 mLs by mouth every 8 hours as needed for Pain or Fever.              Pt is discharged  "information obtained and reviewed  Diagnostic Tests/Treatments reviewed.  Follow up needed: see patient instructions for list  Other Healthcare Providers Involved in Patient s Care:         Specialist appointment - PMR, palliative care, interventional radiology (PEG tube), psychiatry, Physical Therapy, Imaging, and speech, OT  Update since discharge: improved.         Plan of care communicated with patient, family, and caregiver                            Objective    BP 90/62   Pulse 105   Temp 98.9  F (37.2  C) (Oral)   Resp 18   Ht 1.829 m (6' 0.01\")   Wt 61.7 kg (136 lb 1.9 oz)   SpO2 98%   BMI 18.46 kg/m    Body mass index is 18.46 kg/m .  Physical Exam   GENERAL: alert and no distress, thin/frail-appearing  EYES: Eyes grossly normal to inspection, PERRL and conjunctivae and sclerae normal  HENT: mouth without ulcers or lesions  NECK: no adenopathy  RESP: lungs clear to auscultation - no rales, rhonchi or wheezes  CV: regular rate and rhythm, normal S1 S2, no S3 or S4, no murmur, click or rub, no peripheral edema  ABDOMEN: PEG tube site without erythema or drainage, clean guaze; abd soft, nontender  MS: no gross musculoskeletal defects noted, no edema  Neuro: left sided weakness            Signed Electronically by: Radha Devries MD     " to home/self care in stable condition.       I have reviewed the information recorded by the scribe for accuracy and agree with its contents.    ____________________________________________________________________    Priya Ellison acting as a scribe for Nick Chirinos PA-C.    Nick Chirinos PA-C  Dictation # [id number]  Scribe: Priya Ellison    Attending Physician: Dr. Donovan Cooley  Dictation # [id number]       Nick Chirinos PA-C  12/23/18 0420

## 2024-08-26 NOTE — PATIENT INSTRUCTIONS
[facility] psychiatry referral has been placed- you should get a call within a couple of days to schedule    I have referred to our care coordination team to help make sure all of the appointments and follow up are getting done    Appointments needed - that have been ordered through regions and should be scheduled with regions:    Echocardiogram of the heart in 3 months  Swallow evaluation sometime after Sept 3  PMR clinic appointment in 6-8 weeks  G tube should be replaced around mid Nov 2024  PT/OT/speech for neuroscience [place]

## 2024-08-27 ENCOUNTER — PATIENT OUTREACH (OUTPATIENT)
Dept: CARE COORDINATION | Facility: CLINIC | Age: 54
End: 2024-08-27
Payer: COMMERCIAL

## 2024-08-27 NOTE — PROGRESS NOTES
Clinic Care Coordination Contact  Community Health Worker Initial Outreach    CHW Initial Information Gathering:  Referral Source: PCP  Preferred Hospital: [facility]  [phone]  Preferred Urgent Care: [facility], [phone]  Current living arrangement:: I live in a private home with family  Type of residence:: Private home - stairs  Community Resources: None  Supplies Currently Used at Home: None  Equipment Currently Used at Home: none  Informal Support system:: Family  No PCP office visit in Past Year: No  Transportation means:: Family, Accessible car  CHW Additional Questions  If ED/Hospital discharge, follow-up appointment scheduled as recommended?: N/A  Medication changes made following ED/Hospital discharge?: N/A  MyChart active?: No  Patient agreeable to assistance with activating MyChart?: No    Patient accepts CC: Yes. Patient scheduled for assessment with CCC NITA on 8/28/2024 at 11:00 AM. Patient noted desire to discuss needs new psychiatrist asap.

## 2024-08-28 ENCOUNTER — PATIENT OUTREACH (OUTPATIENT)
Dept: NURSING | Facility: CLINIC | Age: 54
End: 2024-08-28
Payer: COMMERCIAL

## 2024-08-28 ASSESSMENT — ACTIVITIES OF DAILY LIVING (ADL): DEPENDENT_IADLS:: LAUNDRY;SHOPPING;MEAL PREPARATION;MEDICATION MANAGEMENT;MONEY MANAGEMENT;TRANSPORTATION

## 2024-08-28 NOTE — PROGRESS NOTES
Clinic Care Coordination Contact  Clinic Care Coordination Contact  OUTREACH    Referral Information:  Referral Source: PCP         Chief Complaint   Patient presents with    Clinic Care Coordination - Initial        Universal Utilization: appropriate  Clinic Utilization  Difficulty keeping appointments:: No  Compliance Concerns: No  No-Show Concerns: No  No PCP office visit in Past Year: No  Utilization      No Show Count (past year)  6             ED Visits  2             Hospital Admissions  1                    Current as of: 8/28/2024  8:23 AM                Clinical Concerns:  Current Medical Concerns:  none    Current Behavioral Concerns: none    Education Provided to patient: CCC education, support and resources   Pain  Pain (GOAL):: No  Health Maintenance Reviewed: Due/Overdue   Health Maintenance Due   Topic Date Due    ADVANCE CARE PLANNING  Never done    Pneumococcal Vaccine: Pediatrics (0 to 5 Years) and At-Risk Patients (6 to 64 Years) (1 of 2 - PCV) Never done    COLORECTAL CANCER SCREENING  06/07/2014    ZOSTER IMMUNIZATION (1 of 2) Never done    MEDICARE ANNUAL WELLNESS VISIT  11/14/2020    LIPID  02/14/2023    HEPATITIS B IMMUNIZATION (3 of 3 - 19+ 3-dose series) 07/10/2023      Clinical Pathway: None    Medication Management:  Medication review status: Medications reviewed and no changes reported per patient.             Functional Status:  Dependent ADLs:: Bathing, Grooming  Dependent IADLs:: Laundry, Shopping, Meal Preparation, Medication Management, Money Management, Transportation  Bed or wheelchair confined:: No  Mobility Status: Independent    Living Situation:  Current living arrangement:: I live in a private home with family  Type of residence:: Private home - stairs    Lifestyle & Psychosocial Needs:    Social Determinants of Health     Food Insecurity: Patient Declined (8/8/2024)    Received from [facility]    Hunger Vital Sign     Worried About Running Out of Food in the Last Year:  Patient declined     Ran Out of Food in the Last Year: Patient declined   Depression: Not at risk (5/30/2024)    PHQ-2     PHQ-2 Score: 0   Housing Stability: Patient Declined (8/8/2024)    Received from [facility]    Housing Stability Vital Sign     Unable to Pay for Housing in the Last Year: Patient declined     Number of Places Lived in the Last Year: Not on file     Unstable Housing in the Last Year: Patient declined   Tobacco Use: Medium Risk (8/26/2024)    Patient History     Smoking Tobacco Use: Former     Smokeless Tobacco Use: Never     Passive Exposure: Not on file   Financial Resource Strain: High Risk (11/20/2023)    Financial Resource Strain     Within the past 12 months, have you or your family members you live with been unable to get utilities (heat, electricity) when it was really needed?: Yes   Alcohol Use: Not on file   Transportation Needs: Patient Declined (8/8/2024)    Received from [facility]    PRAPARE - Transportation     Lack of Transportation (Medical): Patient declined     Lack of Transportation (Non-Medical): Patient declined   Physical Activity: Not on file   Interpersonal Safety: Unknown (8/8/2024)    Received from [facility]    Humiliation, Afraid, Rape, and Kick questionnaire     Fear of Current or Ex-Partner: Not on file     Emotionally Abused: Patient declined     Physically Abused: Patient declined     Sexually Abused: Patient declined   Stress: Not on file   Social Connections: Not on file   Health Literacy: Not on file     Diet:: Regular  Inadequate nutrition (GOAL):: No  Tube Feeding: No  Inadequate activity/exercise (GOAL):: No  Significant changes in sleep pattern (GOAL): No  Transportation means:: Family, Accessible car, Regular car     Taoist or spiritual beliefs that impact treatment:: No  Mental health DX:: No  Mental health management concern (GOAL):: No  Chemical Dependency Status: No Current Concerns  Informal Support system:: Family      Resources and  Interventions:  Current Resources:      Community Resources: None  Supplies Currently Used at Home: None  Equipment Currently Used at Home: none  Employment Status: disabled         Referrals Placed: Behavioral Health Providers         Care Plan:  Care Plan: Mental Health       Problem: Mental Health Symptoms Need Improvement       Goal: Improve management of mental health symptoms and establish with mental health/psychosocial supports       Start Date: 8/28/2024    This Visit's Progress: 40%    Priority: High    Note:     Barriers: mental health  Strengths: supportive family  Patient expressed understanding of goal: yes  Action steps to achieve this goal:  1. I will attend my appointment at [facility] on 9/6 @ 1:45  2. I will attend my follow up appointment on 10/7 @ 9:35  3. I will reach out to [facility] if more support is needed reaching this goal.                                Patient/Caregiver understanding: yes    Outreach Frequency: monthly, more frequently as needed      Plan: CCSW spoke with pt's cousin Pat regarding referral. Pat stated that pt had been seeing a provider at [facility] for over 10 years and that provider will no longer see him. CCSW contacted [facility] and scheduled pt with new provider on 9/6 @ 1:45 and a follow up on 10/7 @ 9:35. Called Pat back and let him know of appointments.     Lorie Diane, MSW, LGSW  Social Work Care Coordinator

## 2024-09-01 ENCOUNTER — TELEPHONE (OUTPATIENT)
Dept: FAMILY MEDICINE | Facility: CLINIC | Age: 54
End: 2024-09-01
Payer: COMMERCIAL

## 2024-09-01 RX ORDER — AMOXICILLIN 250 MG
1 CAPSULE ORAL 2 TIMES DAILY PRN
COMMUNITY
Start: 2024-08-21

## 2024-09-01 RX ORDER — NUT.TX.IMPAIRED RENAL FXN,SOY 0.09G-2/ML
2 LIQUID (ML) ORAL 4 TIMES DAILY
COMMUNITY
Start: 2024-08-22 | End: 2024-09-21

## 2024-09-01 RX ORDER — HYDROXYZINE PAMOATE 25 MG/1
25 CAPSULE ORAL 3 TIMES DAILY PRN
COMMUNITY
Start: 2024-08-21 | End: 2024-09-06

## 2024-09-01 RX ORDER — GAUZE BANDAGE 2" X 2"
100 BANDAGE TOPICAL DAILY
COMMUNITY
Start: 2024-08-21

## 2024-09-01 RX ORDER — QUETIAPINE FUMARATE 100 MG/1
500 TABLET, FILM COATED ORAL AT BEDTIME
COMMUNITY
Start: 2024-08-21

## 2024-09-01 RX ORDER — VALPROIC ACID 250 MG/5ML
250 SOLUTION ORAL 2 TIMES DAILY
COMMUNITY
Start: 2024-08-21

## 2024-09-01 RX ORDER — LIDOCAINE 50 MG/G
OINTMENT TOPICAL 4 TIMES DAILY PRN
COMMUNITY
Start: 2024-08-21

## 2024-09-01 RX ORDER — QUETIAPINE FUMARATE 50 MG/1
50 TABLET, FILM COATED ORAL 2 TIMES DAILY PRN
COMMUNITY
Start: 2024-08-21

## 2024-09-01 RX ORDER — ACETAMINOPHEN 325 MG/1
325-650 TABLET ORAL EVERY 6 HOURS PRN
COMMUNITY
Start: 2024-08-21

## 2024-09-02 NOTE — TELEPHONE ENCOUNTER
I see that patient was not able to get into psychiatry until January. He needs to be seen sooner - can he get in sooner to non-[facility] provider?     In addition, please schedule him with PCP Dr. Arias for first available appt - ideally within the next 3-4 weeks for med check/follow up.

## 2024-09-06 DIAGNOSIS — Z76.0 ENCOUNTER FOR MEDICATION REFILL: Primary | ICD-10-CM

## 2024-09-06 NOTE — TELEPHONE ENCOUNTER
Medication Question or Refill    What medication are you calling about (include dose and sig)?: hydrOXYzine taylor (VISTARIL) 25 MG capsule     Preferred Pharmacy:  [id number] - [place], [place] - [address]  [address]  [place] [address]  Phone: [phone] Fax: [phone]    Controlled Substance Agreement on file:   CSA -- Patient Level:    CSA: None found at the patient level.       Who prescribed the medication?: Jaycob    Do you need a refill? Yes    When did you use the medication last? Today, PCA states they will be running out as it's taken as needed    Patient offered an appointment? Yes: upcoming appointment for med check on 10/15 with JL     Do you have any questions or concerns?  No      Okay to leave a detailed message?: Yes at Cell number on file:    Telephone Information:   Mobile [phone]

## 2024-09-06 NOTE — TELEPHONE ENCOUNTER
Spoke to patent and Marcquan (PCA on C2C).   Scheduled for medication follow up   10/15/2024 10:00 AM (Arrive by 9:40 AM) Telly Arias MD [facility]     Per PCA patient has an initial psychiatry appointment with [facility] today. DIONISIO please advise if ok to cancel new psychiatry appointment on 02/24/2025 with [place].

## 2024-09-09 RX ORDER — HYDROXYZINE PAMOATE 25 MG/1
25 CAPSULE ORAL 3 TIMES DAILY PRN
Qty: 60 CAPSULE | Refills: 1 | Status: SHIPPED | OUTPATIENT
Start: 2024-09-09

## 2024-09-09 NOTE — TELEPHONE ENCOUNTER
He only needs 1 is psychiatrist so if he already had an appointment with psychiatry and is established and following up at [facility] then can cancel the therapy 1.  Thanks.

## 2024-10-02 ENCOUNTER — PATIENT OUTREACH (OUTPATIENT)
Dept: CARE COORDINATION | Facility: CLINIC | Age: 54
End: 2024-10-02
Payer: COMMERCIAL

## 2024-10-02 NOTE — PROGRESS NOTES
Clinic Care Coordination Contact  Patient has completed all goals with Clinic Care Coordination.  Please review the chart and confirm if maintenance /graduate is approved.    -Patient is reconnected with psychiatrist at [facility], attended appointment on 9/06/2024, but cancelled on 10/07/2024 but caregivers/PCA will be calling and reschedule the appointment.   -CHW reminded patient and caregivers of the PCP follow up appointment.   -No additional coordination assistance or resources needed at this time.  -Patient is receiving PCA services 70 hours per week.  -Patient and caregiver were informed to call with questions or concerns.

## 2024-10-03 ENCOUNTER — PATIENT OUTREACH (OUTPATIENT)
Dept: CARE COORDINATION | Facility: CLINIC | Age: 54
End: 2024-10-03
Payer: COMMERCIAL

## 2024-10-03 NOTE — PROGRESS NOTES
Clinic Care Coordination Contact    Assessment: Care Coordinator contacted patient for 2 month follow up.  Patient has continued to follow the plan of care and assessment is negative for any new needs or concerns.    Enrollment status: Graduated.      Plan: No further outreaches at this time.  Patient will continue to follow the plan of care.  If new needs arise a new Care Coordination referral may be placed.  FYI to PCP    STEPHENIE Escudero, NITA  Social Work Care Coordinator

## 2024-10-03 NOTE — LETTER
[facility] CARE COORDINATION  [facility]  October 3, 2024    Tripp Sanders  [address]  [place] [address]    Dear Tripp,  Your Care Team congratulates you on your journey to maintain wellness. This document will help guide you on your journey to maintain a healthy lifestyle.  You can use this to help you overcome any barriers you may encounter.  If you should have any questions or concerns, you can contact the members of your Care Team or contact your Primary Care Clinic for assistance.     Health Maintenance  Health Maintenance Reviewed:      My Access Plan  Medical Emergency 911   Primary Clinic Line [facility] - [phone]   24 Hour Appointment Line [phone] or  [phone]) (toll-free)   24 Hour Nurse Line [phone] (toll-free)   Preferred Urgent Care     Preferred Hospital     Preferred Pharmacy [id number] - [place], [place] - [address]      Behavioral Health Crisis Line The National Suicide Prevention Lifeline at 1-597.146.9643 or 911     My Care Team Members  Patient Care Team         Relationship Specialty Notifications Start End    Telly Arias MD PCP - General Family Practice  7/28/16     Phone: [phone] Fax: [phone]         [address] [place] [address]    Telly Arias MD Assigned PCP   6/16/21     Phone: [phone] Fax: [phone]         [address] [place], [place] [address] [place] [address]    Antolin Bryson, PharmD Pharmacist Pharmacist  6/29/21     Phone: [phone]          [facility] [address] [place] [address]    Robi Schneider CHW Community Health Worker Primary Care - CC Admissions 8/27/24 10/3/24    Phone: [phone] Fax: [phone]         [address] [address]              Advance Care Plans/Directives Type:       It has been your Clinic Care Team's pleasure to work with you on accomplishing your goals.    Regards,  Your Clinic Care Team

## 2024-10-30 ENCOUNTER — OFFICE VISIT (OUTPATIENT)
Dept: FAMILY MEDICINE | Facility: CLINIC | Age: 54
End: 2024-10-30
Payer: COMMERCIAL

## 2024-10-30 VITALS
BODY MASS INDEX: 20.74 KG/M2 | DIASTOLIC BLOOD PRESSURE: 65 MMHG | TEMPERATURE: 97.8 F | HEIGHT: 72 IN | SYSTOLIC BLOOD PRESSURE: 92 MMHG | RESPIRATION RATE: 16 BRPM | HEART RATE: 91 BPM | WEIGHT: 153.08 LBS | OXYGEN SATURATION: 99 %

## 2024-10-30 DIAGNOSIS — Z23 NEED FOR VACCINATION: ICD-10-CM

## 2024-10-30 DIAGNOSIS — I63.511 ACUTE RIGHT MCA STROKE (H): ICD-10-CM

## 2024-10-30 DIAGNOSIS — I26.99 PULMONARY EMBOLISM, OTHER, UNSPECIFIED CHRONICITY, UNSPECIFIED WHETHER ACUTE COR PULMONALE PRESENT (H): ICD-10-CM

## 2024-10-30 DIAGNOSIS — I50.9 ACUTE HEART FAILURE, UNSPECIFIED HEART FAILURE TYPE (H): Primary | ICD-10-CM

## 2024-10-30 DIAGNOSIS — Z76.0 ENCOUNTER FOR MEDICATION REFILL: ICD-10-CM

## 2024-10-30 DIAGNOSIS — F25.0 SCHIZOAFFECTIVE DISORDER, BIPOLAR TYPE (H): ICD-10-CM

## 2024-10-30 PROCEDURE — G0008 ADMIN INFLUENZA VIRUS VAC: HCPCS | Performed by: FAMILY MEDICINE

## 2024-10-30 PROCEDURE — 90480 ADMN SARSCOV2 VAC 1/ONLY CMP: CPT | Performed by: FAMILY MEDICINE

## 2024-10-30 PROCEDURE — 90673 RIV3 VACCINE NO PRESERV IM: CPT | Performed by: FAMILY MEDICINE

## 2024-10-30 PROCEDURE — 99214 OFFICE O/P EST MOD 30 MIN: CPT | Mod: 25 | Performed by: FAMILY MEDICINE

## 2024-10-30 PROCEDURE — 91320 SARSCV2 VAC 30MCG TRS-SUC IM: CPT | Performed by: FAMILY MEDICINE

## 2024-10-30 RX ORDER — GAUZE BANDAGE 2" X 2"
100 BANDAGE TOPICAL DAILY
Qty: 90 TABLET | Refills: 3 | Status: SHIPPED | OUTPATIENT
Start: 2024-10-30

## 2024-10-30 RX ORDER — ATORVASTATIN CALCIUM 80 MG/1
80 TABLET, FILM COATED ORAL EVERY EVENING
Qty: 90 TABLET | Refills: 3 | Status: SHIPPED | OUTPATIENT
Start: 2024-10-30

## 2024-10-30 NOTE — PROGRESS NOTES
ASSESMENT AND PLAN:  Diagnoses and all orders for this visit:  Acute heart failure, unspecified heart failure type (H)  Per hospital discharge summary a follow-up echo was recommended for November.  -     Echocardiogram Complete; Future  Pulmonary embolism, other, unspecified chronicity, unspecified whether acute cor pulmonale present (H)  Counseling done with the patient in detail.  He will need to be on the apixaban at least until February but I think now given his history of pulmonary embolism and stroke lifetime therapy might be a good idea, we had a initial discussion about this today and we will make a final decision in February.  -     apixaban ANTICOAGULANT (ELIQUIS) 5 MG tablet; Take 1 tablet (5 mg) by mouth 2 times daily.  Acute right MCA stroke (H)  Improving.  He will have a follow-up swallow study done through [facility].  -     atorvastatin (LIPITOR) 80 MG tablet; Take 1 tablet (80 mg) by mouth every evening.  Schizoaffective disorder, bipolar type (H)  His level of overthinking and racing thoughts and anxiety definitely seems worse off of the Invega.  I encouraged him to talk to his psychiatrist about this.  Need for vaccination  -     INFLUENZA VACCINE TRIVALENT(FLUBLOK)  -     COVID-19 12+ (PFIZER)  Encounter for medication refill  -     omeprazole (PRILOSEC) 20 MG DR capsule; Take 1 capsule (20 mg) by mouth daily. Per g tube  -     vitamin B1 (THIAMINE) 100 MG tablet; Take 1 tablet (100 mg) by mouth daily.      Reviewed the risks and benefits of the treatment plan with the patient and/or caregiver and we discussed indications for routine and emergent follow-up.        SUBJECTIVE: Patient does feel like his strength is improving.  He is still not taking much by mouth, most of his nutrition is coming through his feeding tube.  He is doing some swallowing exercises and has a follow-up swallow study scheduled.  Patient has a lot of questions about his blood thinners.  He is currently taking apixaban twice  daily.  He is tolerating it well, he has not been having any noticeable bleeding problems, no blood in the urine, no blood in the stool.    No past medical history on file.  Patient Active Problem List   Diagnosis    Bipolar affective disorder (H)    Primary insomnia    Alcohol abuse    Anxiety    Schizoaffective disorder, bipolar type (H)    Sinus tachycardia    Hypertriglyceridemia    Erectile dysfunction, unspecified erectile dysfunction type    Drug abuse (H)    Acute right MCA stroke (H)    Left hemiplegia (H)    Marijuana abuse    Alcohol dependence, uncomplicated (H)    Aspiration pneumonia, unspecified aspiration pneumonia type, unspecified laterality, unspecified part of lung (H)    Dysphagia, unspecified type    Prostate hypertrophy    Pulmonary embolism, other, unspecified chronicity, unspecified whether acute cor pulmonale present (H)     Current Outpatient Medications   Medication Sig Dispense Refill    acetaminophen (TYLENOL) 325 MG tablet Place 325-650 mg into G tube every 6 hours as needed for pain.      apixaban ANTICOAGULANT (ELIQUIS) 5 MG tablet Take 1 tablet (5 mg) by mouth 2 times daily. 180 tablet 3    atorvastatin (LIPITOR) 80 MG tablet Take 1 tablet (80 mg) by mouth every evening. 90 tablet 3    hydrOXYzine taylor (VISTARIL) 25 MG capsule Place 1 capsule (25 mg) into G tube 3 times daily as needed for anxiety. 60 capsule 1    lidocaine (XYLOCAINE) 5 % external ointment Apply topically 4 times daily as needed (skin pain at g tube site).      omeprazole (PRILOSEC) 20 MG DR capsule Take 1 capsule (20 mg) by mouth daily. Per g tube 90 capsule 3    QUEtiapine (SEROQUEL) 100 MG tablet Place 500 mg into G tube at bedtime.      QUEtiapine (SEROQUEL) 50 MG tablet Place 50 mg into G tube 2 times daily as needed (agitation).      senna-docusate (SENOKOT-S/PERICOLACE) 8.6-50 MG tablet 1 tablet by Enteral route 2 times daily as needed for constipation.      Valproate Sodium (VALPROIC ACID) 250 MG/5ML Place  "250 mg into G tube 2 times daily.      vitamin B1 (THIAMINE) 100 MG tablet Take 1 tablet (100 mg) by mouth daily. 90 tablet 3     History   Smoking Status    Former    Types: Cigarettes, Other   Smokeless Tobacco    Never       OBJECTICE: BP 92/65 (BP Location: Right arm, Patient Position: Right side, Cuff Size: Adult Regular)   Pulse 91   Temp 97.8  F (36.6  C) (Oral)   Resp 16   Ht 1.829 m (6' 0.01\")   Wt 69.4 kg (153 lb 1.3 oz)   SpO2 99%   BMI 20.76 kg/m       No results found for this or any previous visit (from the past 24 hours).  Psychiatric-appearance is well-groomed, speech is pressured, affect is anxious, thought content negative for suicidal or homicidal ideation.   CV-regular rate and rhythm   RESP-lungs clear   EXTREM-no pitting edema of the ankles          Signed Electronically by: Telly Arias MD    "

## 2024-11-29 ENCOUNTER — HOSPITAL ENCOUNTER (OUTPATIENT)
Dept: CARDIOLOGY | Facility: HOSPITAL | Age: 54
Discharge: HOME | End: 2024-11-29
Attending: FAMILY MEDICINE | Admitting: FAMILY MEDICINE
Payer: COMMERCIAL

## 2024-11-29 DIAGNOSIS — I50.9 ACUTE HEART FAILURE, UNSPECIFIED HEART FAILURE TYPE (H): ICD-10-CM

## 2024-11-29 LAB — LVEF ECHO: NORMAL

## 2024-11-29 PROCEDURE — 93306 TTE W/DOPPLER COMPLETE: CPT

## 2024-11-29 PROCEDURE — 93306 TTE W/DOPPLER COMPLETE: CPT | Mod: 26 | Performed by: INTERNAL MEDICINE

## 2024-12-12 ENCOUNTER — OFFICE VISIT (OUTPATIENT)
Dept: FAMILY MEDICINE | Facility: CLINIC | Age: 54
End: 2024-12-12
Payer: COMMERCIAL

## 2024-12-12 DIAGNOSIS — I63.511 ACUTE RIGHT MCA STROKE (H): ICD-10-CM

## 2024-12-12 DIAGNOSIS — R13.10 DYSPHAGIA, UNSPECIFIED TYPE: Primary | ICD-10-CM

## 2024-12-12 DIAGNOSIS — E78.1 HYPERTRIGLYCERIDEMIA: ICD-10-CM

## 2024-12-12 DIAGNOSIS — R19.7 DIARRHEA, UNSPECIFIED TYPE: ICD-10-CM

## 2024-12-12 DIAGNOSIS — I26.99 PULMONARY EMBOLISM, OTHER, UNSPECIFIED CHRONICITY, UNSPECIFIED WHETHER ACUTE COR PULMONALE PRESENT (H): ICD-10-CM

## 2024-12-12 NOTE — PROGRESS NOTES
ASSESMENT AND PLAN:  Diagnoses and all orders for this visit:  Dysphagia, unspecified type following acute right MCA stroke (H)  The patient's care has been fragmented, getting a lot of care through [facility] and regions.  I was able to use care everywhere to determine that his feeding tube was put in on 8/19/2024 at [facility].  Apparently it was recommended that the feeding to be changed in 3 months.  However, the patient is hopeful that his swallowing has improved to the point that he could have the feeding tube removed and will not need it exchanged.  Therefore, retrying to get him in urgently with speech therapy for an evaluation to determine whether his dysphagia has improved to the point where the feeding tube could be discontinued.  If so, then we will get him in with interventional radiology for removal of his feeding tube.  If not, then we will get him in for interventional radiology for exchange of the feeding tube.  Extensive counseling and chart review done with the patient and he is agreeable with the plan, he is meeting with our specialty  to get him in as soon as possible with speech therapy for the swallow evaluation.  -     Speech Therapy  Referral; Future    Diarrhea, unspecified type  Likely infectious.  I was able to review some of the records in care everywhere, C. difficile testing was negative.  He can continue to use antidiarrheal medication prescribed by the ER doctor at [facility].    Hypertriglyceridemia  Stable.  Possibly due for labs but I am checking to see what labs are done with [facility].  Continue atorvastatin.    Pulmonary embolism, other, unspecified chronicity, unspecified whether acute cor pulmonale present (H)  Reviewed echo results with the patient which showed resolution of his heart failure, the heart failure was likely secondary to the acute pulmonary embolism.  Patient reassured by this news.  Patient counseled that he should stay on  "apixaban indefinitely.    Reviewed the risks and benefits of the treatment plan with the patient and/or caregiver and we discussed indications for routine and emergent follow-up.  46 minutes spent on the date of the encounter doing chart review, patient visit and documentation and face to face counseling on above.        SUBJECTIVE: Patient reports that he was in the emergency room for diarrhea.  The diarrhea has subsequently improved.  No blood in the stool.  He reports that his feeding tube has been working properly but that at times it seems to get dark in color.  He is wondering if there could be a correlation between the diarrheal illness and the changes in the feeding tube.  His feeding tube was placed after he had a stroke.  We were able to go back in care everywhere and review this in detail, he was hospitalized back in August, feeding tube was placed 8/19/2024.  The recommendation was that the feeding to be exchanged in 3 months.  This has not yet occurred, he still has not his original feeding tube.  He reports that his swallowing function has been improving.  He has not had any recent formal speech swallow therapy evaluation.  He reports that sometimes he \"sneaks\" liquids and does not cough or spider afterwards.  Most of his nutrition is still being given by his feeding tube.    No past medical history on file.  Patient Active Problem List   Diagnosis    Bipolar affective disorder (H)    Primary insomnia    Alcohol abuse    Anxiety    Schizoaffective disorder, bipolar type (H)    Sinus tachycardia    Hypertriglyceridemia    Erectile dysfunction, unspecified erectile dysfunction type    Drug abuse (H)    Acute right MCA stroke (H)    Left hemiplegia (H)    Marijuana abuse    Alcohol dependence, uncomplicated (H)    Aspiration pneumonia, unspecified aspiration pneumonia type, unspecified laterality, unspecified part of lung (H)    Dysphagia, unspecified type    Prostate hypertrophy    Pulmonary embolism, " other, unspecified chronicity, unspecified whether acute cor pulmonale present (H)     Current Outpatient Medications   Medication Sig Dispense Refill    acetaminophen (TYLENOL) 325 MG tablet Place 325-650 mg into G tube every 6 hours as needed for pain.      apixaban ANTICOAGULANT (ELIQUIS) 5 MG tablet Take 1 tablet (5 mg) by mouth 2 times daily. 180 tablet 3    atorvastatin (LIPITOR) 80 MG tablet Take 1 tablet (80 mg) by mouth every evening. 90 tablet 3    hydrOXYzine taylor (VISTARIL) 25 MG capsule Place 1 capsule (25 mg) into G tube 3 times daily as needed for anxiety. 60 capsule 1    lidocaine (XYLOCAINE) 5 % external ointment Apply topically 4 times daily as needed (skin pain at g tube site).      omeprazole (PRILOSEC) 20 MG DR capsule Take 1 capsule (20 mg) by mouth daily. Per g tube 90 capsule 3    QUEtiapine (SEROQUEL) 100 MG tablet Place 500 mg into G tube at bedtime.      QUEtiapine (SEROQUEL) 50 MG tablet Place 50 mg into G tube 2 times daily as needed (agitation).      senna-docusate (SENOKOT-S/PERICOLACE) 8.6-50 MG tablet 1 tablet by Enteral route 2 times daily as needed for constipation.      Valproate Sodium (VALPROIC ACID) 250 MG/5ML Place 250 mg into G tube 2 times daily.      vitamin B1 (THIAMINE) 100 MG tablet Take 1 tablet (100 mg) by mouth daily. 90 tablet 3     History   Smoking Status    Former    Types: Cigarettes, Other   Smokeless Tobacco    Never       OBJECTICE: There were no vitals taken for this visit.     No results found for this or any previous visit (from the past 24 hours).     GEN-alert, in no acute distress   HEENT-mucous membranes are moist   CV-regular rate and rhythm   RESP-lungs clear   ABDOMINAL-soft and nontender to palpation, G-tube in place   SKIN-G-tube site looks clear      Telly Arias MD

## 2024-12-17 ENCOUNTER — THERAPY VISIT (OUTPATIENT)
Dept: SPEECH THERAPY | Facility: REHABILITATION | Age: 54
End: 2024-12-17
Attending: FAMILY MEDICINE
Payer: COMMERCIAL

## 2024-12-17 DIAGNOSIS — R13.10 DYSPHAGIA, UNSPECIFIED TYPE: ICD-10-CM

## 2024-12-17 DIAGNOSIS — R13.12 OROPHARYNGEAL DYSPHAGIA: Primary | ICD-10-CM

## 2024-12-17 PROCEDURE — 92610 EVALUATE SWALLOWING FUNCTION: CPT | Mod: GN | Performed by: SPEECH-LANGUAGE PATHOLOGIST

## 2024-12-17 NOTE — PROGRESS NOTES
"SPEECH LANGUAGE PATHOLOGY EVALUATION     Fall Risk Screen:  Fall screen completed by: SLP  Have you fallen 2 or more times in the past year?: Yes (illness related per patient/caregiver)  Have you fallen and had an injury in the past year?: No  Is patient a fall risk?: No    Subjective        Presenting condition or subjective complaint:  Patient is a 54 year old male who presents to evaluation with dysphagia concerns.    Patient with a pertinent medical history of CVA and aspiration PNA.  He has been seen by speech therapy in 2022 and more recently in August 2024 addressing dysphagia and cognitive concerns.    Per most recent VFSS on 8/16/24, \"Across all trials today: Significant pooling/residue in the vallecula and pyriform sinuses. Swallow triggered at level of the pyriform sinuses and minimal pharyngeal constriction/stripping wave observed.. Further details regarding various textures as follows:  - Moderately thick liquids via cup sip (IDDSI 3):  On consecutive swallows, penetration of residuals in pharynx occurs.  - Mildly thick liquids via cup sip (IDDSI 2): SILENT aspiration occurs. Cued cough unsuccessful at ejecting aspirated material from the airway or clearing residue within the pharynx.  - Puree via tsp (IDDSI 4): flash penetration of residuals occurs     Based on today's assessment, SLP makes the following recommendations:  1. Food Consistency: NPO with exception of trials of pureed textures with SLP only (<4 oz/day).  2. Liquid Consistency: NPO  3. Medication administration: Non-oral medications only  4. Ongoing SLP treatment: Yes  5. Future/Repeat MBSS: Yes in 4-6 weeks (no sooner than 9/13/2024)  6. Free water protocol: Not appropriate at this time  7. GI recommendation/consult: not directly indicated at this time, although patient reports long history of GI issues. \"    Per most recent medical records available, patient discharged from ARU with the following recommendations:   \"Recommendations are as " "follows:  - Patient must be able to use a strong cough to clear secretions/wet vocal quality before he is able to consume PO. (SLP explained that as PEG site is healing, eliciting a strong cough has been uncomfortable for patient).  - Patient can have up to 4 oz of pureed (IDDSI 4) textures per day and must be using effortful swallow x2 with each bite.  - Continue with good oral care and following remainder of pharyngeal strengthening exercises\"    On today's date, he is accompanied to the evaluation by his cousin/PCA.  Per their report, he receives full hydration/nutrition via tube feeding with 2-4oz pureed solids per day with effortful swallow.  He has been inconsistent with completion of home swallowing exercises.  Date of onset: 12/12/24 (order date)    Relevant medical history:   See EMR  Dates & types of surgery:    PAST SURGICAL HISTORY:   Past Surgical History:   Procedure Laterality Date    IR CAROTID CEREBRAL ANGIOGRAM BILATERAL  2/25/2022       Prior diagnostic imaging/testing results:     Prior VFSS & Esophagram, most recently on 8/5/24 & 8/16/24  Prior therapy history for the same diagnosis, illness or injury:    Yes    Living Environment  Social support:     Help at home:  Cousin/PCA  Equipment owned:       Employment:      Hobbies/Interests:      Patient goals for therapy:  To resume oral intake    Pain assessment: No specific pain reported during the evaluation     Objective     SWALLOW EVALUTION  Dysphagia history: See above  Current Diet/Method of Nutritional Intake:  Feeding tube with 2oz-4oz puree solids per day         CLINICAL SWALLOW EVALUATION  Oral Motor Function: generally intact  Dentition: adequate dentition  Secretion management: WFL  Mucosal quality: good  Mandibular function: intact  Oral labial function: WFL  Lingual function: impaired protrusion, impaired coordination     Level of assist required for feeding: set up only required   Textures Trialed:   Clinical Swallow Eval: Thin " Liquids  Mode of presentation: spoon, fed by clinician   Volume presented: x2 ice chips  Preparatory Phase: WFL  Oral Phase: WFL  Pharyngeal phase of swallow:  delayed intentional cough    Strategies trialed during procedure: SIABEL SLP CLINICAL EVAL STRATEGIES: NA    Diagnostic statement: No immediate overt s/s aspiration, although known history of silent aspiration.  Patient did demonstrate mild intention delayed cough after initial sip.  Clear vocal quality post intake.    Clinical Swallow Eval: Mildly Thick Liquids  Mode of presentation: spoon, fed by clinician   Volume presented: 2 sips  Preparatory Phase: WFL  Oral Phase: WFL  Pharyngeal phase of swallow: intact   Strategies trialed during procedure: ISABEL SLP CLINICAL EVAL STRATEGIES: NA    Diagnostic statement: No immediate overt s/s aspiration, although known history of silent aspiration.  Clear vocal quality post intake.    Clinical Swallow Eval: Purees  Mode of presentation: spoon, fed by clinician   Volume presented: 1oz  Preparatory Phase: WFL  Oral Phase: WFL  Pharyngeal phase of swallow: intact, Utilized effortful swallow across all trials    Strategies trialed during procedure: ISABEL SLP CLINICAL EVAL STRATEGIES: NA    Diagnostic statement: No immediate overt s/s aspiration, although known history of silent aspiration.  Clear vocal quality post intake.     Limited PO trials given shortened evaluation.    ESOPHAGEAL PHASE OF SWALLOW  no observed or reported concerns related to esophageal function     SWALLOW ASSESSMENT CLINICAL IMPRESSIONS AND RATIONALE  Diet Consistency Recommendations:  Continue current diet with hydration/nutrition via tube feeding with up to 4oz daily puree.     Recommended Feeding/Eating Techniques: supervision needed, small bolus size, slow rate of intake, effortful (hard) swallow   Medication Administration Recommendations: Feeding tube  Instrumental Assessment Recommendations: VFSS (videofluoroscopic swallowing study) is recommended  to reassess safety and efficiency of current swallow function prior to diet advancement/changes given history of silent aspiration.    Assessment & Plan   CLINICAL IMPRESSIONS   Medical Diagnosis: Dysphagia, unspecified type (R13.10)  - Primary    Treatment Diagnosis: Oropharyngeal dysphagia   Impression/Assessment: Pt is a 54 year old male with dysphagia complaints. Patient with a known dysphagia history s/p CVA, with most recent diet recommendations for hydration/nutrition via tube feeding, and up to 4oz puree solids per day.  Per clinical swallow evaluation, patient presents with no overt s/s aspiration across limited trials, although given known history of silent aspiration, VFSS is recommended to visualize safety and efficiency of swallow function to determine diet recommendations. Identified deficits interfere with their ability to consume an oral diet and maintain nutrition as compared to previous level of function.    PLAN OF CARE  Treatment Interventions: Swallowing dysfunction and/or oral function for feeding    Prognosis to achieve stated therapy goals is fair   Rehab potential is impacted by: current level of function, family/caregiver support, patient awareness of deficits, patient motivation, prior level of function    Long Term Goals:   SLP Goal 1  Goal Identifier: VFSS  Goal Description: Patient will participate in repeat VFSS to reassess the safety and efficiency of current swallow function to aid in diet recommendations and goal planning.  Rationale: To maximize safety, ease and/or independence of oral intake  Target Date: 03/17/25      Frequency of Treatment: weekly  Duration of Treatment: 90 days (pending VFSS results)     Recommended Referrals to Other Professionals:  NA  Education Assessment:   Learner/Method: Patient;Caregiver;Demonstration;Listening  Education Comments: Swallow structure/function, aspiration vs silent aspiration and concern of related illness, referrals/VFSS    Risks and  benefits of evaluation/treatment have been explained.   Patient/Family/caregiver agrees with Plan of Care.     Evaluation Time:    SLP Eval: oral/pharyngeal swallow function, clinical minutes (01757): 27 (shortened due to late arrival)    Signing Clinician: NIRALI Hawthorne      [facility]                                                                                   OUTPATIENT SPEECH LANGUAGE PATHOLOGY      PLAN OF TREATMENT FOR OUTPATIENT REHABILITATION   Patient's Last Name, First Name, Tripp Arredondo YOB: 1970   Provider's Name   [facility]   Medical Record No.  [id number]     Onset Date: 12/12/24 (order date) Start of Care Date: 12/17/24     Medical Diagnosis:  Dysphagia, unspecified type (R13.10)  - Primary      SLP Treatment Diagnosis: Oropharyngeal dysphagia  Plan of Treatment  Frequency/Duration: weekly  / 90 days (pending VFSS results)     Certification date from 12/17/24   To 03/17/25          See note for plan of treatment details and functional goals     Dafne Watt, SLP                         I CERTIFY THE NEED FOR THESE SERVICES FURNISHED UNDER        THIS PLAN OF TREATMENT AND WHILE UNDER MY CARE     (Physician attestation of this document indicates review and certification of the therapy plan).              Referring Provider:  Telly Arias    Initial Assessment  See Epic Evaluation- 12/17/24

## 2025-01-03 ENCOUNTER — HOSPITAL ENCOUNTER (OUTPATIENT)
Dept: RADIOLOGY | Facility: CLINIC | Age: 55
Discharge: HOME | End: 2025-01-03
Attending: FAMILY MEDICINE
Payer: COMMERCIAL

## 2025-01-03 DIAGNOSIS — R13.12 OROPHARYNGEAL DYSPHAGIA: ICD-10-CM

## 2025-01-03 PROCEDURE — 74230 X-RAY XM SWLNG FUNCJ C+: CPT

## 2025-01-03 RX ORDER — BARIUM SULFATE 400 MG/ML
SUSPENSION ORAL ONCE
Status: COMPLETED | OUTPATIENT
Start: 2025-01-03 | End: 2025-01-03

## 2025-01-03 RX ADMIN — BARIUM SULFATE: 400 SUSPENSION ORAL at 09:50

## 2025-01-06 DIAGNOSIS — R13.10 DYSPHAGIA, UNSPECIFIED TYPE: Primary | ICD-10-CM

## 2025-01-07 ENCOUNTER — TELEPHONE (OUTPATIENT)
Dept: FAMILY MEDICINE | Facility: CLINIC | Age: 55
End: 2025-01-07

## 2025-01-07 NOTE — TELEPHONE ENCOUNTER
----- Message from Telly Arias sent at 1/6/2025  6:23 PM CST -----  Specialty scheduling team, please make sure that someone calls the patient to schedule his appointment with interventional radiology to remove his feeding tube since he passed his swallow study.  Patient has been informed and is awaiting the call to schedule the feeding tube removal.  Thank you.

## 2025-01-10 NOTE — TELEPHONE ENCOUNTER
Appt scheduled for:     1/15/2025 3:00 PM (Arrive by 2:00 PM) [facility] RADIOLOGY NURSE; [address] Interventional Radiology

## 2025-01-15 ENCOUNTER — HOSPITAL ENCOUNTER (OUTPATIENT)
Dept: INTERVENTIONAL RADIOLOGY/VASCULAR | Facility: CLINIC | Age: 55
Discharge: HOME | End: 2025-01-15
Attending: FAMILY MEDICINE
Payer: COMMERCIAL

## 2025-01-15 DIAGNOSIS — R13.10 DYSPHAGIA, UNSPECIFIED TYPE: ICD-10-CM

## 2025-01-15 NOTE — DISCHARGE INSTRUCTIONS
Gastrostomy (G) / Gastrojejunostomy (GJ) Tube Removal Discharge Instructions:  Please follow the below instructions following the removal of your G/GJ tube    Care Instructions after tube removal:  - OK to shower after removal of your G/GJ tube.  - Avoid taking a tub bath, going in a Jacuzzi and/or pool for at least 3 days.  - Keep previous tube exit site covered with gauze and tape dressing if you have drainage from site. Change dressings as needed to keep site dry and clean.  - Expect drainage will stop in approximately 3 days time.  - Do not eat or drink anything for 2 hours following the removal of your tube.    Please call [facility] RN Line at [phone] if:  - Persistent drainage lasting longer than three days.  - Fevers (greater than 101 F (38.3C)), chills, severe pain at site, or redness at exit site.

## 2025-01-15 NOTE — IR NOTE
Patient Name: Tripp Sanders  Medical Record Number: [id number]  Today's Date: 1/15/2025    Procedure: Gastrostomy Tube Removal  Sedation medications administered: none       Other Notes: Pt arrived to IR room 1 from Radiology waiting room. Gastrostomy tube removal completed per order. Patient anxious about site healing on its own, Dr. Cassidy did assess site and confirmed with patient and patients care provider that site should heal on its own. Gauze and tape applied. Discharge instructions reviewed with patient and care provider. AVS provided.

## 2025-01-21 ENCOUNTER — TELEPHONE (OUTPATIENT)
Dept: SPEECH THERAPY | Facility: REHABILITATION | Age: 55
End: 2025-01-21

## 2025-01-21 NOTE — TELEPHONE ENCOUNTER
Called patient and his caregiver regarding no show for speech therapy appointment on today's date.  They unfortunately had a conflicting appointment and was unable to make it.  Rescheduled to Thursday 1/23/25 at 9:45.    Danfe Watt M.S. CCC-SLP

## 2025-01-23 ENCOUNTER — THERAPY VISIT (OUTPATIENT)
Dept: SPEECH THERAPY | Facility: REHABILITATION | Age: 55
End: 2025-01-23
Payer: COMMERCIAL

## 2025-01-23 DIAGNOSIS — R13.12 OROPHARYNGEAL DYSPHAGIA: Primary | ICD-10-CM

## 2025-02-04 NOTE — TELEPHONE ENCOUNTER
Clinic RN: Please investigate patient's chart or contact patient if the information cannot be found because the medication is listed as historical or discontinued. Confirm patient is taking this medication. Document findings and route refill encounter to provider for approval or denial.    Ophelia Al RN  [facility]

## 2025-02-06 NOTE — TELEPHONE ENCOUNTER
Writer attempt # 1 to call patient regarding Refill RN's message below. No answer,did not leave VM, as writer unsure if VM was secure.    If patient returns call back, please review Refill RN's message with patient. Obtain patient responses, and route to the prescribing provider as needed. Thanks!    Bradley Patton, LLUVIAN, RN, N   [facility]

## 2025-02-10 RX ORDER — TAMSULOSIN HYDROCHLORIDE 0.4 MG/1
0.4 CAPSULE ORAL AT BEDTIME
Qty: 90 CAPSULE | Refills: 0 | OUTPATIENT
Start: 2025-02-10

## 2025-02-10 NOTE — TELEPHONE ENCOUNTER
2nd attempt to call patient. Left message to call clinic back.       Rj Lu, BSN RN  [facility]

## 2025-02-10 NOTE — TELEPHONE ENCOUNTER
Patient calling clinic back and has an appointment scheduled on 2/12/25 with PCP, Dr Arias .  Patient would like to discuss side effects for Flomax. Doesn't want a refill at this time.     Silvia Casillas RN  [facility]

## 2025-02-12 ENCOUNTER — OFFICE VISIT (OUTPATIENT)
Dept: FAMILY MEDICINE | Facility: CLINIC | Age: 55
End: 2025-02-12
Payer: COMMERCIAL

## 2025-02-12 VITALS
RESPIRATION RATE: 16 BRPM | BODY MASS INDEX: 18.96 KG/M2 | DIASTOLIC BLOOD PRESSURE: 70 MMHG | HEART RATE: 69 BPM | SYSTOLIC BLOOD PRESSURE: 101 MMHG | HEIGHT: 72 IN | WEIGHT: 140 LBS | TEMPERATURE: 97.5 F | OXYGEN SATURATION: 100 %

## 2025-02-12 DIAGNOSIS — F10.20 ALCOHOL DEPENDENCE, UNCOMPLICATED (H): ICD-10-CM

## 2025-02-12 DIAGNOSIS — J69.0 ASPIRATION PNEUMONIA, UNSPECIFIED ASPIRATION PNEUMONIA TYPE, UNSPECIFIED LATERALITY, UNSPECIFIED PART OF LUNG (H): ICD-10-CM

## 2025-02-12 DIAGNOSIS — F25.0 SCHIZOAFFECTIVE DISORDER, BIPOLAR TYPE (H): Primary | ICD-10-CM

## 2025-02-12 DIAGNOSIS — R73.03 PREDIABETES: ICD-10-CM

## 2025-02-12 DIAGNOSIS — E78.1 HYPERTRIGLYCERIDEMIA: ICD-10-CM

## 2025-02-12 DIAGNOSIS — Z11.3 SCREEN FOR STD (SEXUALLY TRANSMITTED DISEASE): ICD-10-CM

## 2025-02-12 DIAGNOSIS — N52.9 ERECTILE DYSFUNCTION, UNSPECIFIED ERECTILE DYSFUNCTION TYPE: ICD-10-CM

## 2025-02-12 DIAGNOSIS — R73.9 HYPERGLYCEMIA: ICD-10-CM

## 2025-02-12 DIAGNOSIS — F41.9 ANXIETY: ICD-10-CM

## 2025-02-12 LAB
EST. AVERAGE GLUCOSE BLD GHB EST-MCNC: 103 MG/DL
HBA1C MFR BLD: 5.2 % (ref 0–5.6)

## 2025-02-12 RX ORDER — HYDROXYZINE PAMOATE 50 MG/1
50-100 CAPSULE ORAL
Qty: 60 CAPSULE | Refills: 6 | Status: SHIPPED | OUTPATIENT
Start: 2025-02-12

## 2025-02-12 ASSESSMENT — PATIENT HEALTH QUESTIONNAIRE - PHQ9: SUM OF ALL RESPONSES TO PHQ QUESTIONS 1-9: 12

## 2025-02-12 NOTE — PROGRESS NOTES
ASSESMENT AND PLAN:  Diagnoses and all orders for this visit:  Schizoaffective disorder, bipolar type (H)  Off medication for the last few days as detailed below.  He has an upcoming appointment with psychiatry on Monday and I strongly encouraged him to make sure he attends that appointment and discuss an alternative medication if he is no longer willing to take the quetiapine.  Patient had done well with Invega in the past.  Anxiety  Will increase his dose of hydroxyzine at bedtime to see if this can help with his anxious thinking at bedtime and help him get better sleep.  Other plans per psychiatry as detailed above.  -     hydrOXYzine taylor (VISTARIL) 50 MG capsule; Take 1-2 capsules ( mg) by mouth nightly as needed for anxiety.  History of aspiration pneumonia, unspecified aspiration pneumonia type, unspecified laterality, unspecified part of lung (H)  His most recent swallow study was negative for aspiration and he has successfully had his feeding tube removed and is tolerating oral feeding well.  Screen for STD (sexually transmitted disease)  -     Treponema Abs w Reflex to RPR and Titer; Future  -     HIV Antigen Antibody Combo; Future  Erectile dysfunction, unspecified erectile dysfunction type  -     Hemoglobin A1c; Future  Prediabetes versus other hyperglycemia  -     Hemoglobin A1c; Future  -     TSH with free T4 reflex; Future  Alcohol dependence, uncomplicated (H)  Counseling done with the patient.  Congratulated on his almost 2 months of sobriety.  Continue thiamine.  Hypertriglyceridemia  -     Lipid panel reflex to direct LDL Fasting; Future      Reviewed the risks and benefits of the treatment plan with the patient and/or caregiver and we discussed indications for routine and emergent follow-up.  The longitudinal plan of care for the diagnosis(es)/condition(s) as documented were addressed during this visit. Due to the added complexity in care, I will continue to support Tripp in the subsequent  management and with ongoing continuity of care.        SUBJECTIVE: Patient reports that he stopped taking all of his quetiapine several days ago because he was noticing some abnormal movements of his facial muscles and was worried that this could be a side effect that he had been warned about with this medication.  Since stopping the quetiapine he has been having some pressured speech and racing thoughts but no hallucinations.  In the past the patient had been well-managed with Invega but reports that he does not want to restart that because it made him too tired.  He is wondering if he can take a higher dose of hydroxyzine at bedtime, currently he is just taking 25 to 50 mg and it does give him some improvement in his anxious thinking at bedtime and helps him sleep but he feels like he needs additional help with this.  Past history of alcohol dependence, his last alcohol use was  when he had 2 alcoholic drinks on [place].  Patient has noticed some improvement in ED since stopping the quetiapine but is still worried about this problem and wants to have some additional lab testing done.    No past medical history on file.  Patient Active Problem List   Diagnosis    Bipolar affective disorder (H)    Primary insomnia    Alcohol abuse    Anxiety    Schizoaffective disorder, bipolar type (H)    Sinus tachycardia    Hypertriglyceridemia    Erectile dysfunction, unspecified erectile dysfunction type    Drug abuse (H)    Acute right MCA stroke (H)    Left hemiplegia (H)    Marijuana abuse    Alcohol dependence, uncomplicated (H)    Aspiration pneumonia, unspecified aspiration pneumonia type, unspecified laterality, unspecified part of lung (H)    Dysphagia, unspecified type    Prostate hypertrophy    Pulmonary embolism, other, unspecified chronicity, unspecified whether acute cor pulmonale present (H)     Current Outpatient Medications   Medication Sig Dispense Refill    acetaminophen (TYLENOL) 325 MG tablet Place  325-650 mg into G tube every 6 hours as needed for pain.      apixaban ANTICOAGULANT (ELIQUIS) 5 MG tablet Take 1 tablet (5 mg) by mouth 2 times daily. 180 tablet 3    atorvastatin (LIPITOR) 80 MG tablet Take 1 tablet (80 mg) by mouth every evening. 90 tablet 3    hydrOXYzine taylor (VISTARIL) 50 MG capsule Take 1-2 capsules ( mg) by mouth nightly as needed for anxiety. 60 capsule 6    lidocaine (XYLOCAINE) 5 % external ointment Apply topically 4 times daily as needed (skin pain at g tube site).      omeprazole (PRILOSEC) 20 MG DR capsule Take 1 capsule (20 mg) by mouth daily. Per g tube 90 capsule 3    QUEtiapine (SEROQUEL) 100 MG tablet Place 500 mg into G tube at bedtime.      QUEtiapine (SEROQUEL) 50 MG tablet Place 50 mg into G tube 2 times daily as needed (agitation).      senna-docusate (SENOKOT-S/PERICOLACE) 8.6-50 MG tablet 1 tablet by Enteral route 2 times daily as needed for constipation.      Valproate Sodium (VALPROIC ACID) 250 MG/5ML Place 250 mg into G tube 2 times daily.      vitamin B1 (THIAMINE) 100 MG tablet Take 1 tablet (100 mg) by mouth daily. 90 tablet 3     History   Smoking Status    Former    Types: Cigarettes, Other   Smokeless Tobacco    Never       OBJECTICE: /70   Pulse 69   Temp 97.5  F (36.4  C) (Temporal)   Resp 16   Ht 1.829 m (6')   Wt 63.5 kg (140 lb)   SpO2 100%   BMI 18.99 kg/m       Recent Results (from the past 24 hours)   Hemoglobin A1c    Collection Time: 02/12/25  1:00 PM   Result Value Ref Range    Estimated Average Glucose 103 <117 mg/dL    Hemoglobin A1C 5.2 0.0 - 5.6 %        Psychiatric-appearance is well-groomed, speech is pressured, affect is anxious, thought content negative for suicidal or homicidal ideation, thought processing negative for paranoid or delusional thinking.   CV-regular rate and rhythm   ABDOMINAL-soft and nontender   SKIN-site of his feeding tube removal appears to be healing well, no signs of surrounding infection        Signed  Electronically by: Telly Arias MD

## 2025-02-13 LAB
CHOLEST SERPL-MCNC: 97 MG/DL
HDLC SERPL-MCNC: 55 MG/DL
HIV 1+2 AB+HIV1 P24 AG SERPL QL IA: NONREACTIVE
LDLC SERPL CALC-MCNC: 29 MG/DL
NONHDLC SERPL-MCNC: 42 MG/DL
T PALLIDUM AB SER QL: NONREACTIVE
TRIGL SERPL-MCNC: 65 MG/DL
TSH SERPL DL<=0.005 MIU/L-ACNC: 1.83 UIU/ML (ref 0.3–4.2)

## 2025-02-26 ENCOUNTER — TELEPHONE (OUTPATIENT)
Dept: PHARMACY | Facility: OTHER | Age: 55
End: 2025-02-26
Payer: COMMERCIAL

## 2025-02-26 NOTE — TELEPHONE ENCOUNTER
MTM Recruitment: [facility] insurance     Referral outreach attempt #1 on February 26, 2025      Outcome: call attempted, could not leave voicemail - vm full / no miquel Watson CMA  [facility]

## 2025-03-03 DIAGNOSIS — F41.9 ANXIETY: ICD-10-CM

## 2025-03-03 RX ORDER — HYDROXYZINE PAMOATE 50 MG/1
50-100 CAPSULE ORAL
Qty: 60 CAPSULE | Refills: 6 | Status: CANCELLED | OUTPATIENT
Start: 2025-03-03

## 2025-03-03 NOTE — TELEPHONE ENCOUNTER
Call from patient regarding request documented below  Advised patient medication increase/decrease requires a discussion w/ clinician so can be discussed further at 3/12 visit  Patient verbalized understanding

## 2025-03-03 NOTE — TELEPHONE ENCOUNTER
It was difficult to understand and hear patient but patient called requesting if PCP would prescribed the medication for the afternoon time as well.

## 2025-03-12 ENCOUNTER — OFFICE VISIT (OUTPATIENT)
Dept: FAMILY MEDICINE | Facility: CLINIC | Age: 55
End: 2025-03-12
Payer: COMMERCIAL

## 2025-03-12 VITALS
HEART RATE: 89 BPM | TEMPERATURE: 98.5 F | DIASTOLIC BLOOD PRESSURE: 70 MMHG | WEIGHT: 148.08 LBS | RESPIRATION RATE: 20 BRPM | HEIGHT: 72 IN | BODY MASS INDEX: 20.06 KG/M2 | OXYGEN SATURATION: 94 % | SYSTOLIC BLOOD PRESSURE: 114 MMHG

## 2025-03-12 DIAGNOSIS — F25.0 SCHIZOAFFECTIVE DISORDER, BIPOLAR TYPE (H): Primary | ICD-10-CM

## 2025-03-12 DIAGNOSIS — G81.94 LEFT HEMIPLEGIA (H): ICD-10-CM

## 2025-03-12 DIAGNOSIS — I50.22 CHRONIC SYSTOLIC CONGESTIVE HEART FAILURE (H): ICD-10-CM

## 2025-03-12 DIAGNOSIS — Z23 NEED FOR VACCINATION: ICD-10-CM

## 2025-03-12 DIAGNOSIS — N52.9 ERECTILE DYSFUNCTION, UNSPECIFIED ERECTILE DYSFUNCTION TYPE: ICD-10-CM

## 2025-03-12 DIAGNOSIS — F41.9 ANXIETY: ICD-10-CM

## 2025-03-12 PROBLEM — F19.10 DRUG ABUSE (H): Status: RESOLVED | Noted: 2022-02-14 | Resolved: 2025-03-12

## 2025-03-12 PROCEDURE — 99214 OFFICE O/P EST MOD 30 MIN: CPT | Performed by: FAMILY MEDICINE

## 2025-03-12 PROCEDURE — G2211 COMPLEX E/M VISIT ADD ON: HCPCS | Performed by: FAMILY MEDICINE

## 2025-03-12 PROCEDURE — 3078F DIAST BP <80 MM HG: CPT | Performed by: FAMILY MEDICINE

## 2025-03-12 PROCEDURE — 90746 HEPB VACCINE 3 DOSE ADULT IM: CPT | Performed by: FAMILY MEDICINE

## 2025-03-12 PROCEDURE — G0010 ADMIN HEPATITIS B VACCINE: HCPCS | Performed by: FAMILY MEDICINE

## 2025-03-12 PROCEDURE — 3074F SYST BP LT 130 MM HG: CPT | Performed by: FAMILY MEDICINE

## 2025-03-12 RX ORDER — HYDROXYZINE PAMOATE 50 MG/1
100 CAPSULE ORAL 2 TIMES DAILY
Qty: 120 CAPSULE | Refills: 11 | Status: SHIPPED | OUTPATIENT
Start: 2025-03-12

## 2025-03-12 RX ORDER — QUETIAPINE FUMARATE 100 MG/1
400 TABLET, FILM COATED ORAL AT BEDTIME
COMMUNITY
Start: 2025-03-12

## 2025-03-12 NOTE — PROGRESS NOTES
ASSESMENT AND PLAN:  Diagnoses and all orders for this visit:  Schizoaffective disorder, bipolar type (H)  Current exacerbation.  Currently hypomanic.  Obsessive thinking about erectile dysfunction as detailed below.  I think it would be best for the patient to go back on injectable Invega, he did much better on this.  He is suspicious of the injection.  Encouraged him to talk about this with his psychiatrist.  I did give my card to the patient and his caregiver so that they can share that with his psychiatrist so that hopefully he can get a release signed so that I can get the psychiatric consultation notes.  Anxiety  Increase hydroxyzine:  -     hydrOXYzine (VISTARIL) 50 MG capsule; Take 2 capsules (100 mg) by mouth 2 times daily.  Erectile dysfunction, unspecified erectile dysfunction type  Counseling done with the patient on the differential diagnosis, including his recent normal lab testing for diabetes and thyroid disorder.  Left hemiplegia (H)  Stable.  Secondary to his previous stroke.  He does have serious mobility limitations related to this and I did fill out the disability parking pass forms for the patient today.  Chronic systolic congestive heart failure (H)  Stable.  Need for vaccination  -     HEPATITIS B, ADULT 20+ (ENGERIX-B/RECOMBIVAX HB)      Reviewed the risks and benefits of the treatment plan with the patient and/or caregiver and we discussed indications for routine and emergent follow-up.  The longitudinal plan of care for the diagnosis(es)/condition(s) as documented were addressed during this visit. Due to the added complexity in care, I will continue to support Tripp in the subsequent management and with ongoing continuity of care.        SUBJECTIVE:schizoaffective disorder (Patient is showing signs of dementia, he is concerned because his mother has dementia and would like to get tested to rule it out. He explains that his sex drive and anxiety has increased.  ), Handicap Parking   (Patient would also like to get a handicap sticker to help with parking. ), Consult (Naye would like to see if there is a way to get in contact with his Psych provider so the medications or care do not counteract with each other. ), and Medication Request (Patient would like to discuss getting a prescription for hydrOXYzine taylor (VISTARIL) 50 MG capsule. He wants the exact same dose, same exact prescription just for noon time use because his anxiety gets worse around noon to bedtime /)  Patient reports that he has at times not been taking his Seroquel because he worries that it is causing erectile dysfunction.  He repeatedly talks about his erectile dysfunction and has a difficult time tracking conversation on other topics.  No past medical history on file.  Patient Active Problem List   Diagnosis    Bipolar affective disorder (H)    Primary insomnia    Alcohol abuse    Anxiety    Schizoaffective disorder, bipolar type (H)    Sinus tachycardia    Hypertriglyceridemia    Erectile dysfunction, unspecified erectile dysfunction type    Acute right MCA stroke (H)    Left hemiplegia (H)    Marijuana abuse    Alcohol dependence, uncomplicated (H)    Aspiration pneumonia, unspecified aspiration pneumonia type, unspecified laterality, unspecified part of lung (H)    Dysphagia, unspecified type    Prostate hypertrophy    Pulmonary embolism, other, unspecified chronicity, unspecified whether acute cor pulmonale present (H)    Chronic systolic congestive heart failure (H)     Current Outpatient Medications   Medication Sig Dispense Refill    acetaminophen (TYLENOL) 325 MG tablet Place 325-650 mg into G tube every 6 hours as needed for pain.      apixaban ANTICOAGULANT (ELIQUIS) 5 MG tablet Take 1 tablet (5 mg) by mouth 2 times daily. 180 tablet 3    atorvastatin (LIPITOR) 80 MG tablet Take 1 tablet (80 mg) by mouth every evening. 90 tablet 3    hydrOXYzine (VISTARIL) 50 MG capsule Take 2 capsules (100 mg) by mouth 2 times  daily. 120 capsule 11    lidocaine (XYLOCAINE) 5 % external ointment Apply topically 4 times daily as needed (skin pain at g tube site).      omeprazole (PRILOSEC) 20 MG DR capsule Take 1 capsule (20 mg) by mouth daily. Per g tube 90 capsule 3    QUEtiapine (SEROQUEL) 100 MG tablet Take 4 tablets (400 mg) by mouth at bedtime.      senna-docusate (SENOKOT-S/PERICOLACE) 8.6-50 MG tablet 1 tablet by Enteral route 2 times daily as needed for constipation.      Valproate Sodium (VALPROIC ACID) 250 MG/5ML Place 250 mg into G tube 2 times daily.      vitamin B1 (THIAMINE) 100 MG tablet Take 1 tablet (100 mg) by mouth daily. 90 tablet 3     History   Smoking Status    Former    Types: Cigarettes, Other   Smokeless Tobacco    Never       OBJECTICE: /70   Pulse 89   Temp 98.5  F (36.9  C) (Oral)   Resp 20   Ht 1.829 m (6')   Wt 67.2 kg (148 lb 1.3 oz)   SpO2 94%   BMI 20.08 kg/m       No results found for this or any previous visit (from the past 24 hours).     Psychiatric-appearance is somewhat disheveled, speech is pressured, affect is anxious, thought content negative for suicidal or homicidal ideation, thought processing is positive for some paranoid and obsessive thinking.  Judgment and insight limited by his psychiatric disorder.   CV-regular rate and rhythm   RESP-lungs clear to auscultation   EXTREM-mild bilateral ankle edema           3/12/2025     3:35 PM   Additional Questions   Roomed by Hilda PARKER   Accompanied by Home [facility]- (Cousin) Pat             Signed Electronically by: Telly Arias MD

## 2025-03-15 DIAGNOSIS — F41.9 ANXIETY: ICD-10-CM

## 2025-03-15 NOTE — TELEPHONE ENCOUNTER
Medication Question or Refill        What medication are you calling about (include dose and sig)?:     hydrOXYzine (VISTARIL) 50 MG capsule       Preferred Pharmacy:   [id number] - [place], [place] - [address]  [address]  [place] [address]  Phone: [phone] Fax: [phone]      Controlled Substance Agreement on file:   CSA -- Patient Level:    CSA: None found at the patient level.       Who prescribed the medication?: Telly Arias    Do you need a refill? Yes    When did you use the medication last?     Patient offered an appointment? No    Do you have any questions or concerns?  No, but [facility] is saying they dont have the prescription.       Okay to leave a detailed message?: Yes at Cell number on file:    Telephone Information:   Mobile [phone]

## 2025-03-17 RX ORDER — HYDROXYZINE PAMOATE 50 MG/1
100 CAPSULE ORAL 2 TIMES DAILY
Qty: 120 CAPSULE | Refills: 11 | Status: SHIPPED | OUTPATIENT
Start: 2025-03-17

## 2025-05-12 ENCOUNTER — MEDICAL CORRESPONDENCE (OUTPATIENT)
Dept: HEALTH INFORMATION MANAGEMENT | Facility: CLINIC | Age: 55
End: 2025-05-12
Payer: COMMERCIAL

## 2025-05-27 ENCOUNTER — OFFICE VISIT (OUTPATIENT)
Dept: FAMILY MEDICINE | Facility: CLINIC | Age: 55
End: 2025-05-27
Payer: COMMERCIAL

## 2025-05-27 VITALS
OXYGEN SATURATION: 95 % | RESPIRATION RATE: 16 BRPM | HEART RATE: 83 BPM | BODY MASS INDEX: 18.29 KG/M2 | DIASTOLIC BLOOD PRESSURE: 68 MMHG | HEIGHT: 72 IN | WEIGHT: 135.04 LBS | SYSTOLIC BLOOD PRESSURE: 100 MMHG | TEMPERATURE: 98.4 F

## 2025-05-27 DIAGNOSIS — F65.9 PSYCHOSEXUAL DISORDER: ICD-10-CM

## 2025-05-27 DIAGNOSIS — G81.94 LEFT HEMIPLEGIA (H): ICD-10-CM

## 2025-05-27 DIAGNOSIS — F31.12 BIPOLAR I DISORDER, MOST RECENT EPISODE MANIC, MODERATE (H): ICD-10-CM

## 2025-05-27 DIAGNOSIS — F41.1 GENERALIZED ANXIETY DISORDER: ICD-10-CM

## 2025-05-27 DIAGNOSIS — Z12.11 SCREEN FOR COLON CANCER: ICD-10-CM

## 2025-05-27 DIAGNOSIS — Z23 NEED FOR VACCINATION: ICD-10-CM

## 2025-05-27 DIAGNOSIS — F25.0 SCHIZOAFFECTIVE DISORDER, BIPOLAR TYPE (H): Primary | ICD-10-CM

## 2025-05-27 DIAGNOSIS — F31.12 BIPOLAR I DISORDER, MOST RECENT EPISODE (OR CURRENT) MANIC, MODERATE (H): Primary | ICD-10-CM

## 2025-05-27 LAB
AMYLASE SERPL-CCNC: 67 U/L (ref 28–100)
BASOPHILS # BLD AUTO: 0 10E3/UL (ref 0–0.2)
BASOPHILS NFR BLD AUTO: 0 %
BILIRUBIN DIRECT (ROCHE PRO & PURE): 0.15 MG/DL (ref 0–0.45)
EOSINOPHIL # BLD AUTO: 0.1 10E3/UL (ref 0–0.7)
EOSINOPHIL NFR BLD AUTO: 2 %
ERYTHROCYTE [DISTWIDTH] IN BLOOD BY AUTOMATED COUNT: 15.6 % (ref 10–15)
HCT VFR BLD AUTO: 40 % (ref 40–53)
HGB BLD-MCNC: 13.6 G/DL (ref 13.3–17.7)
HOLD SPECIMEN: NORMAL
IMM GRANULOCYTES # BLD: 0 10E3/UL
IMM GRANULOCYTES NFR BLD: 0 %
LYMPHOCYTES # BLD AUTO: 2 10E3/UL (ref 0.8–5.3)
LYMPHOCYTES NFR BLD AUTO: 33 %
MCH RBC QN AUTO: 33.9 PG (ref 26.5–33)
MCHC RBC AUTO-ENTMCNC: 34 G/DL (ref 31.5–36.5)
MCV RBC AUTO: 100 FL (ref 78–100)
MONOCYTES # BLD AUTO: 0.5 10E3/UL (ref 0–1.3)
MONOCYTES NFR BLD AUTO: 9 %
NEUTROPHILS # BLD AUTO: 3.2 10E3/UL (ref 1.6–8.3)
NEUTROPHILS NFR BLD AUTO: 55 %
PLATELET # BLD AUTO: 166 10E3/UL (ref 150–450)
RBC # BLD AUTO: 4.01 10E6/UL (ref 4.4–5.9)
VALPROATE SERPL-MCNC: 56.8 UG/ML (ref 50–100)
WBC # BLD AUTO: 5.9 10E3/UL (ref 4–11)

## 2025-05-27 PROCEDURE — 3074F SYST BP LT 130 MM HG: CPT | Performed by: FAMILY MEDICINE

## 2025-05-27 PROCEDURE — 82150 ASSAY OF AMYLASE: CPT | Performed by: FAMILY MEDICINE

## 2025-05-27 PROCEDURE — 3078F DIAST BP <80 MM HG: CPT | Performed by: FAMILY MEDICINE

## 2025-05-27 PROCEDURE — 85025 COMPLETE CBC W/AUTO DIFF WBC: CPT | Performed by: FAMILY MEDICINE

## 2025-05-27 PROCEDURE — G2211 COMPLEX E/M VISIT ADD ON: HCPCS | Performed by: FAMILY MEDICINE

## 2025-05-27 PROCEDURE — 90677 PCV20 VACCINE IM: CPT | Performed by: FAMILY MEDICINE

## 2025-05-27 PROCEDURE — 80164 ASSAY DIPROPYLACETIC ACD TOT: CPT | Performed by: FAMILY MEDICINE

## 2025-05-27 PROCEDURE — 80076 HEPATIC FUNCTION PANEL: CPT | Performed by: FAMILY MEDICINE

## 2025-05-27 PROCEDURE — G0009 ADMIN PNEUMOCOCCAL VACCINE: HCPCS | Performed by: FAMILY MEDICINE

## 2025-05-27 PROCEDURE — 36415 COLL VENOUS BLD VENIPUNCTURE: CPT | Performed by: FAMILY MEDICINE

## 2025-05-27 PROCEDURE — 99214 OFFICE O/P EST MOD 30 MIN: CPT | Performed by: FAMILY MEDICINE

## 2025-05-27 NOTE — PROGRESS NOTES
ASSESMENT AND PLAN:  Diagnoses and all orders for this visit:  Schizoaffective disorder, bipolar type (H), Generalized anxiety disorder  Improving compared to last visit.  Good response to hydroxyzine with reduced anxiety and better sleep.  He is seeing his psychiatrist regularly, labs ordered by outside psychiatrist:  -     Valproic acid  -     Amylase  -     CBC with Platelets & Differential  -     Bilirubin direct  -     Extra Tube  Left hemiplegia (H)  Disability parking pass forms completed today with the patient.  Need for vaccination  Immunization review and counseling done with the patient.  -     Pneumococcal 20 Valent Conjugate (PCV20)  Screen for colon cancer  Patient counseled on the risks and benefits of the various options in detail, he very much does not want to stop his blood thinner to have a colonoscopy.  He was encouraged to submit his Cologuard sample, he does have the kit at home.    Reviewed the risks and benefits of the treatment plan with the patient and/or caregiver and we discussed indications for routine and emergent follow-up.  The longitudinal plan of care for the diagnosis(es)/condition(s) as documented were addressed during this visit. Due to the added complexity in care, I will continue to support Tripp in the subsequent management and with ongoing continuity of care.        SUBJECTIVE: Patient in for follow-up.  Since last visit his perseveration has decreased, anxiety has decreased, and sleep has been better.  He feels like the combination of the higher dose of hydroxyzine and the lower dose of quetiapine overall has been quite helpful to him and his caregiver that is with him today agrees.  He is no longer having perseveration around sexual functioning.  Patient suffered a stroke previously and has resulting left-sided hemiplegia, he uses a support belt and a caregiver provides that support when needed with him.  Patient has to stop frequently to rest when ambulating longer  distances.  Requesting a disability parking pass.  Paperwork had been done for the patient previously but it was lost.    No past medical history on file.  Patient Active Problem List   Diagnosis    Bipolar affective disorder (H)    Primary insomnia    Alcohol abuse    Anxiety    Schizoaffective disorder, bipolar type (H)    Sinus tachycardia    Hypertriglyceridemia    Erectile dysfunction, unspecified erectile dysfunction type    Acute right MCA stroke (H)    Left hemiplegia (H)    Marijuana abuse    Alcohol dependence, uncomplicated (H)    Aspiration pneumonia, unspecified aspiration pneumonia type, unspecified laterality, unspecified part of lung (H)    Dysphagia, unspecified type    Prostate hypertrophy    Pulmonary embolism, other, unspecified chronicity, unspecified whether acute cor pulmonale present (H)    Chronic systolic congestive heart failure (H)    Generalized anxiety disorder     Current Outpatient Medications   Medication Sig Dispense Refill    atorvastatin (LIPITOR) 80 MG tablet Take 1 tablet (80 mg) by mouth every evening. 90 tablet 3    hydrOXYzine (VISTARIL) 50 MG capsule Take 2 capsules (100 mg) by mouth 2 times daily. 120 capsule 11    QUEtiapine (SEROQUEL) 100 MG tablet Take 4 tablets (400 mg) by mouth at bedtime.      acetaminophen (TYLENOL) 325 MG tablet Place 325-650 mg into G tube every 6 hours as needed for pain.      apixaban ANTICOAGULANT (ELIQUIS) 5 MG tablet Take 1 tablet (5 mg) by mouth 2 times daily. 180 tablet 3    lidocaine (XYLOCAINE) 5 % external ointment Apply topically 4 times daily as needed (skin pain at g tube site).      omeprazole (PRILOSEC) 20 MG DR capsule Take 1 capsule (20 mg) by mouth daily. Per g tube 90 capsule 3    senna-docusate (SENOKOT-S/PERICOLACE) 8.6-50 MG tablet 1 tablet by Enteral route 2 times daily as needed for constipation.      Valproate Sodium (VALPROIC ACID) 250 MG/5ML Place 250 mg into G tube 2 times daily.      vitamin B1 (THIAMINE) 100 MG tablet  Take 1 tablet (100 mg) by mouth daily. 90 tablet 3     History   Smoking Status    Former    Types: Cigarettes, Other   Smokeless Tobacco    Never       OBJECTICE: /68 (BP Location: Left arm, Patient Position: Sitting, Cuff Size: Adult Regular)   Pulse 83   Temp 98.4  F (36.9  C) (Oral)   Resp 16   Ht 1.829 m (6')   Wt 61.3 kg (135 lb 0.6 oz)   SpO2 95%   BMI 18.31 kg/m       No results found for this or any previous visit (from the past 24 hours).     Psychiatric-speech is less pressured, less perseveration in his thought content, thought content negative for suicidal ideation   CV-regular rate and rhythm   RESP-lungs clear         Signed Electronically by: Telly Arias MD

## 2025-05-28 DIAGNOSIS — F31.12 BIPOLAR I DISORDER, MOST RECENT EPISODE MANIC, MODERATE (H): Primary | ICD-10-CM

## 2025-05-28 DIAGNOSIS — F41.1 GENERALIZED ANXIETY DISORDER: ICD-10-CM

## 2025-05-28 DIAGNOSIS — F65.9 PSYCHOSEXUAL DISORDER: ICD-10-CM

## 2025-05-28 LAB
ALBUMIN SERPL BCG-MCNC: 3.8 G/DL (ref 3.5–5.2)
ALP SERPL-CCNC: 67 U/L (ref 40–150)
ALT SERPL W P-5'-P-CCNC: 27 U/L (ref 0–70)
AST SERPL W P-5'-P-CCNC: 40 U/L (ref 0–45)
BILIRUB SERPL-MCNC: 0.3 MG/DL
BILIRUBIN DIRECT (ROCHE PRO & PURE): 0.15 MG/DL (ref 0–0.45)
PROT SERPL-MCNC: 7 G/DL (ref 6.4–8.3)

## 2025-08-06 ENCOUNTER — TELEPHONE (OUTPATIENT)
Dept: FAMILY MEDICINE | Facility: CLINIC | Age: 55
End: 2025-08-06

## 2025-08-07 ENCOUNTER — NURSE TRIAGE (OUTPATIENT)
Dept: FAMILY MEDICINE | Facility: CLINIC | Age: 55
End: 2025-08-07
Payer: COMMERCIAL

## 2025-08-18 ENCOUNTER — OFFICE VISIT (OUTPATIENT)
Dept: FAMILY MEDICINE | Facility: CLINIC | Age: 55
End: 2025-08-18
Payer: COMMERCIAL

## 2025-08-18 ENCOUNTER — ANCILLARY PROCEDURE (OUTPATIENT)
Dept: GENERAL RADIOLOGY | Facility: CLINIC | Age: 55
End: 2025-08-18
Attending: FAMILY MEDICINE
Payer: COMMERCIAL

## 2025-08-18 VITALS
OXYGEN SATURATION: 97 % | HEIGHT: 72 IN | BODY MASS INDEX: 18.83 KG/M2 | WEIGHT: 139 LBS | TEMPERATURE: 98 F | SYSTOLIC BLOOD PRESSURE: 101 MMHG | HEART RATE: 91 BPM | RESPIRATION RATE: 16 BRPM | DIASTOLIC BLOOD PRESSURE: 67 MMHG

## 2025-08-18 DIAGNOSIS — M79.89 LEFT LEG SWELLING: ICD-10-CM

## 2025-08-18 DIAGNOSIS — J18.9 PNEUMONIA OF LEFT LOWER LOBE DUE TO INFECTIOUS ORGANISM: ICD-10-CM

## 2025-08-18 DIAGNOSIS — I99.8 ISCHEMIC PAIN OF FOOT, LEFT: Primary | ICD-10-CM

## 2025-08-18 DIAGNOSIS — R05.3 CHRONIC COUGH: ICD-10-CM

## 2025-08-18 DIAGNOSIS — M79.672 ISCHEMIC PAIN OF FOOT, LEFT: Primary | ICD-10-CM

## 2025-08-18 PROCEDURE — 86481 TB AG RESPONSE T-CELL SUSP: CPT | Performed by: FAMILY MEDICINE

## 2025-08-18 PROCEDURE — 71046 X-RAY EXAM CHEST 2 VIEWS: CPT | Mod: TC | Performed by: RADIOLOGY

## 2025-08-18 PROCEDURE — 85379 FIBRIN DEGRADATION QUANT: CPT | Performed by: FAMILY MEDICINE

## 2025-08-18 PROCEDURE — 99214 OFFICE O/P EST MOD 30 MIN: CPT | Performed by: FAMILY MEDICINE

## 2025-08-18 PROCEDURE — 3074F SYST BP LT 130 MM HG: CPT | Performed by: FAMILY MEDICINE

## 2025-08-18 PROCEDURE — 36415 COLL VENOUS BLD VENIPUNCTURE: CPT | Performed by: FAMILY MEDICINE

## 2025-08-18 PROCEDURE — 3078F DIAST BP <80 MM HG: CPT | Performed by: FAMILY MEDICINE

## 2025-08-19 ENCOUNTER — RESULTS FOLLOW-UP (OUTPATIENT)
Dept: FAMILY MEDICINE | Facility: CLINIC | Age: 55
End: 2025-08-19
Payer: COMMERCIAL

## 2025-08-19 ENCOUNTER — TELEPHONE (OUTPATIENT)
Dept: VASCULAR SURGERY | Facility: CLINIC | Age: 55
End: 2025-08-19
Payer: COMMERCIAL

## 2025-08-19 DIAGNOSIS — T14.8XXD DELAYED HEALING OF TRAUMATIC WOUND: ICD-10-CM

## 2025-08-19 DIAGNOSIS — I99.8 ISCHEMIC PAIN OF FOOT, LEFT: Primary | ICD-10-CM

## 2025-08-19 DIAGNOSIS — M79.672 LEFT FOOT PAIN: ICD-10-CM

## 2025-08-19 DIAGNOSIS — R23.1 COOL SKIN: ICD-10-CM

## 2025-08-19 DIAGNOSIS — M79.672 ISCHEMIC PAIN OF FOOT, LEFT: Primary | ICD-10-CM

## 2025-08-19 DIAGNOSIS — R09.89 OTHER SPECIFIED SYMPTOMS AND SIGNS INVOLVING THE CIRCULATORY AND RESPIRATORY SYSTEMS: ICD-10-CM

## 2025-08-19 LAB — D DIMER PPP FEU-MCNC: <0.27 UG/ML FEU (ref 0–0.5)

## 2025-08-19 RX ORDER — AZITHROMYCIN 250 MG/1
TABLET, FILM COATED ORAL
Qty: 6 TABLET | Refills: 0 | Status: SHIPPED | OUTPATIENT
Start: 2025-08-19 | End: 2025-08-24

## 2025-08-20 LAB
GAMMA INTERFERON BACKGROUND BLD IA-ACNC: 0.25 IU/ML
M TB IFN-G BLD-IMP: NEGATIVE
M TB IFN-G CD4+ BCKGRND COR BLD-ACNC: 9.75 IU/ML
MITOGEN IGNF BCKGRD COR BLD-ACNC: -0.12 IU/ML
MITOGEN IGNF BCKGRD COR BLD-ACNC: -0.14 IU/ML
QUANTIFERON MITOGEN: 10 IU/ML
QUANTIFERON NIL TUBE: 0.25 IU/ML
QUANTIFERON TB1 TUBE: 0.13 IU/ML
QUANTIFERON TB2 TUBE: 0.11